# Patient Record
Sex: FEMALE | Race: WHITE | NOT HISPANIC OR LATINO | Employment: UNEMPLOYED | URBAN - METROPOLITAN AREA
[De-identification: names, ages, dates, MRNs, and addresses within clinical notes are randomized per-mention and may not be internally consistent; named-entity substitution may affect disease eponyms.]

---

## 2017-01-25 ENCOUNTER — HISTORICAL (OUTPATIENT)
Dept: RADIOLOGY | Facility: HOSPITAL | Age: 67
End: 2017-01-25

## 2017-05-09 ENCOUNTER — HISTORICAL (OUTPATIENT)
Dept: OCCUPATIONAL THERAPY | Facility: HOSPITAL | Age: 67
End: 2017-05-09

## 2017-05-10 ENCOUNTER — HISTORICAL (OUTPATIENT)
Dept: OCCUPATIONAL THERAPY | Facility: HOSPITAL | Age: 67
End: 2017-05-10

## 2017-05-12 ENCOUNTER — HISTORICAL (OUTPATIENT)
Dept: OCCUPATIONAL THERAPY | Facility: HOSPITAL | Age: 67
End: 2017-05-12

## 2017-05-18 ENCOUNTER — HISTORICAL (OUTPATIENT)
Dept: OCCUPATIONAL THERAPY | Facility: HOSPITAL | Age: 67
End: 2017-05-18

## 2017-05-22 ENCOUNTER — HISTORICAL (OUTPATIENT)
Dept: OCCUPATIONAL THERAPY | Facility: HOSPITAL | Age: 67
End: 2017-05-22

## 2017-05-24 ENCOUNTER — HISTORICAL (OUTPATIENT)
Dept: OCCUPATIONAL THERAPY | Facility: HOSPITAL | Age: 67
End: 2017-05-24

## 2017-05-31 ENCOUNTER — HISTORICAL (OUTPATIENT)
Dept: ADMINISTRATIVE | Facility: HOSPITAL | Age: 67
End: 2017-05-31

## 2017-06-06 ENCOUNTER — HISTORICAL (OUTPATIENT)
Dept: PREADMISSION TESTING | Facility: HOSPITAL | Age: 67
End: 2017-06-06

## 2017-06-06 LAB
BUN SERPL-MCNC: 17 MG/DL (ref 7–18)
CALCIUM SERPL-MCNC: 10.1 MG/DL (ref 8.5–10.1)
CHLORIDE SERPL-SCNC: 104 MMOL/L (ref 98–107)
CO2 SERPL-SCNC: 28 MMOL/L (ref 21–32)
CREAT SERPL-MCNC: 0.94 MG/DL (ref 0.55–1.02)
GLUCOSE SERPL-MCNC: 85 MG/DL (ref 74–106)
POTASSIUM SERPL-SCNC: 4.2 MMOL/L (ref 3.5–5.1)
SODIUM SERPL-SCNC: 140 MMOL/L (ref 136–145)

## 2017-06-08 ENCOUNTER — HISTORICAL (OUTPATIENT)
Dept: SURGERY | Facility: HOSPITAL | Age: 67
End: 2017-06-08

## 2017-07-05 ENCOUNTER — HISTORICAL (OUTPATIENT)
Dept: OCCUPATIONAL THERAPY | Facility: HOSPITAL | Age: 67
End: 2017-07-05

## 2017-07-07 ENCOUNTER — HISTORICAL (OUTPATIENT)
Dept: OCCUPATIONAL THERAPY | Facility: HOSPITAL | Age: 67
End: 2017-07-07

## 2017-07-10 ENCOUNTER — HISTORICAL (OUTPATIENT)
Dept: OCCUPATIONAL THERAPY | Facility: HOSPITAL | Age: 67
End: 2017-07-10

## 2017-07-12 ENCOUNTER — HISTORICAL (OUTPATIENT)
Dept: OCCUPATIONAL THERAPY | Facility: HOSPITAL | Age: 67
End: 2017-07-12

## 2017-07-14 ENCOUNTER — HISTORICAL (OUTPATIENT)
Dept: OCCUPATIONAL THERAPY | Facility: HOSPITAL | Age: 67
End: 2017-07-14

## 2017-07-17 ENCOUNTER — HISTORICAL (OUTPATIENT)
Dept: OCCUPATIONAL THERAPY | Facility: HOSPITAL | Age: 67
End: 2017-07-17

## 2017-07-19 ENCOUNTER — HISTORICAL (OUTPATIENT)
Dept: OCCUPATIONAL THERAPY | Facility: HOSPITAL | Age: 67
End: 2017-07-19

## 2017-07-21 ENCOUNTER — HISTORICAL (OUTPATIENT)
Dept: OCCUPATIONAL THERAPY | Facility: HOSPITAL | Age: 67
End: 2017-07-21

## 2017-07-24 ENCOUNTER — HISTORICAL (OUTPATIENT)
Dept: OCCUPATIONAL THERAPY | Facility: HOSPITAL | Age: 67
End: 2017-07-24

## 2017-07-26 ENCOUNTER — HISTORICAL (OUTPATIENT)
Dept: OCCUPATIONAL THERAPY | Facility: HOSPITAL | Age: 67
End: 2017-07-26

## 2017-07-28 ENCOUNTER — HISTORICAL (OUTPATIENT)
Dept: OCCUPATIONAL THERAPY | Facility: HOSPITAL | Age: 67
End: 2017-07-28

## 2017-07-31 ENCOUNTER — HISTORICAL (OUTPATIENT)
Dept: OCCUPATIONAL THERAPY | Facility: HOSPITAL | Age: 67
End: 2017-07-31

## 2017-08-02 ENCOUNTER — HISTORICAL (OUTPATIENT)
Dept: OCCUPATIONAL THERAPY | Facility: HOSPITAL | Age: 67
End: 2017-08-02

## 2017-08-04 ENCOUNTER — HISTORICAL (OUTPATIENT)
Dept: OCCUPATIONAL THERAPY | Facility: HOSPITAL | Age: 67
End: 2017-08-04

## 2017-08-07 ENCOUNTER — HISTORICAL (OUTPATIENT)
Dept: OCCUPATIONAL THERAPY | Facility: HOSPITAL | Age: 67
End: 2017-08-07

## 2017-08-09 ENCOUNTER — HISTORICAL (OUTPATIENT)
Dept: OCCUPATIONAL THERAPY | Facility: HOSPITAL | Age: 67
End: 2017-08-09

## 2017-08-11 ENCOUNTER — HISTORICAL (OUTPATIENT)
Dept: OCCUPATIONAL THERAPY | Facility: HOSPITAL | Age: 67
End: 2017-08-11

## 2017-08-14 ENCOUNTER — HISTORICAL (OUTPATIENT)
Dept: RADIOLOGY | Facility: HOSPITAL | Age: 67
End: 2017-08-14

## 2017-08-14 LAB
ABS NEUT (OLG): 4.19
ALBUMIN SERPL-MCNC: 4.3 GM/DL (ref 3.4–5)
ALBUMIN/GLOB SERPL: 1.2 RATIO (ref 1.1–2)
ALP SERPL-CCNC: 46 UNIT/L (ref 50–136)
ALT SERPL-CCNC: 15 UNIT/L (ref 12–78)
AST SERPL-CCNC: 10 UNIT/L (ref 10–37)
BASOPHILS # BLD AUTO: 0.02 X10(3)/MCL
BASOPHILS NFR BLD AUTO: 0.2 %
BILIRUB SERPL-MCNC: 0.4 MG/DL (ref 0.2–1)
BILIRUBIN DIRECT+TOT PNL SERPL-MCNC: 0.12 MG/DL (ref 0.05–0.2)
BILIRUBIN DIRECT+TOT PNL SERPL-MCNC: 0.28 MG/DL
BUN SERPL-MCNC: 16 MG/DL (ref 7–18)
CALCIUM SERPL-MCNC: 9.8 MG/DL (ref 8.5–10.1)
CHLORIDE SERPL-SCNC: 105 MMOL/L (ref 98–107)
CO2 SERPL-SCNC: 26.3 MMOL/L (ref 21–32)
CREAT SERPL-MCNC: 0.95 MG/DL (ref 0.55–1.02)
EOSINOPHIL # BLD AUTO: 0.08 X10(3)/MCL
EOSINOPHIL NFR BLD AUTO: 0.9 %
ERYTHROCYTE [DISTWIDTH] IN BLOOD BY AUTOMATED COUNT: 12 %
GLOBULIN SER-MCNC: 3.6 GM/DL (ref 2.4–3.5)
GLUCOSE SERPL-MCNC: 89 MG/DL (ref 74–106)
HCT VFR BLD AUTO: 41.3 % (ref 34–46)
HGB BLD-MCNC: 14.1 GM/DL (ref 11.3–15.4)
IMM GRANULOCYTES # BLD AUTO: 0.01 10*3/UL (ref 0–0.1)
IMM GRANULOCYTES NFR BLD AUTO: 0.1 % (ref 0–1)
LYMPHOCYTES # BLD AUTO: 3.22 X10(3)/MCL
LYMPHOCYTES NFR BLD AUTO: 37.5 %
MCH RBC QN AUTO: 30.6 PG (ref 27–33)
MCHC RBC AUTO-ENTMCNC: 34.1 GM/DL (ref 32–35)
MCV RBC AUTO: 89.6 FL (ref 81–97)
MONOCYTES # BLD AUTO: 1.06 X10(3)/MCL
MONOCYTES NFR BLD AUTO: 12.4 %
NEUTROPHILS # BLD AUTO: 4.19 X10(3)/MCL
NEUTROPHILS NFR BLD AUTO: 48.9 %
PLATELET # BLD AUTO: 286 X10(3)/MCL (ref 151–368)
PMV BLD AUTO: 11 FL
POTASSIUM SERPL-SCNC: 4 MMOL/L (ref 3.5–5.1)
PROT SERPL-MCNC: 7.9 GM/DL (ref 6.4–8.2)
RBC # BLD AUTO: 4.61 X10(6)/MCL (ref 3.9–5)
SODIUM SERPL-SCNC: 141 MMOL/L (ref 136–145)
WBC # SPEC AUTO: 8.58 X10(3)/MCL (ref 3.4–9.2)

## 2017-08-16 ENCOUNTER — HISTORICAL (OUTPATIENT)
Dept: OCCUPATIONAL THERAPY | Facility: HOSPITAL | Age: 67
End: 2017-08-16

## 2017-08-18 ENCOUNTER — HISTORICAL (OUTPATIENT)
Dept: OCCUPATIONAL THERAPY | Facility: HOSPITAL | Age: 67
End: 2017-08-18

## 2017-08-21 ENCOUNTER — HISTORICAL (OUTPATIENT)
Dept: OCCUPATIONAL THERAPY | Facility: HOSPITAL | Age: 67
End: 2017-08-21

## 2017-08-23 ENCOUNTER — HISTORICAL (OUTPATIENT)
Dept: OCCUPATIONAL THERAPY | Facility: HOSPITAL | Age: 67
End: 2017-08-23

## 2017-08-30 ENCOUNTER — HISTORICAL (OUTPATIENT)
Dept: OCCUPATIONAL THERAPY | Facility: HOSPITAL | Age: 67
End: 2017-08-30

## 2017-09-01 ENCOUNTER — HISTORICAL (OUTPATIENT)
Dept: OCCUPATIONAL THERAPY | Facility: HOSPITAL | Age: 67
End: 2017-09-01

## 2017-09-06 ENCOUNTER — HISTORICAL (OUTPATIENT)
Dept: OCCUPATIONAL THERAPY | Facility: HOSPITAL | Age: 67
End: 2017-09-06

## 2017-09-08 ENCOUNTER — HISTORICAL (OUTPATIENT)
Dept: OCCUPATIONAL THERAPY | Facility: HOSPITAL | Age: 67
End: 2017-09-08

## 2017-10-31 ENCOUNTER — HISTORICAL (OUTPATIENT)
Dept: LAB | Facility: HOSPITAL | Age: 67
End: 2017-10-31

## 2017-10-31 LAB
ALBUMIN SERPL-MCNC: 3.7 GM/DL (ref 3.4–5)
ALBUMIN/GLOB SERPL: 1.1 RATIO (ref 1.1–2)
ALP SERPL-CCNC: 44 UNIT/L (ref 50–136)
ALT SERPL-CCNC: 31 UNIT/L (ref 12–78)
AST SERPL-CCNC: 19 UNIT/L (ref 10–37)
BILIRUB SERPL-MCNC: 0.4 MG/DL (ref 0.2–1)
BILIRUBIN DIRECT+TOT PNL SERPL-MCNC: 0.11 MG/DL (ref 0.05–0.2)
BILIRUBIN DIRECT+TOT PNL SERPL-MCNC: 0.31 MG/DL
BUN SERPL-MCNC: 22 MG/DL (ref 7–18)
CALCIUM SERPL-MCNC: 9 MG/DL (ref 8.5–10.1)
CHLORIDE SERPL-SCNC: 107 MMOL/L (ref 98–107)
CHOLEST SERPL-MCNC: 155 MG/DL (ref 50–200)
CHOLEST/HDLC SERPL: 2 {RATIO} (ref 0–5)
CO2 SERPL-SCNC: 25.2 MMOL/L (ref 21–32)
CREAT SERPL-MCNC: 0.89 MG/DL (ref 0.55–1.02)
GLOBULIN SER-MCNC: 3.5 GM/DL (ref 2.4–3.5)
GLUCOSE SERPL-MCNC: 105 MG/DL (ref 74–106)
HDLC SERPL-MCNC: 67 MG/DL (ref 35–60)
LDLC SERPL CALC-MCNC: 75.6 MG/DL (ref 50–140)
POTASSIUM SERPL-SCNC: 4 MMOL/L (ref 3.5–5.1)
PROT SERPL-MCNC: 7.2 GM/DL (ref 6.4–8.2)
SODIUM SERPL-SCNC: 142 MMOL/L (ref 136–145)
TRIGL SERPL-MCNC: 62 MG/DL (ref 30–150)
TSH SERPL-ACNC: 2.77 MIU/ML (ref 0.35–3.75)
VLDLC SERPL CALC-MCNC: 12 MG/DL

## 2017-12-01 ENCOUNTER — HISTORICAL (OUTPATIENT)
Dept: RADIOLOGY | Facility: HOSPITAL | Age: 67
End: 2017-12-01

## 2017-12-01 LAB
ABS NEUT (OLG): 4.43
ALBUMIN SERPL-MCNC: 4.1 GM/DL (ref 3.4–5)
ALBUMIN/GLOB SERPL: 1 RATIO (ref 1.1–2)
ALP SERPL-CCNC: 47 UNIT/L (ref 46–116)
ALT SERPL-CCNC: 24 UNIT/L (ref 12–78)
AMYLASE SERPL-CCNC: 40 UNIT/L (ref 25–115)
AST SERPL-CCNC: 17 UNIT/L (ref 10–37)
BASOPHILS # BLD AUTO: 0.02 X10(3)/MCL
BASOPHILS NFR BLD AUTO: 0.2 %
BILIRUB SERPL-MCNC: 0.4 MG/DL (ref 0.2–1)
BILIRUBIN DIRECT+TOT PNL SERPL-MCNC: 0.14 MG/DL (ref 0–0.2)
BILIRUBIN DIRECT+TOT PNL SERPL-MCNC: 0.31 MG/DL
BUN SERPL-MCNC: 22 MG/DL (ref 7–18)
CALCIUM SERPL-MCNC: 9.5 MG/DL (ref 8.5–10.1)
CHLORIDE SERPL-SCNC: 103 MMOL/L (ref 98–107)
CO2 SERPL-SCNC: 24.8 MMOL/L (ref 21–32)
CREAT SERPL-MCNC: 0.98 MG/DL (ref 0.55–1.02)
EOSINOPHIL # BLD AUTO: 0.12 X10(3)/MCL
EOSINOPHIL NFR BLD AUTO: 1.5 %
ERYTHROCYTE [DISTWIDTH] IN BLOOD BY AUTOMATED COUNT: 13 %
GLOBULIN SER-MCNC: 4 GM/DL (ref 2.4–3.5)
GLUCOSE SERPL-MCNC: 97 MG/DL (ref 74–106)
HCT VFR BLD AUTO: 42.3 % (ref 34–46)
HGB BLD-MCNC: 14.2 GM/DL (ref 11.3–15.4)
IMM GRANULOCYTES # BLD AUTO: 0.02 10*3/UL (ref 0–0.1)
IMM GRANULOCYTES NFR BLD AUTO: 0.2 % (ref 0–1)
LYMPHOCYTES # BLD AUTO: 2.49 X10(3)/MCL
LYMPHOCYTES NFR BLD AUTO: 31 %
MCH RBC QN AUTO: 30.5 PG (ref 27–33)
MCHC RBC AUTO-ENTMCNC: 33.6 GM/DL (ref 32–35)
MCV RBC AUTO: 90.8 FL (ref 81–97)
MONOCYTES # BLD AUTO: 0.95 X10(3)/MCL
MONOCYTES NFR BLD AUTO: 11.8 %
NEUTROPHILS # BLD AUTO: 4.43 X10(3)/MCL
NEUTROPHILS NFR BLD AUTO: 55.3 %
PLATELET # BLD AUTO: 241 X10(3)/MCL (ref 151–368)
PMV BLD AUTO: 12 FL
POTASSIUM SERPL-SCNC: 4 MMOL/L (ref 3.5–5.1)
PROT SERPL-MCNC: 8.1 GM/DL (ref 6.4–8.2)
RBC # BLD AUTO: 4.66 X10(6)/MCL (ref 3.9–5)
SODIUM SERPL-SCNC: 140 MMOL/L (ref 136–145)
WBC # SPEC AUTO: 8.03 X10(3)/MCL (ref 3.4–9.2)

## 2018-02-07 ENCOUNTER — HISTORICAL (OUTPATIENT)
Dept: RADIOLOGY | Facility: HOSPITAL | Age: 68
End: 2018-02-07

## 2019-02-13 ENCOUNTER — HISTORICAL (OUTPATIENT)
Dept: RADIOLOGY | Facility: HOSPITAL | Age: 69
End: 2019-02-13

## 2019-03-08 ENCOUNTER — HISTORICAL (OUTPATIENT)
Dept: LAB | Facility: HOSPITAL | Age: 69
End: 2019-03-08

## 2019-03-08 LAB
ALBUMIN SERPL-MCNC: 3.9 GM/DL (ref 3.4–5)
ALBUMIN/GLOB SERPL: 1.1 RATIO (ref 1.1–2)
ALP SERPL-CCNC: 70 UNIT/L (ref 46–116)
ALT SERPL-CCNC: 33 UNIT/L (ref 12–78)
AST SERPL-CCNC: 17 UNIT/L (ref 10–37)
BILIRUB SERPL-MCNC: 0.6 MG/DL (ref 0.2–1)
BILIRUBIN DIRECT+TOT PNL SERPL-MCNC: 0.11 MG/DL (ref 0–0.2)
BILIRUBIN DIRECT+TOT PNL SERPL-MCNC: 0.49 MG/DL
BUN SERPL-MCNC: 23 MG/DL (ref 7–18)
CALCIUM SERPL-MCNC: 9.2 MG/DL (ref 8.5–10.1)
CHLORIDE SERPL-SCNC: 104 MMOL/L (ref 98–107)
CHOLEST SERPL-MCNC: 278 MG/DL (ref 50–200)
CHOLEST/HDLC SERPL: 6 {RATIO} (ref 0–5)
CO2 SERPL-SCNC: 26 MMOL/L (ref 21–32)
CREAT SERPL-MCNC: 0.83 MG/DL (ref 0.55–1.02)
CRP SERPL-MCNC: 0.3 MG/DL
ERYTHROCYTE [SEDIMENTATION RATE] IN BLOOD: 6 MM/HR (ref 0–20)
GLOBULIN SER-MCNC: 3.7 GM/DL (ref 2.4–3.5)
GLUCOSE SERPL-MCNC: 98 MG/DL (ref 74–106)
HDLC SERPL-MCNC: 47 MG/DL (ref 35–60)
LDLC SERPL CALC-MCNC: 196 MG/DL (ref 50–140)
POTASSIUM SERPL-SCNC: 4.2 MMOL/L (ref 3.5–5.1)
PROT SERPL-MCNC: 7.6 GM/DL (ref 6.4–8.2)
RHEUMATOID FACT SERPL-ACNC: <10 IU/ML (ref 0–15)
SODIUM SERPL-SCNC: 141 MMOL/L (ref 136–145)
TRIGL SERPL-MCNC: 173 MG/DL (ref 30–150)
TSH SERPL-ACNC: 2.23 MIU/ML (ref 0.35–3.75)
URATE SERPL-MCNC: 6.7 MG/DL (ref 2.6–7.2)
VLDLC SERPL CALC-MCNC: 35 MG/DL

## 2019-10-28 ENCOUNTER — HISTORICAL (OUTPATIENT)
Dept: LAB | Facility: HOSPITAL | Age: 69
End: 2019-10-28

## 2019-10-28 LAB
ABS NEUT (OLG): 6.37
ALBUMIN SERPL-MCNC: 3.9 GM/DL (ref 3.4–5)
ALBUMIN/GLOB SERPL: 1 RATIO (ref 1.1–2)
ALP SERPL-CCNC: 78 UNIT/L (ref 46–116)
ALT SERPL-CCNC: 25 UNIT/L (ref 12–78)
AST SERPL-CCNC: 19 UNIT/L (ref 10–37)
BASOPHILS # BLD AUTO: 0.03 X10(3)/MCL
BASOPHILS NFR BLD AUTO: 0.3 %
BILIRUB SERPL-MCNC: 0.5 MG/DL (ref 0.2–1)
BILIRUBIN DIRECT+TOT PNL SERPL-MCNC: 0.11 MG/DL (ref 0–0.2)
BILIRUBIN DIRECT+TOT PNL SERPL-MCNC: 0.39 MG/DL
BUN SERPL-MCNC: 20 MG/DL (ref 7–18)
CALCIUM SERPL-MCNC: 10.1 MG/DL (ref 8.5–10.1)
CHLORIDE SERPL-SCNC: 102 MMOL/L (ref 98–107)
CO2 SERPL-SCNC: 29 MMOL/L (ref 21–32)
CREAT SERPL-MCNC: 0.92 MG/DL (ref 0.55–1.02)
EOSINOPHIL # BLD AUTO: 0.15 X10(3)/MCL
EOSINOPHIL NFR BLD AUTO: 1.3 %
ERYTHROCYTE [DISTWIDTH] IN BLOOD BY AUTOMATED COUNT: 12 %
GLOBULIN SER-MCNC: 3.8 GM/DL (ref 2.4–3.5)
GLUCOSE SERPL-MCNC: 85 MG/DL (ref 74–106)
HCT VFR BLD AUTO: 44.2 % (ref 34–46)
HGB BLD-MCNC: 14.8 GM/DL (ref 11.3–15.4)
IMM GRANULOCYTES # BLD AUTO: 0.02 10*3/UL (ref 0–0.1)
IMM GRANULOCYTES NFR BLD AUTO: 0.2 % (ref 0–1)
LYMPHOCYTES # BLD AUTO: 3.59 X10(3)/MCL
LYMPHOCYTES NFR BLD AUTO: 31.8 %
MAGNESIUM SERPL-MCNC: 1.9 MG/DL (ref 1.8–2.4)
MCH RBC QN AUTO: 31.4 PG (ref 27–33)
MCHC RBC AUTO-ENTMCNC: 33.5 GM/DL (ref 32–35)
MCV RBC AUTO: 93.6 FL (ref 81–97)
MONOCYTES # BLD AUTO: 1.12 X10(3)/MCL
MONOCYTES NFR BLD AUTO: 9.9 %
NEUTROPHILS # BLD AUTO: 6.37 X10(3)/MCL
NEUTROPHILS NFR BLD AUTO: 56.5 %
PLATELET # BLD AUTO: 289 X10(3)/MCL (ref 140–450)
PMV BLD AUTO: 10 FL
POTASSIUM SERPL-SCNC: 4.4 MMOL/L (ref 3.5–5.1)
PROT SERPL-MCNC: 7.7 GM/DL (ref 6.4–8.2)
RBC # BLD AUTO: 4.72 X10(6)/MCL (ref 3.9–5)
SODIUM SERPL-SCNC: 139 MMOL/L (ref 136–145)
TSH SERPL-ACNC: 3.98 MIU/ML (ref 0.35–3.75)
WBC # SPEC AUTO: 11.28 X10(3)/MCL (ref 3.4–9.2)

## 2019-10-29 ENCOUNTER — HISTORICAL (OUTPATIENT)
Dept: RADIOLOGY | Facility: HOSPITAL | Age: 69
End: 2019-10-29

## 2019-10-29 LAB
ALBUMIN SERPL-MCNC: 3.9 GM/DL (ref 3.4–5)
ALP SERPL-CCNC: 77 UNIT/L (ref 46–116)
ALT SERPL-CCNC: 23 UNIT/L (ref 12–78)
AST SERPL-CCNC: 19 UNIT/L (ref 10–37)
BILIRUB SERPL-MCNC: 0.7 MG/DL (ref 0.2–1)
BILIRUBIN DIRECT+TOT PNL SERPL-MCNC: 0.17 MG/DL (ref 0–0.2)
BILIRUBIN DIRECT+TOT PNL SERPL-MCNC: 0.53 MG/DL
CHOLEST SERPL-MCNC: 195 MG/DL (ref 50–200)
CHOLEST/HDLC SERPL: 3 {RATIO} (ref 0–5)
HDLC SERPL-MCNC: 64 MG/DL (ref 35–60)
LDLC SERPL CALC-MCNC: 103 MG/DL (ref 50–140)
PROT SERPL-MCNC: 7.9 GM/DL (ref 6.4–8.2)
TRIGL SERPL-MCNC: 142 MG/DL (ref 30–150)
VLDLC SERPL CALC-MCNC: 28 MG/DL

## 2019-11-14 ENCOUNTER — HISTORICAL (OUTPATIENT)
Dept: OCCUPATIONAL THERAPY | Facility: HOSPITAL | Age: 69
End: 2019-11-14

## 2019-11-19 ENCOUNTER — HISTORICAL (OUTPATIENT)
Dept: OCCUPATIONAL THERAPY | Facility: HOSPITAL | Age: 69
End: 2019-11-19

## 2019-11-21 ENCOUNTER — HISTORICAL (OUTPATIENT)
Dept: OCCUPATIONAL THERAPY | Facility: HOSPITAL | Age: 69
End: 2019-11-21

## 2019-11-25 ENCOUNTER — HISTORICAL (OUTPATIENT)
Dept: OCCUPATIONAL THERAPY | Facility: HOSPITAL | Age: 69
End: 2019-11-25

## 2019-11-27 ENCOUNTER — HISTORICAL (OUTPATIENT)
Dept: OCCUPATIONAL THERAPY | Facility: HOSPITAL | Age: 69
End: 2019-11-27

## 2019-11-29 ENCOUNTER — HISTORICAL (OUTPATIENT)
Dept: OCCUPATIONAL THERAPY | Facility: HOSPITAL | Age: 69
End: 2019-11-29

## 2019-12-02 ENCOUNTER — HISTORICAL (OUTPATIENT)
Dept: OCCUPATIONAL THERAPY | Facility: HOSPITAL | Age: 69
End: 2019-12-02

## 2019-12-04 ENCOUNTER — HISTORICAL (OUTPATIENT)
Dept: OCCUPATIONAL THERAPY | Facility: HOSPITAL | Age: 69
End: 2019-12-04

## 2019-12-06 ENCOUNTER — HISTORICAL (OUTPATIENT)
Dept: OCCUPATIONAL THERAPY | Facility: HOSPITAL | Age: 69
End: 2019-12-06

## 2019-12-10 ENCOUNTER — HISTORICAL (OUTPATIENT)
Dept: OCCUPATIONAL THERAPY | Facility: HOSPITAL | Age: 69
End: 2019-12-10

## 2019-12-13 ENCOUNTER — HISTORICAL (OUTPATIENT)
Dept: OCCUPATIONAL THERAPY | Facility: HOSPITAL | Age: 69
End: 2019-12-13

## 2019-12-17 ENCOUNTER — HISTORICAL (OUTPATIENT)
Dept: OCCUPATIONAL THERAPY | Facility: HOSPITAL | Age: 69
End: 2019-12-17

## 2019-12-19 ENCOUNTER — HISTORICAL (OUTPATIENT)
Dept: OCCUPATIONAL THERAPY | Facility: HOSPITAL | Age: 69
End: 2019-12-19

## 2019-12-23 ENCOUNTER — HISTORICAL (OUTPATIENT)
Dept: OCCUPATIONAL THERAPY | Facility: HOSPITAL | Age: 69
End: 2019-12-23

## 2019-12-27 ENCOUNTER — HISTORICAL (OUTPATIENT)
Dept: OCCUPATIONAL THERAPY | Facility: HOSPITAL | Age: 69
End: 2019-12-27

## 2019-12-30 ENCOUNTER — HISTORICAL (OUTPATIENT)
Dept: LAB | Facility: HOSPITAL | Age: 69
End: 2019-12-30

## 2019-12-30 ENCOUNTER — HISTORICAL (OUTPATIENT)
Dept: OCCUPATIONAL THERAPY | Facility: HOSPITAL | Age: 69
End: 2019-12-30

## 2019-12-30 LAB — TSH SERPL-ACNC: 1.17 UIU/ML (ref 0.35–4.94)

## 2020-01-02 ENCOUNTER — HISTORICAL (OUTPATIENT)
Dept: OCCUPATIONAL THERAPY | Facility: HOSPITAL | Age: 70
End: 2020-01-02

## 2020-01-02 ENCOUNTER — HISTORICAL (OUTPATIENT)
Dept: RESPIRATORY THERAPY | Facility: HOSPITAL | Age: 70
End: 2020-01-02

## 2020-01-06 ENCOUNTER — HISTORICAL (OUTPATIENT)
Dept: OCCUPATIONAL THERAPY | Facility: HOSPITAL | Age: 70
End: 2020-01-06

## 2020-01-08 ENCOUNTER — HISTORICAL (OUTPATIENT)
Dept: OCCUPATIONAL THERAPY | Facility: HOSPITAL | Age: 70
End: 2020-01-08

## 2020-01-14 ENCOUNTER — HISTORICAL (OUTPATIENT)
Dept: OCCUPATIONAL THERAPY | Facility: HOSPITAL | Age: 70
End: 2020-01-14

## 2020-01-17 ENCOUNTER — HISTORICAL (OUTPATIENT)
Dept: OCCUPATIONAL THERAPY | Facility: HOSPITAL | Age: 70
End: 2020-01-17

## 2020-02-19 ENCOUNTER — HISTORICAL (OUTPATIENT)
Dept: RADIOLOGY | Facility: HOSPITAL | Age: 70
End: 2020-02-19

## 2020-02-26 ENCOUNTER — HISTORICAL (OUTPATIENT)
Dept: RADIOLOGY | Facility: HOSPITAL | Age: 70
End: 2020-02-26

## 2020-07-23 ENCOUNTER — HISTORICAL (OUTPATIENT)
Dept: RADIOLOGY | Facility: HOSPITAL | Age: 70
End: 2020-07-23

## 2020-08-11 ENCOUNTER — HISTORICAL (OUTPATIENT)
Dept: RADIOLOGY | Facility: HOSPITAL | Age: 70
End: 2020-08-11

## 2020-09-03 ENCOUNTER — HISTORICAL (OUTPATIENT)
Dept: RADIOLOGY | Facility: HOSPITAL | Age: 70
End: 2020-09-03

## 2020-10-14 ENCOUNTER — HISTORICAL (OUTPATIENT)
Dept: LAB | Facility: HOSPITAL | Age: 70
End: 2020-10-14

## 2020-12-11 ENCOUNTER — HISTORICAL (OUTPATIENT)
Dept: RADIOLOGY | Facility: HOSPITAL | Age: 70
End: 2020-12-11

## 2021-01-19 ENCOUNTER — HISTORICAL (OUTPATIENT)
Dept: LAB | Facility: HOSPITAL | Age: 71
End: 2021-01-19

## 2021-03-24 ENCOUNTER — HISTORICAL (OUTPATIENT)
Dept: RADIOLOGY | Facility: HOSPITAL | Age: 71
End: 2021-03-24

## 2021-04-28 ENCOUNTER — HISTORICAL (OUTPATIENT)
Dept: LAB | Facility: HOSPITAL | Age: 71
End: 2021-04-28

## 2021-04-28 LAB
ABS NEUT (OLG): 3.06
ALBUMIN SERPL-MCNC: 4.3 GM/DL (ref 3.4–4.8)
ALBUMIN/GLOB SERPL: 1.3 RATIO (ref 1.1–2)
ALP SERPL-CCNC: 98 UNIT/L (ref 40–150)
ALT SERPL-CCNC: 31 UNIT/L (ref 0–55)
AST SERPL-CCNC: 26 UNIT/L (ref 5–34)
BASOPHILS # BLD AUTO: 0.02 X10(3)/MCL
BASOPHILS NFR BLD AUTO: 0.3 %
BILIRUB SERPL-MCNC: 0.7 MG/DL
BILIRUBIN DIRECT+TOT PNL SERPL-MCNC: 0.3 MG/DL (ref 0–0.5)
BILIRUBIN DIRECT+TOT PNL SERPL-MCNC: 0.4 MG/DL
BUN SERPL-MCNC: 15 MG/DL (ref 9.8–20.1)
CALCIUM SERPL-MCNC: 9.6 MG/DL (ref 8.4–10.2)
CHLORIDE SERPL-SCNC: 104 MMOL/L (ref 98–107)
CO2 SERPL-SCNC: 27 MEQ/L (ref 23–31)
CREAT SERPL-MCNC: 0.99 MG/DL (ref 0.55–1.02)
EOSINOPHIL # BLD AUTO: 0.11 X10(3)/MCL
EOSINOPHIL NFR BLD AUTO: 1.8 %
ERYTHROCYTE [DISTWIDTH] IN BLOOD BY AUTOMATED COUNT: 13 %
GLOBULIN SER-MCNC: 3.3 GM/DL (ref 2.4–3.5)
GLUCOSE SERPL-MCNC: 199 MG/DL (ref 82–115)
HCT VFR BLD AUTO: 44.4 % (ref 34–46)
HGB BLD-MCNC: 14.5 GM/DL (ref 11.3–15.4)
IMM GRANULOCYTES # BLD AUTO: 0.01 10*3/UL (ref 0–0.1)
IMM GRANULOCYTES NFR BLD AUTO: 0.2 % (ref 0–1)
INR PPP: 1
LYMPHOCYTES # BLD AUTO: 2.6 X10(3)/MCL
LYMPHOCYTES NFR BLD AUTO: 42.2 %
MCH RBC QN AUTO: 30.1 PG (ref 27–33)
MCHC RBC AUTO-ENTMCNC: 32.7 GM/DL (ref 32–35)
MCV RBC AUTO: 92.3 FL (ref 81–97)
MONOCYTES # BLD AUTO: 0.36 X10(3)/MCL
MONOCYTES NFR BLD AUTO: 5.8 %
NEUTROPHILS # BLD AUTO: 3.06 X10(3)/MCL
NEUTROPHILS NFR BLD AUTO: 49.7 %
PLATELET # BLD AUTO: 249 X10(3)/MCL (ref 140–450)
PMV BLD AUTO: 10 FL
POTASSIUM SERPL-SCNC: 4 MMOL/L (ref 3.5–5.1)
PROT SERPL-MCNC: 7.6 GM/DL (ref 5.8–7.6)
PROTHROMBIN TIME: 9.9 SECOND(S) (ref 9.1–10.9)
RBC # BLD AUTO: 4.81 X10(6)/MCL (ref 3.9–5)
SODIUM SERPL-SCNC: 142 MMOL/L (ref 136–145)
WBC # SPEC AUTO: 6.16 X10(3)/MCL (ref 3.4–9.2)

## 2021-05-19 ENCOUNTER — HISTORICAL (OUTPATIENT)
Dept: LAB | Facility: HOSPITAL | Age: 71
End: 2021-05-19

## 2021-06-04 ENCOUNTER — HISTORICAL (OUTPATIENT)
Dept: RADIOLOGY | Facility: HOSPITAL | Age: 71
End: 2021-06-04

## 2021-06-17 ENCOUNTER — HISTORICAL (OUTPATIENT)
Dept: LAB | Facility: HOSPITAL | Age: 71
End: 2021-06-17

## 2021-06-17 LAB
CHOLEST SERPL-MCNC: 177 MG/DL
CHOLEST/HDLC SERPL: 4 {RATIO} (ref 0–5)
EST. AVERAGE GLUCOSE BLD GHB EST-MCNC: 120 MG/DL
HBA1C MFR BLD: 5.8 % (ref 4–6)
HDLC SERPL-MCNC: 43 MG/DL (ref 35–60)
LDLC SERPL CALC-MCNC: 94 MG/DL (ref 50–140)
TRIGL SERPL-MCNC: 202 MG/DL (ref 37–140)
TSH SERPL-ACNC: 1.12 UIU/ML (ref 0.35–4.94)
VLDLC SERPL CALC-MCNC: 40 MG/DL

## 2021-08-04 ENCOUNTER — HISTORICAL (OUTPATIENT)
Dept: RADIOLOGY | Facility: HOSPITAL | Age: 71
End: 2021-08-04

## 2021-09-02 ENCOUNTER — HISTORICAL (OUTPATIENT)
Dept: LAB | Facility: HOSPITAL | Age: 71
End: 2021-09-02

## 2022-03-28 ENCOUNTER — HISTORICAL (OUTPATIENT)
Dept: RADIOLOGY | Facility: HOSPITAL | Age: 72
End: 2022-03-28

## 2022-03-28 ENCOUNTER — HISTORICAL (OUTPATIENT)
Dept: ADMINISTRATIVE | Facility: HOSPITAL | Age: 72
End: 2022-03-28

## 2022-04-09 ENCOUNTER — HISTORICAL (OUTPATIENT)
Dept: ADMINISTRATIVE | Facility: HOSPITAL | Age: 72
End: 2022-04-09

## 2022-04-25 VITALS
BODY MASS INDEX: 29.04 KG/M2 | DIASTOLIC BLOOD PRESSURE: 70 MMHG | SYSTOLIC BLOOD PRESSURE: 140 MMHG | HEIGHT: 60 IN | WEIGHT: 147.94 LBS

## 2022-04-27 ENCOUNTER — HISTORICAL (OUTPATIENT)
Dept: LAB | Facility: HOSPITAL | Age: 72
End: 2022-04-27

## 2022-04-27 LAB
ALBUMIN SERPL-MCNC: 4.3 G/DL (ref 3.4–4.8)
ALBUMIN/GLOB SERPL: 1.2 {RATIO} (ref 1.1–2)
ALP SERPL-CCNC: 83 U/L (ref 40–150)
ALT SERPL-CCNC: 24 U/L (ref 0–55)
AST SERPL-CCNC: 21 U/L (ref 5–34)
BILIRUB SERPL-MCNC: 0.9 MG/DL
BILIRUBIN DIRECT+TOT PNL SERPL-MCNC: 0.3 (ref 0–0.5)
BILIRUBIN DIRECT+TOT PNL SERPL-MCNC: 0.6
BUN SERPL-MCNC: 21 MG/DL (ref 9.8–20.1)
CALCIUM SERPL-MCNC: 9.8 MG/DL (ref 8.7–10.5)
CHLORIDE SERPL-SCNC: 103 MMOL/L (ref 98–107)
CO2 SERPL-SCNC: 27 MMOL/L (ref 23–31)
CREAT SERPL-MCNC: 1.01 MG/DL (ref 0.55–1.02)
GLOBULIN SER-MCNC: 3.7 G/DL (ref 2.4–3.5)
GLUCOSE SERPL-MCNC: 108 MG/DL (ref 82–115)
HEMOLYSIS INTERF INDEX SERPL-ACNC: 6
ICTERIC INTERF INDEX SERPL-ACNC: 1
LIPEMIC INTERF INDEX SERPL-ACNC: 23
POTASSIUM SERPL-SCNC: 4.5 MMOL/L (ref 3.5–5.1)
PROT SERPL-MCNC: 8 G/DL (ref 5.8–7.6)
SODIUM SERPL-SCNC: 140 MMOL/L (ref 136–145)
TSH SERPL-ACNC: 2.24 M[IU]/L (ref 0.35–4.94)
URATE SERPL-MCNC: 6.9 MG/DL (ref 2.7–6)

## 2022-04-30 NOTE — OP NOTE
DATE OF SURGERY:    06/08/2017    SURGEON:  GINA Dickinson MD    ASSISTANT:  Jay Carrillo, Certified First Assistant    PREOPERATIVE DIAGNOSIS:  Right rotator cuff impingement tendinitis with AC joint arthritis.    POSTOPERATIVE DIAGNOSIS:  Right rotator cuff impingement tendinitis with AC joint arthritis plus calcific deposit right supraspinatus tendon with rotator cuff tear.    PROCEDURE:  Right subacromial decompression, modified Keisha procedure and excision of calcium deposit with rotator cuff repair.    INDICATION FOR OPERATION:  This 66-year-old had arthroscopy of her right shoulder done by another surgeon.  The patient had persistent pain.  MRI showed that the patient had persistent spurring at the AC joint causing impingement.  She also had a small calcific deposit on plain x-rays.  The risks and benefits of the proposed and alternative treatment were explained to the patient.  Questions were solicited and answered.  No assurance was given.  Informed consent was obtained.    PROCEDURE IN DETAIL:  After appropriate operative consents were obtained, the patient was taken to the Operating Room and placed on the operating table in supine position.  General laryngeal tracheal mask anesthesia was induced without difficulty.  Preoperatively, the patient had a scalene block for postoperative pain control.  The patient's head was placed in a Mcknight headrest.  The bed was placed in a modified Fowlers position.  The skin on the right arm and shoulder were prepped and draped in a sterile manner using ChloraPrep.  A standard anterior approach to the AC joint along Shun's lines was done.  A stay stitch was placed in the deltoid and a small amount of deltoid was taken down anterior to the AC joint.  The patient had the rotator cuff stuck to the undersurface of the deltoid and this was carefully dissected free, and I found a rotator cuff tear in supraspinatus as well as a large calcium deposit.  This  was all excised.  Bursectomy was done.  The undersurface of the acromion still showed some spurring, which was removed with a saw and the AC joint was exposed.  The patient had large AC joint spurs.  The undersurface of the distal acromion, as well as the distal clavicle were removed and smoothed with a Darrach retractor.  The clavipectoral fascia was left intact.       Next, attention was turned back to the rotator cuff.  I again confirmed that all of the calcium had been removed and the rotator cuff was able to be repaired with interrupted FiberWire sutures.  The shoulder was taken through range of motion and the repair was found to be stable in all planes.  The wound was thoroughly irrigated.  All bleeders were coagulated.  The deltoid was closed and reattached.  The wound was then closed in layered fashion with subcuticular stitch on the skin.  Sterile dressings were applied then compressive dressing was applied.  Lap count and sponge count were correct times two.  Estimated blood loss was minimal.  The patient tolerated the procedure without difficulty and was transferred to Recovery Room in stable condition.        ______________________________  MMD BRAXTON Baca/FLORIAN  DD:  06/08/2017  Time:  11:19AM  DT:  06/08/2017  Time:  01:18PM  Job #:  [id number]

## 2022-06-22 ENCOUNTER — LAB VISIT (OUTPATIENT)
Dept: LAB | Facility: HOSPITAL | Age: 72
End: 2022-06-22
Attending: FAMILY MEDICINE
Payer: MEDICARE

## 2022-06-22 DIAGNOSIS — E78.00 PURE HYPERCHOLESTEROLEMIA: ICD-10-CM

## 2022-06-22 DIAGNOSIS — M10.9 GOUT, UNSPECIFIED CAUSE, UNSPECIFIED CHRONICITY, UNSPECIFIED SITE: Primary | ICD-10-CM

## 2022-06-22 LAB
CHOLEST SERPL-MCNC: 207 MG/DL
CHOLEST/HDLC SERPL: 5 {RATIO} (ref 0–5)
HDLC SERPL-MCNC: 44 MG/DL (ref 35–60)
LDLC SERPL CALC-MCNC: 102 MG/DL (ref 50–140)
TRIGL SERPL-MCNC: 307 MG/DL (ref 37–140)
URATE SERPL-MCNC: 5 MG/DL (ref 2.6–6)
VLDLC SERPL CALC-MCNC: 61 MG/DL

## 2022-06-22 PROCEDURE — 80061 LIPID PANEL: CPT

## 2022-06-22 PROCEDURE — 36415 COLL VENOUS BLD VENIPUNCTURE: CPT

## 2022-06-22 PROCEDURE — 84550 ASSAY OF BLOOD/URIC ACID: CPT

## 2022-07-22 DIAGNOSIS — M25.532 LEFT WRIST PAIN: Primary | ICD-10-CM

## 2022-11-10 ENCOUNTER — LAB REQUISITION (OUTPATIENT)
Dept: LAB | Facility: HOSPITAL | Age: 72
End: 2022-11-10
Payer: MEDICARE

## 2022-11-10 DIAGNOSIS — L73.8 OTHER SPECIFIED FOLLICULAR DISORDERS: ICD-10-CM

## 2022-11-10 PROCEDURE — 87070 CULTURE OTHR SPECIMN AEROBIC: CPT | Performed by: DERMATOLOGY

## 2022-11-13 LAB
BACTERIA SPEC CULT: ABNORMAL
BACTERIA SPEC CULT: ABNORMAL

## 2022-12-05 ENCOUNTER — HOSPITAL ENCOUNTER (OUTPATIENT)
Dept: RADIOLOGY | Facility: HOSPITAL | Age: 72
Discharge: HOME | End: 2022-12-05
Attending: INTERNAL MEDICINE
Payer: MEDICARE

## 2022-12-05 DIAGNOSIS — I65.23 BILATERAL CAROTID ARTERY STENOSIS: ICD-10-CM

## 2022-12-05 PROCEDURE — 93880 EXTRACRANIAL BILAT STUDY: CPT | Mod: TC

## 2022-12-27 DIAGNOSIS — E03.9 HYPOTHYROIDISM, UNSPECIFIED TYPE: Primary | ICD-10-CM

## 2022-12-27 DIAGNOSIS — F41.9 ANXIETY: ICD-10-CM

## 2022-12-27 RX ORDER — DOXEPIN HYDROCHLORIDE 10 MG/1
10 CAPSULE ORAL DAILY
COMMUNITY
Start: 2022-10-26 | End: 2022-12-27 | Stop reason: SDUPTHER

## 2022-12-27 RX ORDER — DOXEPIN HYDROCHLORIDE 10 MG/1
10 CAPSULE ORAL DAILY
Qty: 30 CAPSULE | Refills: 2 | Status: SHIPPED | OUTPATIENT
Start: 2022-12-27 | End: 2023-03-24

## 2022-12-27 RX ORDER — LEVOTHYROXINE SODIUM 25 UG/1
25 TABLET ORAL DAILY
COMMUNITY
Start: 2022-09-08 | End: 2022-12-27 | Stop reason: SDUPTHER

## 2022-12-27 RX ORDER — LEVOTHYROXINE SODIUM 25 UG/1
25 TABLET ORAL DAILY
Qty: 30 TABLET | Refills: 2 | Status: SHIPPED | OUTPATIENT
Start: 2022-12-27 | End: 2023-03-24

## 2022-12-28 ENCOUNTER — HOSPITAL ENCOUNTER (EMERGENCY)
Facility: HOSPITAL | Age: 72
Discharge: HOME | End: 2022-12-28
Attending: STUDENT IN AN ORGANIZED HEALTH CARE EDUCATION/TRAINING PROGRAM
Payer: MEDICARE

## 2022-12-28 VITALS
HEART RATE: 78 BPM | DIASTOLIC BLOOD PRESSURE: 71 MMHG | WEIGHT: 160 LBS | OXYGEN SATURATION: 95 % | RESPIRATION RATE: 20 BRPM | HEIGHT: 60 IN | SYSTOLIC BLOOD PRESSURE: 136 MMHG | TEMPERATURE: 99 F | BODY MASS INDEX: 31.41 KG/M2

## 2022-12-28 DIAGNOSIS — R05.9 COUGH: ICD-10-CM

## 2022-12-28 DIAGNOSIS — J20.9 ACUTE BRONCHITIS, UNSPECIFIED ORGANISM: ICD-10-CM

## 2022-12-28 DIAGNOSIS — U07.1 COVID-19: Primary | ICD-10-CM

## 2022-12-28 LAB
ABS NEUT CALC (OHS): 4.7 X10(3)/MCL (ref 2.1–9.2)
ALBUMIN SERPL-MCNC: 4.1 G/DL (ref 3.4–4.8)
ALBUMIN/GLOB SERPL: 1.3 RATIO (ref 1.1–2)
ALP SERPL-CCNC: 87 UNIT/L (ref 40–150)
ALT SERPL-CCNC: 34 UNIT/L (ref 0–55)
APPEARANCE UR: CLEAR
AST SERPL-CCNC: 31 UNIT/L (ref 5–34)
BACTERIA #/AREA URNS AUTO: ABNORMAL /HPF
BILIRUB UR QL STRIP.AUTO: NEGATIVE MG/DL
BILIRUBIN DIRECT+TOT PNL SERPL-MCNC: 0.8 MG/DL
BUN SERPL-MCNC: 13 MG/DL (ref 9.8–20.1)
CALCIUM SERPL-MCNC: 9.4 MG/DL (ref 8.4–10.2)
CHLORIDE SERPL-SCNC: 103 MMOL/L (ref 98–107)
CO2 SERPL-SCNC: 26 MMOL/L (ref 23–31)
COLOR UR AUTO: YELLOW
CREAT SERPL-MCNC: 0.91 MG/DL (ref 0.55–1.02)
EOSINOPHIL NFR BLD MANUAL: 0.15 X10(3)/MCL (ref 0–0.9)
EOSINOPHIL NFR BLD MANUAL: 2 % (ref 0–8)
ERYTHROCYTE [DISTWIDTH] IN BLOOD BY AUTOMATED COUNT: 13.2 % (ref 11–14.5)
FLUAV AG UPPER RESP QL IA.RAPID: NOT DETECTED
FLUBV AG UPPER RESP QL IA.RAPID: NOT DETECTED
GFR SERPLBLD CREATININE-BSD FMLA CKD-EPI: >60 MLS/MIN/1.73/M2
GLOBULIN SER-MCNC: 3.2 GM/DL (ref 2.4–3.5)
GLUCOSE SERPL-MCNC: 102 MG/DL (ref 82–115)
GLUCOSE UR QL STRIP.AUTO: NEGATIVE MG/DL
HCT VFR BLD AUTO: 38.5 % (ref 37–47)
HGB BLD-MCNC: 13.1 GM/DL (ref 12–16)
IMM GRANULOCYTES # BLD AUTO: 0.02 X10(3)/MCL (ref 0–0.04)
IMM GRANULOCYTES NFR BLD AUTO: 0.3 %
KETONES UR QL STRIP.AUTO: NEGATIVE MG/DL
LEUKOCYTE ESTERASE UR QL STRIP.AUTO: NEGATIVE UNIT/L
LYMPHOCYTES NFR BLD MANUAL: 1.69 X10(3)/MCL
LYMPHOCYTES NFR BLD MANUAL: 22 % (ref 13–40)
MCH RBC QN AUTO: 31.3 PG
MCHC RBC AUTO-ENTMCNC: 34 MG/DL (ref 33–36)
MCV RBC AUTO: 92.1 FL (ref 80–94)
MONOCYTES NFR BLD MANUAL: 1.16 X10(3)/MCL (ref 0.1–1.3)
MONOCYTES NFR BLD MANUAL: 15 % (ref 2–11)
NEUTROPHILS NFR BLD MANUAL: 61 % (ref 47–80)
NITRITE UR QL STRIP.AUTO: NEGATIVE
NRBC BLD AUTO-RTO: 0 % (ref 0–1)
PH UR STRIP.AUTO: 6 [PH]
PLATELET # BLD AUTO: 194 X10(3)/MCL (ref 140–371)
PLATELET # BLD EST: ADEQUATE 10*3/UL
PMV BLD AUTO: 10.7 FL (ref 9.4–12.4)
POTASSIUM SERPL-SCNC: 3.7 MMOL/L (ref 3.5–5.1)
PROT SERPL-MCNC: 7.3 GM/DL (ref 5.8–7.6)
PROT UR QL STRIP.AUTO: NEGATIVE MG/DL
RBC # BLD AUTO: 4.18 X10(6)/MCL (ref 4.2–5.4)
RBC #/AREA URNS AUTO: ABNORMAL /HPF
RBC UR QL AUTO: ABNORMAL UNIT/L
SARS-COV-2 RNA RESP QL NAA+PROBE: DETECTED
SODIUM SERPL-SCNC: 139 MMOL/L (ref 136–145)
SP GR UR STRIP.AUTO: <=1.005
SQUAMOUS #/AREA URNS AUTO: ABNORMAL /HPF
STREP A PCR (OHS): NOT DETECTED
UROBILINOGEN UR STRIP-ACNC: 0.2 MG/DL
WBC # SPEC AUTO: 7.7 X10(3)/MCL (ref 4.5–11.5)
WBC #/AREA URNS AUTO: ABNORMAL /HPF

## 2022-12-28 PROCEDURE — 85027 COMPLETE CBC AUTOMATED: CPT | Performed by: STUDENT IN AN ORGANIZED HEALTH CARE EDUCATION/TRAINING PROGRAM

## 2022-12-28 PROCEDURE — 63600175 PHARM REV CODE 636 W HCPCS: Performed by: STUDENT IN AN ORGANIZED HEALTH CARE EDUCATION/TRAINING PROGRAM

## 2022-12-28 PROCEDURE — 81001 URINALYSIS AUTO W/SCOPE: CPT | Performed by: STUDENT IN AN ORGANIZED HEALTH CARE EDUCATION/TRAINING PROGRAM

## 2022-12-28 PROCEDURE — 0240U COVID/FLU A&B PCR: CPT | Performed by: STUDENT IN AN ORGANIZED HEALTH CARE EDUCATION/TRAINING PROGRAM

## 2022-12-28 PROCEDURE — 99284 EMERGENCY DEPT VISIT MOD MDM: CPT | Mod: 25

## 2022-12-28 PROCEDURE — 87651 STREP A DNA AMP PROBE: CPT | Performed by: STUDENT IN AN ORGANIZED HEALTH CARE EDUCATION/TRAINING PROGRAM

## 2022-12-28 PROCEDURE — 80053 COMPREHEN METABOLIC PANEL: CPT | Performed by: STUDENT IN AN ORGANIZED HEALTH CARE EDUCATION/TRAINING PROGRAM

## 2022-12-28 RX ORDER — PREDNISONE 20 MG/1
40 TABLET ORAL DAILY
Qty: 10 TABLET | Refills: 0 | Status: SHIPPED | OUTPATIENT
Start: 2022-12-28 | End: 2023-01-02

## 2022-12-28 RX ORDER — BENZONATATE 200 MG/1
200 CAPSULE ORAL 3 TIMES DAILY PRN
Qty: 20 CAPSULE | Refills: 0 | Status: SHIPPED | OUTPATIENT
Start: 2022-12-28 | End: 2023-01-07

## 2022-12-28 RX ORDER — ALBUTEROL SULFATE 90 UG/1
2 AEROSOL, METERED RESPIRATORY (INHALATION) EVERY 6 HOURS PRN
Qty: 8 G | Refills: 0 | Status: SHIPPED | OUTPATIENT
Start: 2022-12-28 | End: 2023-07-03 | Stop reason: SDUPTHER

## 2022-12-28 RX ADMIN — SODIUM CHLORIDE, POTASSIUM CHLORIDE, SODIUM LACTATE AND CALCIUM CHLORIDE 1000 ML: 600; 310; 30; 20 INJECTION, SOLUTION INTRAVENOUS at 12:12

## 2022-12-28 NOTE — ED NOTES
Patient ambulated to ER room 2 with steady gait.  Stated that she has been coughing for the past three days.  When patient coughs she states that she episodes of incontinence.

## 2022-12-28 NOTE — ED PROVIDER NOTES
Encounter Date: 12/28/2022       History     Chief Complaint   Patient presents with    Cough    Sore Throat     Cough and sore throat x 3 days.  Reports urinary incontinence when coughing.       HPI    72-year-old female with a past medical history as delineated below presents emergency department for cough, sore throat.  Patient states that it has been ongoing for last 3 days.  States that last night the coughing worsened.  Denies any fever or shortness of breath.  States she finds that she is wheezing.  No history of COPD, tobacco abuse or asthma.  No known sick contacts.  Patient states she is coughing to the point it is causing have urinary incontinence.  History of incontinence in the past.  No dysuria or increased urination.    Review of patient's allergies indicates:   Allergen Reactions    Adhesive tape-silicones      Other reaction(s): blisters skin     Past Medical History:   Diagnosis Date    Bursitis     Impingement syndrome of right shoulder     Other hyperlipidemia     Primary osteoarthritis of right shoulder      History reviewed. No pertinent surgical history.  History reviewed. No pertinent family history.  Social History     Tobacco Use    Smoking status: Never    Smokeless tobacco: Never   Substance Use Topics    Alcohol use: Never    Drug use: Never     Review of Systems   Constitutional:  Negative for fatigue and fever.   HENT:  Positive for congestion and sore throat.    Respiratory:  Positive for cough. Negative for shortness of breath and wheezing.    Cardiovascular:  Negative for chest pain.   Gastrointestinal:  Negative for abdominal pain, constipation, diarrhea, nausea and vomiting.   All other systems reviewed and are negative.    Physical Exam     Initial Vitals [12/28/22 1116]   BP Pulse Resp Temp SpO2   137/87 78 18 99 °F (37.2 °C) 95 %      MAP       --         Physical Exam    Nursing note and vitals reviewed.  Constitutional: She appears well-developed and well-nourished. No  distress.   HENT:   Right Ear: External ear normal.   Left Ear: External ear normal.   Mouth/Throat: Oropharynx is clear and moist.   Mild nasal congestion   Cardiovascular:  Normal rate and regular rhythm.           Pulmonary/Chest: No respiratory distress. She has wheezes (Mild expiratory). She has no rhonchi. She has no rales.   Abdominal: Abdomen is soft. Bowel sounds are normal. There is no abdominal tenderness.   Musculoskeletal:         General: No tenderness. Normal range of motion.     Neurological: She is alert and oriented to person, place, and time.   Skin: Skin is warm. Capillary refill takes less than 2 seconds.   Psychiatric: She has a normal mood and affect. Thought content normal.       ED Course   Procedures  Labs Reviewed   COVID/FLU A&B PCR - Abnormal; Notable for the following components:       Result Value    SARS-CoV-2 PCR Detected (*)     All other components within normal limits    Narrative:     The Xpert Xpress SARS-CoV-2/FLU/RSV plus is a rapid, multiplexed real-time PCR test intended for the simultaneous qualitative detection and differentiation of SARS-CoV-2, Influenza A, Influenza B, and respiratory syncytial virus (RSV) viral RNA in either nasopharyngeal swab or nasal swab specimens.         URINALYSIS, REFLEX TO URINE CULTURE - Abnormal; Notable for the following components:    Blood, UA Trace (*)     All other components within normal limits   CBC WITH DIFFERENTIAL - Abnormal; Notable for the following components:    RBC 4.18 (*)     All other components within normal limits   URINALYSIS, MICROSCOPIC - Abnormal; Notable for the following components:    Squamous Epithelial Cells, UA Few (*)     All other components within normal limits   MANUAL DIFFERENTIAL - Abnormal; Notable for the following components:    Monocyte Man 15 (*)     All other components within normal limits   STREP GROUP A BY PCR - Normal    Narrative:     The Xpert Xpress Strep A test is a rapid, qualitative in  vitro diagnostic test for the detection of Streptococcus pyogenes (Group A ß-hemolytic Streptococcus, Strep A) in throat swab specimens from patients with signs and symptoms of pharyngitis.     COMPREHENSIVE METABOLIC PANEL   CBC W/ AUTO DIFFERENTIAL    Narrative:     The following orders were created for panel order CBC auto differential.  Procedure                               Abnormality         Status                     ---------                               -----------         ------                     CBC with Differential[[id number]]        Abnormal            Final result               Manual Differential[[id number]]          Abnormal            Final result                 Please view results for these tests on the individual orders.          Imaging Results              X-Ray Chest PA And Lateral (Final result)  Result time 12/28/22 11:48:52      Final result by Guillermo Blanton MD (12/28/22 11:48:52)                   Impression:      No acute cardiopulmonary process identified.      Electronically signed by: Guillermo Blanton  Date:    12/28/2022  Time:    11:48               Narrative:    EXAMINATION:  XR CHEST PA AND LATERAL    CLINICAL HISTORY:  Cough, unspecified    TECHNIQUE:  Two-view    COMPARISON:  July 23, 2020.    FINDINGS:  Cardiopericardial silhouette is within normal limits. Lungs are without dense focal or segmental consolidation, congestion, pleural effusion or pneumothorax.                                       Medications   lactated ringers bolus 1,000 mL (1,000 mLs Intravenous New Bag 12/28/22 1207)     Medical Decision Making:   Differential Diagnosis:   Viral syndrome, COVID, flu, bronchitis           ED Course as of 12/28/22 1220   Wed Dec 28, 2022   1218 SARS-CoV2 (COVID-19) Qualitative PCR(!): Detected [BS]   1218 NITRITE UA: Negative [BS]   1218 Leukocytes, UA: Negative [BS]   1218 Creatinine: 0.91 [BS]   1218 BUN: 13.0 [BS]   1218 WBC: 7.7 [BS]      ED Course User Index  [BS] Luke  VINCE Cardenas MD                 Clinical Impression:   Final diagnoses:  [R05.9] Cough  [U07.1] COVID-19 (Primary)  [J20.9] Acute bronchitis, unspecified organism        ED Disposition Condition    Discharge Stable          ED Prescriptions       Medication Sig Dispense Start Date End Date Auth. Provider    albuterol (VENTOLIN HFA) 90 mcg/actuation inhaler Inhale 2 puffs into the lungs every 6 (six) hours as needed for Wheezing. Rescue 8 g 12/28/2022 -- Luke Cardenas MD    predniSONE (DELTASONE) 20 MG tablet Take 2 tablets (40 mg total) by mouth once daily. for 5 days 10 tablet 12/28/2022 1/2/2023 Luke Cardenas MD    benzonatate (TESSALON) 200 MG capsule Take 1 capsule (200 mg total) by mouth 3 (three) times daily as needed for Cough. 20 capsule 12/28/2022 1/7/2023 Luke Cardenas MD          Follow-up Information       Follow up With Specialties Details Why Contact Info    [facility] - Emergency Dept Emergency Medicine Go to  If symptoms worsen [address] [facility] [address] [phone]             Luke Cardenas MD  12/28/22 2145

## 2023-03-16 ENCOUNTER — OFFICE VISIT (OUTPATIENT)
Dept: FAMILY MEDICINE | Facility: CLINIC | Age: 73
End: 2023-03-16
Payer: MEDICARE

## 2023-03-16 VITALS
DIASTOLIC BLOOD PRESSURE: 72 MMHG | WEIGHT: 170 LBS | RESPIRATION RATE: 16 BRPM | SYSTOLIC BLOOD PRESSURE: 125 MMHG | HEART RATE: 62 BPM | OXYGEN SATURATION: 96 % | TEMPERATURE: 97 F | HEIGHT: 61 IN | BODY MASS INDEX: 32.1 KG/M2

## 2023-03-16 DIAGNOSIS — I10 PRIMARY HYPERTENSION: ICD-10-CM

## 2023-03-16 DIAGNOSIS — M1A.0790 IDIOPATHIC CHRONIC GOUT OF FOOT WITHOUT TOPHUS, UNSPECIFIED LATERALITY: Chronic | ICD-10-CM

## 2023-03-16 DIAGNOSIS — F41.1 GENERALIZED ANXIETY DISORDER: Primary | ICD-10-CM

## 2023-03-16 DIAGNOSIS — E03.9 HYPOTHYROIDISM, UNSPECIFIED TYPE: ICD-10-CM

## 2023-03-16 PROBLEM — E78.5 HYPERLIPIDEMIA: Status: ACTIVE | Noted: 2023-03-16

## 2023-03-16 PROBLEM — K21.9 GASTROESOPHAGEAL REFLUX DISEASE WITHOUT ESOPHAGITIS: Status: ACTIVE | Noted: 2020-12-07

## 2023-03-16 PROBLEM — G47.33 OSA (OBSTRUCTIVE SLEEP APNEA): Chronic | Status: ACTIVE | Noted: 2023-03-16

## 2023-03-16 PROBLEM — M19.019 OSTEOARTHRITIS OF SHOULDER: Status: ACTIVE | Noted: 2023-03-16

## 2023-03-16 PROBLEM — M10.9 GOUT: Chronic | Status: ACTIVE | Noted: 2023-03-16

## 2023-03-16 PROBLEM — J45.20 MILD INTERMITTENT ASTHMA WITHOUT COMPLICATION: Chronic | Status: ACTIVE | Noted: 2023-03-16

## 2023-03-16 PROCEDURE — 99204 PR OFFICE/OUTPT VISIT, NEW, LEVL IV, 45-59 MIN: ICD-10-PCS | Mod: ,,, | Performed by: FAMILY MEDICINE

## 2023-03-16 PROCEDURE — 99204 OFFICE O/P NEW MOD 45 MIN: CPT | Mod: ,,, | Performed by: FAMILY MEDICINE

## 2023-03-16 RX ORDER — FUROSEMIDE 20 MG/1
20 TABLET ORAL DAILY
COMMUNITY
End: 2023-12-19

## 2023-03-16 RX ORDER — DULOXETIN HYDROCHLORIDE 60 MG/1
60 CAPSULE, DELAYED RELEASE ORAL DAILY
COMMUNITY
Start: 2023-01-11

## 2023-03-16 RX ORDER — BISOPROLOL FUMARATE 5 MG/1
5 TABLET, FILM COATED ORAL DAILY
COMMUNITY
Start: 2023-03-04

## 2023-03-16 RX ORDER — ALLOPURINOL 100 MG/1
100 TABLET ORAL DAILY
COMMUNITY
Start: 2022-12-18 | End: 2023-03-16 | Stop reason: SDUPTHER

## 2023-03-16 RX ORDER — DULOXETIN HYDROCHLORIDE 30 MG/1
30 CAPSULE, DELAYED RELEASE ORAL DAILY
COMMUNITY
Start: 2023-03-02

## 2023-03-16 RX ORDER — AMLODIPINE BESYLATE 2.5 MG/1
2.5 TABLET ORAL DAILY
COMMUNITY
Start: 2023-01-13

## 2023-03-16 RX ORDER — ACETAMINOPHEN 500 MG
2000 TABLET ORAL DAILY
COMMUNITY

## 2023-03-16 RX ORDER — CALCIUM CARBONATE 600 MG
600 TABLET ORAL DAILY
COMMUNITY

## 2023-03-16 RX ORDER — ALLOPURINOL 100 MG/1
100 TABLET ORAL 3 TIMES DAILY
Qty: 90 TABLET | Refills: 5 | Status: SHIPPED | OUTPATIENT
Start: 2023-03-16 | End: 2023-11-06

## 2023-03-16 RX ORDER — ATORVASTATIN CALCIUM 20 MG/1
20 TABLET, FILM COATED ORAL DAILY
COMMUNITY
Start: 2022-12-01 | End: 2023-09-21

## 2023-03-16 RX ORDER — KETOCONAZOLE 20 MG/G
CREAM TOPICAL
COMMUNITY
Start: 2022-11-10 | End: 2023-03-16

## 2023-03-16 NOTE — PROGRESS NOTES
Patient ID: [id number]     Chief Complaint: Establish Care        HPI:     Kayla Wall is a 72 y.o. female here today for Establish Care No other complaints today. Had a basal cell carcinoma removed on her right side of nose, having some problems with the graft healing properly.         ----------------------------  Anxiety  Asthma  Basal cell carcinoma (BCC)  Bursitis  Carpal tunnel syndrome, bilateral upper limbs  Elevated diaphragm  Fatty liver  GERD (gastroesophageal reflux disease)  Gout  H. pylori infection  HLD (hyperlipidemia)  HTN (hypertension)  Hypothyroidism  Impingement syndrome of right shoulder  ASHLEY (obstructive sleep apnea)  Osteoarthritis  Primary osteoarthritis of right shoulder  SOB (shortness of breath)     Past Surgical History:   Procedure Laterality Date    APPENDECTOMY  1964    BASAL CELL CARCINOMA EXCISION Left     nose - Dr Kenney Benitez    BASAL CELL CARCINOMA EXCISION Right 2023    nose - Dr Kenney Benitez    CARDIAC CATHETERIZATION  2021    Dr Yobani Serrano    CARPAL TUNNEL RELEASE Left 2022    Dr Epifanio Ivy    CARPAL TUNNEL RELEASE Right 2017    Dr Petey Saenz     SECTION  1976     SECTION  1971     SECTION  1968    CHOLECYSTECTOMY  2016    Dr Darnell Reich    DISSECTION OF NECK  1989    HYSTERECTOMY  1978    MYELOGRAPHY  10/22/1999    PLANTAR FASCIA RELEASE Left 2019    ROTATOR CUFF REPAIR Right 2017    Dr Kassy Hernadez    SHOULDER ARTHROSCOPY W/ SUBACROMIAL DECOMPRESSION AND DISTAL CLAVICLE EXCISION Right 2017    Dr Kassy Hernadez    TONSILLECTOMY  8       Review of patient's allergies indicates:   Allergen Reactions    Adhesive tape-silicones      Other reaction(s): blisters skin    Adhesive Rash     Other reaction(s): blisters skin       Outpatient Medications Marked as Taking for the 3/16/23 encounter (Office Visit) with Anthony Leslie DO   Medication Sig Dispense Refill    albuterol  (VENTOLIN HFA) 90 mcg/actuation inhaler Inhale 2 puffs into the lungs every 6 (six) hours as needed for Wheezing. Rescue 8 g 0    amLODIPine (NORVASC) 2.5 MG tablet Take 2.5 mg by mouth once daily.      atorvastatin (LIPITOR) 20 MG tablet Take 20 mg by mouth once daily.      bisoprolol (ZEBETA) 5 MG tablet Take 5 mg by mouth once daily.      calcium carbonate (OS-YAHAIRA) 600 mg calcium (1,500 mg) Tab Take 600 mg by mouth once daily.      cholecalciferol, vitamin D3, (VITAMIN D3) 50 mcg (2,000 unit) Cap capsule Take 2,000 Units by mouth once daily.      doxepin (SINEQUAN) 10 MG capsule Take 1 capsule (10 mg total) by mouth once daily. 30 capsule 2    DULoxetine (CYMBALTA) 30 MG capsule Take 30 mg by mouth once daily.      DULoxetine (CYMBALTA) 60 MG capsule Take 60 mg by mouth once daily.      furosemide (LASIX) 20 MG tablet Take 20 mg by mouth once daily.      levothyroxine (SYNTHROID) 25 MCG tablet Take 1 tablet (25 mcg total) by mouth once daily. 30 tablet 2    LORazepam (ATIVAN) 1 MG tablet TAKE 1 TABLET AT BEDTIME 30 tablet 5    [DISCONTINUED] allopurinoL (ZYLOPRIM) 100 MG tablet Take 100 mg by mouth once daily.         Social History     Socioeconomic History    Marital status:    Tobacco Use    Smoking status: Never    Smokeless tobacco: Never   Substance and Sexual Activity    Alcohol use: Not Currently    Drug use: Never    Sexual activity: Not Currently        Family History   Problem Relation Age of Onset    Heart disease Mother     Heart failure Mother     Hypertension Mother     Lung cancer Father 49        Metastatic    No Known Problems Sister     Diabetes Mellitus Maternal Grandmother     Asthma Daughter     Thyroid disease Daughter     Hypertension Son         Patient Care Team:  Anthony Leslie DO as PCP - General (Family Medicine)  Yobani Serrano MD as Consulting Physician (Cardiology)  Nedra Rushing MD (Dermatology)  TONI Mccord (Endocrinology)  TONI Pacheco  "(Endocrinology)  Hola Barrios MD as Consulting Physician (Orthopedic Surgery)  Low Jimenez MD as Consulting Physician (Gastroenterology)  Kenney Benitez MD as Consulting Physician (Dermatology)     Subjective:     Review of Systems   Constitutional:  Negative for fever.   Respiratory:  Positive for shortness of breath. Negative for wheezing.    Cardiovascular:  Negative for chest pain.   Gastrointestinal:  Negative for constipation and diarrhea.   Neurological:  Negative for dizziness and headaches.     See HPI for details  All Other ROS: Negative except as stated in HPI.       Objective:     /72   Pulse 62   Temp 97.3 °F (36.3 °C) (Tympanic)   Resp 16   Ht 5' 0.63" (1.54 m)   Wt 77.1 kg (170 lb)   SpO2 96%   BMI 32.51 kg/m²     Physical Exam  Vitals reviewed.   Constitutional:       General: She is not in acute distress.     Appearance: Normal appearance.   Cardiovascular:      Rate and Rhythm: Normal rate and regular rhythm.      Heart sounds: No murmur heard.    No friction rub. No gallop.   Pulmonary:      Effort: No respiratory distress.      Breath sounds: No wheezing, rhonchi or rales.   Musculoskeletal:         General: No swelling, tenderness or deformity.      Right lower leg: No edema.      Left lower leg: No edema.   Skin:     General: Skin is warm and dry.      Findings: No lesion or rash.   Neurological:      General: No focal deficit present.      Mental Status: She is alert.   Psychiatric:         Mood and Affect: Mood normal.       Assessment/Plan:     1. Generalized anxiety disorder  well controlled on current prescription medication    2. Primary hypertension  well controlled on current prescription medication    3. Hypothyroidism, unspecified type  well controlled on current prescription medication    4. Idiopathic chronic gout of foot without tophus, unspecified laterality  -     allopurinoL (ZYLOPRIM) 100 MG tablet; Take 1 tablet (100 mg total) by mouth 3 (three) times " daily.  Dispense: 90 tablet; Refill: 5          Follow up:     Follow up in about 6 months (around 9/16/2023) for Medicare Wellness. In addition to their scheduled follow up, the patient has also been instructed to follow up on as needed basis.

## 2023-04-04 ENCOUNTER — HOSPITAL ENCOUNTER (OUTPATIENT)
Dept: RADIOLOGY | Facility: HOSPITAL | Age: 73
Discharge: HOME | End: 2023-04-04
Attending: OBSTETRICS & GYNECOLOGY
Payer: MEDICARE

## 2023-04-04 DIAGNOSIS — Z12.31 ENCOUNTER FOR SCREENING MAMMOGRAM FOR BREAST CANCER: ICD-10-CM

## 2023-04-04 PROCEDURE — 77067 MAMMO DIGITAL SCREENING BILAT WITH TOMO: ICD-10-PCS | Mod: 26,,, | Performed by: RADIOLOGY

## 2023-04-04 PROCEDURE — 77067 SCR MAMMO BI INCL CAD: CPT | Mod: TC

## 2023-04-04 PROCEDURE — 77063 MAMMO DIGITAL SCREENING BILAT WITH TOMO: ICD-10-PCS | Mod: 26,,, | Performed by: RADIOLOGY

## 2023-04-04 PROCEDURE — 77063 BREAST TOMOSYNTHESIS BI: CPT | Mod: 26,,, | Performed by: RADIOLOGY

## 2023-04-04 PROCEDURE — 77067 SCR MAMMO BI INCL CAD: CPT | Mod: 26,,, | Performed by: RADIOLOGY

## 2023-04-28 ENCOUNTER — HOSPITAL ENCOUNTER (OUTPATIENT)
Dept: RADIOLOGY | Facility: HOSPITAL | Age: 73
Discharge: HOME | End: 2023-04-28
Attending: INTERNAL MEDICINE
Payer: MEDICARE

## 2023-04-28 DIAGNOSIS — M79.661 PAIN IN BOTH LOWER LEGS: ICD-10-CM

## 2023-04-28 DIAGNOSIS — M79.662 PAIN IN BOTH LOWER LEGS: ICD-10-CM

## 2023-04-28 PROCEDURE — 93925 LOWER EXTREMITY STUDY: CPT | Mod: TC

## 2023-07-03 ENCOUNTER — OFFICE VISIT (OUTPATIENT)
Dept: FAMILY MEDICINE | Facility: CLINIC | Age: 73
End: 2023-07-03
Payer: MEDICARE

## 2023-07-03 VITALS
SYSTOLIC BLOOD PRESSURE: 126 MMHG | WEIGHT: 174 LBS | HEART RATE: 68 BPM | BODY MASS INDEX: 32.85 KG/M2 | DIASTOLIC BLOOD PRESSURE: 75 MMHG | HEIGHT: 61 IN | OXYGEN SATURATION: 98 %

## 2023-07-03 DIAGNOSIS — J45.20 MILD INTERMITTENT ASTHMA WITHOUT COMPLICATION: Primary | Chronic | ICD-10-CM

## 2023-07-03 PROCEDURE — 99213 PR OFFICE/OUTPT VISIT, EST, LEVL III, 20-29 MIN: ICD-10-PCS | Mod: ,,, | Performed by: FAMILY MEDICINE

## 2023-07-03 PROCEDURE — 99213 OFFICE O/P EST LOW 20 MIN: CPT | Mod: ,,, | Performed by: FAMILY MEDICINE

## 2023-07-03 RX ORDER — MONTELUKAST SODIUM 10 MG/1
10 TABLET ORAL NIGHTLY
Qty: 30 TABLET | Refills: 0 | Status: SHIPPED | OUTPATIENT
Start: 2023-07-03 | End: 2023-07-26

## 2023-07-03 RX ORDER — ALBUTEROL SULFATE 90 UG/1
2 AEROSOL, METERED RESPIRATORY (INHALATION) EVERY 6 HOURS PRN
Qty: 8 G | Refills: 3 | Status: SHIPPED | OUTPATIENT
Start: 2023-07-03

## 2023-07-03 NOTE — PROGRESS NOTES
Patient ID: [id number]     Chief Complaint: Cough (Has been having a cough for a few weeks, says she can hear rattling and whistling when she breathes)        HPI:     Kayla Wall is a 72 y.o. female here today for Cough (Has been having a cough for a few weeks, says she can hear rattling and whistling when she breathes).         ----------------------------  Anxiety  Asthma  Basal cell carcinoma (BCC)  Bursitis  Carpal tunnel syndrome, bilateral upper limbs  Elevated diaphragm  Fatty liver  GERD (gastroesophageal reflux disease)  Gout  H. pylori infection  HLD (hyperlipidemia)  HTN (hypertension)  Hypothyroidism  Impingement syndrome of right shoulder  ASHLEY (obstructive sleep apnea)  Osteoarthritis  Primary osteoarthritis of right shoulder  SOB (shortness of breath)     Past Surgical History:   Procedure Laterality Date    APPENDECTOMY  1964    BASAL CELL CARCINOMA EXCISION Left     nose - Dr Kenney Benitez    BASAL CELL CARCINOMA EXCISION Right 2023    nose - Dr Kenney Benitez    CARDIAC CATHETERIZATION  2021    Dr Yobani Serrano    CARPAL TUNNEL RELEASE Left 2022    Dr Epifanio Ivy    CARPAL TUNNEL RELEASE Right 2017    Dr Petey Saenz     SECTION  1976     SECTION  1971     SECTION  1968    CHOLECYSTECTOMY  2016    Dr Darnell Reich    DISSECTION OF NECK  1989    HYSTERECTOMY  1978    MYELOGRAPHY  10/22/1999    PLANTAR FASCIA RELEASE Left 2019    ROTATOR CUFF REPAIR Right 2017    Dr Kassy Hernadez    SHOULDER ARTHROSCOPY W/ SUBACROMIAL DECOMPRESSION AND DISTAL CLAVICLE EXCISION Right 2017    Dr Kassy Hernadez    TONSILLECTOMY  1958       Review of patient's allergies indicates:   Allergen Reactions    Adhesive tape-silicones      Other reaction(s): blisters skin    Adhesive Rash     Other reaction(s): blisters skin       Outpatient Medications Marked as Taking for the 7/3/23 encounter (Office Visit) with Anthony Leslie DO    Medication Sig Dispense Refill    allopurinoL (ZYLOPRIM) 100 MG tablet Take 1 tablet (100 mg total) by mouth 3 (three) times daily. 90 tablet 5    amLODIPine (NORVASC) 2.5 MG tablet Take 2.5 mg by mouth once daily.      bisoprolol (ZEBETA) 5 MG tablet Take 5 mg by mouth once daily.      calcium carbonate (OS-YAHAIRA) 600 mg calcium (1,500 mg) Tab Take 600 mg by mouth once daily.      cholecalciferol, vitamin D3, (VITAMIN D3) 50 mcg (2,000 unit) Cap capsule Take 2,000 Units by mouth once daily.      doxepin (SINEQUAN) 10 MG capsule TAKE 1 CAPSULE BY MOUTH EVERY DAY 90 capsule 1    DULoxetine (CYMBALTA) 30 MG capsule Take 30 mg by mouth once daily.      DULoxetine (CYMBALTA) 60 MG capsule Take 60 mg by mouth once daily.      furosemide (LASIX) 20 MG tablet Take 20 mg by mouth once daily.      levothyroxine (SYNTHROID) 25 MCG tablet TAKE 1 TABLET BY MOUTH ONCE DAILY. 90 tablet 1    LORazepam (ATIVAN) 1 MG tablet TAKE 1 TABLET AT BEDTIME 30 tablet 5    [DISCONTINUED] albuterol (VENTOLIN HFA) 90 mcg/actuation inhaler Inhale 2 puffs into the lungs every 6 (six) hours as needed for Wheezing. Rescue 8 g 0       Social History     Socioeconomic History    Marital status:    Tobacco Use    Smoking status: Never    Smokeless tobacco: Never   Substance and Sexual Activity    Alcohol use: Not Currently    Drug use: Never    Sexual activity: Not Currently        Family History   Problem Relation Age of Onset    Heart disease Mother     Heart failure Mother     Hypertension Mother     Lung cancer Father 49        Metastatic    No Known Problems Sister     Diabetes Mellitus Maternal Grandmother     Asthma Daughter     Thyroid disease Daughter     Hypertension Son         Patient Care Team:  Anthony Leslie DO as PCP - General (Family Medicine)  Yobani Serrano MD as Consulting Physician (Cardiology)  Nedra Rushing MD (Dermatology)  TONI Mccord (Endocrinology)  TONI Pacheco (Endocrinology)  Hola VAUGHN  "MD Sophie as Consulting Physician (Orthopedic Surgery)  Low Jimenez MD as Consulting Physician (Gastroenterology)  Kenney Benitez MD as Consulting Physician (Dermatology)  Nedra Castillo MD as Consulting Physician (Rheumatology)     Subjective:     Review of Systems   Constitutional:  Negative for chills and fever.   Respiratory:  Positive for shortness of breath and wheezing.    Cardiovascular:  Negative for chest pain.   Gastrointestinal:  Negative for constipation and diarrhea.   Neurological:  Negative for headaches.   Psychiatric/Behavioral:  The patient does not have insomnia.      See HPI for details  All Other ROS: Negative except as stated in HPI.       Objective:     /75   Pulse 68   Ht 5' 0.63" (1.54 m)   Wt 78.9 kg (174 lb)   SpO2 98%   BMI 33.28 kg/m²     Physical Exam  Vitals reviewed.   Constitutional:       General: She is not in acute distress.     Appearance: Normal appearance.   Cardiovascular:      Rate and Rhythm: Normal rate and regular rhythm.      Heart sounds: No murmur heard.    No friction rub. No gallop.   Pulmonary:      Effort: No respiratory distress.      Breath sounds: No wheezing, rhonchi or rales.   Musculoskeletal:         General: No swelling, tenderness or deformity.      Right lower leg: No edema.      Left lower leg: No edema.   Skin:     General: Skin is warm and dry.      Findings: No lesion or rash.   Neurological:      General: No focal deficit present.      Mental Status: She is alert.   Psychiatric:         Mood and Affect: Mood normal.       Assessment/Plan:     1. Mild intermittent asthma without complication  -     albuterol (VENTOLIN HFA) 90 mcg/actuation inhaler; Inhale 2 puffs into the lungs every 6 (six) hours as needed for Wheezing. Rescue  Dispense: 8 g; Refill: 3  -     montelukast (SINGULAIR) 10 mg tablet; Take 1 tablet (10 mg total) by mouth every evening.  Dispense: 30 tablet; Refill: 0    Will try singulair for a few weeks, if no " improvement in daily symptoms, will try starting patient on a daily ICS.       Follow up:     Follow up if symptoms worsen or fail to improve. In addition to their scheduled follow up, the patient has also been instructed to follow up on as needed basis.

## 2023-07-21 RX ORDER — LORAZEPAM 1 MG/1
1 TABLET ORAL NIGHTLY
Qty: 30 TABLET | Refills: 5 | Status: SHIPPED | OUTPATIENT
Start: 2023-07-21 | End: 2024-01-18

## 2023-07-21 NOTE — TELEPHONE ENCOUNTER
----- Message from Krystin Dennison sent at 7/21/2023  9:43 AM CDT -----  Regarding: refill request  LORazepam (ATIVAN) 1 MG tablet send to [facility]

## 2023-07-26 DIAGNOSIS — J45.20 MILD INTERMITTENT ASTHMA WITHOUT COMPLICATION: Chronic | ICD-10-CM

## 2023-07-26 RX ORDER — MONTELUKAST SODIUM 10 MG/1
TABLET ORAL
Qty: 30 TABLET | Refills: 11 | Status: SHIPPED | OUTPATIENT
Start: 2023-07-26

## 2023-08-02 ENCOUNTER — ANESTHESIA EVENT (OUTPATIENT)
Dept: SURGERY | Facility: HOSPITAL | Age: 73
End: 2023-08-02
Payer: MEDICARE

## 2023-08-02 NOTE — ANESTHESIA PREPROCEDURE EVALUATION
08/02/2023  Kayla Wall is a 72 y.o., female.      Pre-op Assessment    I have reviewed the Patient Summary Reports.    I have reviewed the NPO Status.   I have reviewed the Medications.     Review of Systems  Anesthesia Hx:  No problems with previous Anesthesia  Denies Family Hx of Anesthesia complications.   Denies Personal Hx of Anesthesia complications.   Social:  Non-Smoker    Cardiovascular:  Cardiovascular Normal     Pulmonary:  Pulmonary Normal    Renal/:  Renal/ Normal     Hepatic/GI:  Hepatic/GI Normal    Musculoskeletal:  Musculoskeletal Normal    Neurological:  Neurology Normal    Endocrine:  Endocrine Normal    Psych:  Psychiatric Normal           Physical Exam  General: Well nourished, Cooperative, Alert and Oriented    Airway:  Mallampati: I   Mouth Opening: Normal  TM Distance: Normal  Tongue: Normal  Neck ROM: Normal ROM    Dental:  Intact    Chest/Lungs:  Normal Respiratory Rate    Heart:  Rate: Normal    Musculoskeletal:Normal mobility      Anesthesia Plan  Type of Anesthesia, risks & benefits discussed:    Anesthesia Type: MAC  Intra-op Monitoring Plan: Standard ASA Monitors  Post Op Pain Control Plan: multimodal analgesia  Induction:  IV  Informed Consent: Informed consent signed with the Patient and all parties understand the risks and agree with anesthesia plan.  All questions answered.   ASA Score: 3  Day of Surgery Review of History & Physical: H&P Update referred to the surgeon/provider.  Anesthesia Plan Notes: Anesthesia plan was discussed with patient and/or representative. Risks and alternatives were discussed including the possibility of alteration of plan.     Ready For Surgery From Anesthesia Perspective.     .

## 2023-08-03 ENCOUNTER — HOSPITAL ENCOUNTER (OUTPATIENT)
Facility: HOSPITAL | Age: 73
Discharge: HOME | End: 2023-08-03
Attending: INTERNAL MEDICINE | Admitting: INTERNAL MEDICINE
Payer: MEDICARE

## 2023-08-03 ENCOUNTER — ANESTHESIA (OUTPATIENT)
Dept: SURGERY | Facility: HOSPITAL | Age: 73
End: 2023-08-03
Payer: MEDICARE

## 2023-08-03 VITALS
SYSTOLIC BLOOD PRESSURE: 146 MMHG | WEIGHT: 170.81 LBS | OXYGEN SATURATION: 93 % | HEART RATE: 70 BPM | TEMPERATURE: 98 F | RESPIRATION RATE: 18 BRPM | DIASTOLIC BLOOD PRESSURE: 66 MMHG | HEIGHT: 60 IN | BODY MASS INDEX: 33.54 KG/M2

## 2023-08-03 DIAGNOSIS — Z12.11 SCREENING FOR COLON CANCER: Primary | ICD-10-CM

## 2023-08-03 PROCEDURE — D9220AH HC ANESTHESIA PROFESSIONAL FEE: Mod: QZ,P3,QS | Performed by: NURSE ANESTHETIST, CERTIFIED REGISTERED

## 2023-08-03 PROCEDURE — 63600175 PHARM REV CODE 636 W HCPCS: Performed by: NURSE ANESTHETIST, CERTIFIED REGISTERED

## 2023-08-03 PROCEDURE — 37000009 HC ANESTHESIA EA ADD 15 MINS: Performed by: INTERNAL MEDICINE

## 2023-08-03 PROCEDURE — 25000003 PHARM REV CODE 250: Performed by: NURSE ANESTHETIST, CERTIFIED REGISTERED

## 2023-08-03 PROCEDURE — G0121 COLON CA SCRN NOT HI RSK IND: HCPCS | Performed by: INTERNAL MEDICINE

## 2023-08-03 PROCEDURE — 37000008 HC ANESTHESIA 1ST 15 MINUTES: Performed by: INTERNAL MEDICINE

## 2023-08-03 PROCEDURE — 00812 ANES LWR INTST SCR COLSC: CPT | Mod: QZ,P3,QS | Performed by: NURSE ANESTHETIST, CERTIFIED REGISTERED

## 2023-08-03 RX ORDER — ONDANSETRON 2 MG/ML
INJECTION INTRAMUSCULAR; INTRAVENOUS
Status: DISCONTINUED | OUTPATIENT
Start: 2023-08-03 | End: 2023-08-03

## 2023-08-03 RX ORDER — LIDOCAINE HYDROCHLORIDE 10 MG/ML
INJECTION, SOLUTION EPIDURAL; INFILTRATION; INTRACAUDAL; PERINEURAL
Status: DISCONTINUED | OUTPATIENT
Start: 2023-08-03 | End: 2023-08-03

## 2023-08-03 RX ORDER — SODIUM CHLORIDE 9 MG/ML
INJECTION, SOLUTION INTRAVENOUS CONTINUOUS
Status: DISCONTINUED | OUTPATIENT
Start: 2023-08-03 | End: 2023-08-03 | Stop reason: HOSPADM

## 2023-08-03 RX ORDER — PROPOFOL 10 MG/ML
VIAL (ML) INTRAVENOUS
Status: DISCONTINUED | OUTPATIENT
Start: 2023-08-03 | End: 2023-08-03

## 2023-08-03 RX ADMIN — LIDOCAINE HYDROCHLORIDE 20 MG: 10 INJECTION, SOLUTION EPIDURAL; INFILTRATION; INTRACAUDAL; PERINEURAL at 10:08

## 2023-08-03 RX ADMIN — SODIUM CHLORIDE: 9 INJECTION, SOLUTION INTRAVENOUS at 09:08

## 2023-08-03 RX ADMIN — ONDANSETRON HYDROCHLORIDE 4 MG: 2 SOLUTION INTRAMUSCULAR; INTRAVENOUS at 10:08

## 2023-08-03 RX ADMIN — PROPOFOL 100 MG: 10 INJECTION, EMULSION INTRAVENOUS at 10:08

## 2023-08-03 RX ADMIN — PROPOFOL 150 MG: 10 INJECTION, EMULSION INTRAVENOUS at 10:08

## 2023-08-03 NOTE — H&P
Endoscopy History and Physical    PCP - Anthony Leslie DO    Procedure - colonoscopy   Sedation: MAC  ASA - per anesthesia  Mallampati - per anesthesia  History of Anesthesia problems - no  Family history Anesthesia problems -  no     HPI:  This is a 72 y.o. female here for evaluation of: screening     Reflux - no  Dysphagia - no  Abdominal pain - no  Diarrhea - no    ROS:  Constitutional: No fevers, chills, No weight loss  ENT: No allergies  CV: No chest pain  Pulm: No cough, No shortness of breath  Ophtho: No vision changes  GI: see HPI  Medical History:  has a past medical history of Anxiety, Asthma, Basal cell carcinoma (BCC), Bursitis, Carpal tunnel syndrome, bilateral upper limbs, Elevated diaphragm, Fatty liver, GERD (gastroesophageal reflux disease), Gout, H. pylori infection, HLD (hyperlipidemia), HTN (hypertension), Hypothyroidism, Impingement syndrome of right shoulder, ASHLEY (obstructive sleep apnea), Osteoarthritis, Primary osteoarthritis of right shoulder, and SOB (shortness of breath).    Surgical History:  has a past surgical history that includes Tonsillectomy (); Cholecystectomy (2016); Appendectomy (); Hysterectomy (); Carpal tunnel release (Left, 2022); Plantar fascia release (Left, ); Cardiac catheterization (2021);  section ();  section ();  section (); Rotator cuff repair (Right, 2017); Shoulder arthroscopy w/ subacromial decompression and distal clavicle excision (Right, 2017); Dissection of neck (); Carpal tunnel release (Right, ); Myelography (10/22/1999); Excision basal cell carcinoma (Left, ); and Excision basal cell carcinoma (Right, 2023).    Family History: family history includes Asthma in her daughter; Diabetes Mellitus in her maternal grandmother; Heart disease in her mother; Heart failure in her mother; Hypertension in her mother and son; Lung cancer (age of onset: 49) in her  father; No Known Problems in her sister; Thyroid disease in her daughter.. Otherwise no colon cancer, inflammatory bowel disease, or GI malignancies.    Social History:  reports that she has never smoked. She has never used smokeless tobacco. She reports that she does not currently use alcohol. She reports that she does not use drugs.    Review of patient's allergies indicates:   Allergen Reactions    Adhesive tape-silicones      Other reaction(s): blisters skin    Adhesive Rash     Other reaction(s): blisters skin       Medications:   Medications Prior to Admission   Medication Sig Dispense Refill Last Dose    amLODIPine (NORVASC) 2.5 MG tablet Take 2.5 mg by mouth once daily.   8/3/2023    levothyroxine (SYNTHROID) 25 MCG tablet TAKE 1 TABLET BY MOUTH ONCE DAILY. 90 tablet 1 8/3/2023    albuterol (VENTOLIN HFA) 90 mcg/actuation inhaler Inhale 2 puffs into the lungs every 6 (six) hours as needed for Wheezing. Rescue 8 g 3     allopurinoL (ZYLOPRIM) 100 MG tablet Take 1 tablet (100 mg total) by mouth 3 (three) times daily. 90 tablet 5     atorvastatin (LIPITOR) 20 MG tablet Take 20 mg by mouth once daily.       bisoprolol (ZEBETA) 5 MG tablet Take 5 mg by mouth once daily.       calcium carbonate (OS-YAHAIRA) 600 mg calcium (1,500 mg) Tab Take 600 mg by mouth once daily.       cholecalciferol, vitamin D3, (VITAMIN D3) 50 mcg (2,000 unit) Cap capsule Take 2,000 Units by mouth once daily.       doxepin (SINEQUAN) 10 MG capsule TAKE 1 CAPSULE BY MOUTH EVERY DAY 90 capsule 1     DULoxetine (CYMBALTA) 30 MG capsule Take 30 mg by mouth once daily.       DULoxetine (CYMBALTA) 60 MG capsule Take 60 mg by mouth once daily.       furosemide (LASIX) 20 MG tablet Take 20 mg by mouth once daily.       LORazepam (ATIVAN) 1 MG tablet Take 1 tablet (1 mg total) by mouth every evening. 30 tablet 5     montelukast (SINGULAIR) 10 mg tablet TAKE 1 TABLET BY MOUTH EVERY DAY IN THE EVENING 30 tablet 11        Objective Findings:    Vital  Signs: Per nursing notes.    Physical Exam:  General Appearance: Well appearing in no acute distress  Head:   Normocephalic, without obvious abnormality  Eyes:    No scleral icterus  Airway: Open  Neck: No restriction in mobility  Lungs: CTA bilaterally in anterior and posterior fields, no wheezes, no crackles.  Heart:  Regular rate and rhythm, S1, S2 normal, no murmurs heard  Abdomen: Soft, non tender, non distended      Labs:  Lab Results   Component Value Date    WBC 7.7 12/28/2022    HGB 13.1 12/28/2022    HCT 38.5 12/28/2022     12/28/2022    CHOL 207 (H) 06/22/2022    TRIG 307 (H) 06/22/2022    HDL 44 06/22/2022    ALT 34 12/28/2022    AST 31 12/28/2022     12/28/2022    K 3.7 12/28/2022    CREATININE 0.91 12/28/2022    BUN 13.0 12/28/2022    CO2 26 12/28/2022    TSH 2.2383 04/27/2022    INR 1.00 04/28/2021    HGBA1C 5.8 06/17/2021         I have explained the risks and benefits of endoscopy procedures to the patient including but not limited to bleeding, perforation, infection, and death.    Thank you so much for allowing me to participate in the care of Kayla RANDEE Jimenez MD

## 2023-08-03 NOTE — PROCEDURES
Colonoscopy Procedure Note    Date of procedure: 08/03/2023     Surgeon: Low Jimenez    Procedure: Colonoscopy    Indications: screening colonoscopy        Post op Diagnosis: sigmoid diverticulosis        Sedation: Per anesthesia:       Procedure Details: The patient was brought to the endoscopy suite after the risks, benefits, and alternatives of the procedure were described and the patient was given the opportunity to ask questions and signed informed consent. Patient was positioned in the left lateral decubitus position, continuous monitoring was initiated, and supplemental oxygen was provided via nasal cannula. Adequate sedation was achieved with the medications documented in chart and titrated during the procedure. A digital rectal exam was performed. Under direct visualization the colonoscope was introduced into the rectum and advanced to the cecum. The ileocecal valve and appendiceal orifice were identified. The terminal ileum was not intubated. The scope was withdrawn, and the mucosa was examined in a circular fashion. Retroflexion was done in the rectum. Air was evacuated from the colon and the procedure was completed. The patient tolerated the procedure well and was transferred to the recovery area in stable condition.     Findings:  Sigmoid diverticulosis     Impression and Recommendations:   Repeat colonoscopy in 10 years pending health        Low Jimenez MD

## 2023-08-03 NOTE — DISCHARGE SUMMARY
[facility]  Discharge Note  Short Stay    Procedure(s) (LRB):  COLONOSCOPY (N/A)      OUTCOME: Patient tolerated treatment/procedure well without complication and is now ready for discharge.    DISPOSITION: Home or Self Care    FINAL DIAGNOSIS:  screening colonoscopy     FOLLOWUP:  repeat colonoscopy in 10 years pending health     DISCHARGE INSTRUCTIONS:  No discharge procedures on file.     TIME SPENT ON DISCHARGE: 15 minutes

## 2023-08-03 NOTE — DISCHARGE INSTRUCTIONS
Do not drive, operate heavy machinery, or sign important documents,    Repeat colonoscopy in 10 years.    You have diverticulosis. See paperwork inside discharge paperwork.

## 2023-08-03 NOTE — ANESTHESIA POSTPROCEDURE EVALUATION
Anesthesia Post Evaluation    Patient: Kayla F Duke    Procedure(s) Performed: Procedure(s) (LRB):  COLONOSCOPY (N/A)    Final Anesthesia Type: MAC      Patient location during evaluation: floor  Patient participation: Yes- Able to Participate  Level of consciousness: awake and alert  Post-procedure vital signs: reviewed and stable  Pain management: adequate  Airway patency: patent    PONV status at discharge: No PONV  Anesthetic complications: no      Cardiovascular status: blood pressure returned to baseline  Respiratory status: unassisted  Hydration status: euvolemic  Follow-up not needed.          Vitals Value Taken Time   BP  08/03/23 1108   Temp  08/03/23 1108   Pulse  08/03/23 1108   Resp  08/03/23 1108   SpO2  08/03/23 1108         No case tracking events are documented in the log.      Pain/Austyn Score: No data recorded

## 2023-09-12 DIAGNOSIS — Z13.6 ENCOUNTER FOR LIPID SCREENING FOR CARDIOVASCULAR DISEASE: ICD-10-CM

## 2023-09-12 DIAGNOSIS — E03.9 HYPOTHYROIDISM, ADULT: ICD-10-CM

## 2023-09-12 DIAGNOSIS — Z11.4 ENCOUNTER FOR SCREENING FOR HIV: ICD-10-CM

## 2023-09-12 DIAGNOSIS — Z11.59 NEED FOR HEPATITIS C SCREENING TEST: ICD-10-CM

## 2023-09-12 DIAGNOSIS — Z13.220 ENCOUNTER FOR LIPID SCREENING FOR CARDIOVASCULAR DISEASE: ICD-10-CM

## 2023-09-12 DIAGNOSIS — Z00.00 WELLNESS EXAMINATION: ICD-10-CM

## 2023-09-16 DIAGNOSIS — E03.9 HYPOTHYROIDISM, UNSPECIFIED TYPE: ICD-10-CM

## 2023-09-18 ENCOUNTER — LAB VISIT (OUTPATIENT)
Dept: LAB | Facility: HOSPITAL | Age: 73
End: 2023-09-18
Attending: FAMILY MEDICINE
Payer: MEDICARE

## 2023-09-18 DIAGNOSIS — Z13.6 ENCOUNTER FOR LIPID SCREENING FOR CARDIOVASCULAR DISEASE: ICD-10-CM

## 2023-09-18 DIAGNOSIS — Z13.220 ENCOUNTER FOR LIPID SCREENING FOR CARDIOVASCULAR DISEASE: ICD-10-CM

## 2023-09-18 DIAGNOSIS — E03.9 HYPOTHYROIDISM, ADULT: ICD-10-CM

## 2023-09-18 DIAGNOSIS — Z11.59 NEED FOR HEPATITIS C SCREENING TEST: ICD-10-CM

## 2023-09-18 DIAGNOSIS — Z11.4 ENCOUNTER FOR SCREENING FOR HIV: ICD-10-CM

## 2023-09-18 DIAGNOSIS — Z00.00 WELLNESS EXAMINATION: ICD-10-CM

## 2023-09-18 LAB
ALBUMIN SERPL-MCNC: 4.1 G/DL (ref 3.4–4.8)
ALBUMIN/GLOB SERPL: 1.1 RATIO (ref 1.1–2)
ALP SERPL-CCNC: 93 UNIT/L (ref 40–150)
ALT SERPL-CCNC: 35 UNIT/L (ref 0–55)
AST SERPL-CCNC: 28 UNIT/L (ref 5–34)
BASOPHILS # BLD AUTO: 0.04 X10(3)/MCL
BASOPHILS NFR BLD AUTO: 0.5 %
BILIRUB SERPL-MCNC: 0.6 MG/DL
BUN SERPL-MCNC: 15 MG/DL (ref 9.8–20.1)
CALCIUM SERPL-MCNC: 9.8 MG/DL (ref 8.4–10.2)
CHLORIDE SERPL-SCNC: 105 MMOL/L (ref 98–107)
CHOLEST SERPL-MCNC: 326 MG/DL
CHOLEST/HDLC SERPL: 8 {RATIO} (ref 0–5)
CO2 SERPL-SCNC: 26 MMOL/L (ref 23–31)
CREAT SERPL-MCNC: 0.94 MG/DL (ref 0.55–1.02)
EOSINOPHIL # BLD AUTO: 0.12 X10(3)/MCL (ref 0–0.9)
EOSINOPHIL NFR BLD AUTO: 1.6 %
ERYTHROCYTE [DISTWIDTH] IN BLOOD BY AUTOMATED COUNT: 13.1 % (ref 11.5–17)
GFR SERPLBLD CREATININE-BSD FMLA CKD-EPI: >60 MLS/MIN/1.73/M2
GLOBULIN SER-MCNC: 3.6 GM/DL (ref 2.4–3.5)
GLUCOSE SERPL-MCNC: 119 MG/DL (ref 82–115)
HCT VFR BLD AUTO: 42.4 % (ref 37–47)
HCV AB SERPL QL IA: NONREACTIVE
HDLC SERPL-MCNC: 39 MG/DL (ref 35–60)
HGB BLD-MCNC: 14.5 G/DL (ref 12–16)
HIV 1+2 AB+HIV1 P24 AG SERPL QL IA: NONREACTIVE
IMM GRANULOCYTES # BLD AUTO: 0.02 X10(3)/MCL (ref 0–0.04)
IMM GRANULOCYTES NFR BLD AUTO: 0.3 %
LDLC SERPL CALC-MCNC: 215 MG/DL (ref 50–140)
LYMPHOCYTES # BLD AUTO: 3.37 X10(3)/MCL (ref 0.6–4.6)
LYMPHOCYTES NFR BLD AUTO: 43.8 %
MCH RBC QN AUTO: 31.5 PG (ref 27–31)
MCHC RBC AUTO-ENTMCNC: 34.2 G/DL (ref 33–36)
MCV RBC AUTO: 92.2 FL (ref 80–94)
MONOCYTES # BLD AUTO: 0.77 X10(3)/MCL (ref 0.1–1.3)
MONOCYTES NFR BLD AUTO: 10 %
NEUTROPHILS # BLD AUTO: 3.38 X10(3)/MCL (ref 2.1–9.2)
NEUTROPHILS NFR BLD AUTO: 43.8 %
NRBC BLD AUTO-RTO: 0 %
PLATELET # BLD AUTO: 259 X10(3)/MCL (ref 130–400)
PMV BLD AUTO: 10.4 FL (ref 7.4–10.4)
POTASSIUM SERPL-SCNC: 4.1 MMOL/L (ref 3.5–5.1)
PROT SERPL-MCNC: 7.7 GM/DL (ref 5.8–7.6)
RBC # BLD AUTO: 4.6 X10(6)/MCL (ref 4.2–5.4)
SODIUM SERPL-SCNC: 139 MMOL/L (ref 136–145)
TRIGL SERPL-MCNC: 362 MG/DL (ref 37–140)
TSH SERPL-ACNC: 3.14 UIU/ML (ref 0.35–4.94)
VLDLC SERPL CALC-MCNC: 72 MG/DL
WBC # SPEC AUTO: 7.7 X10(3)/MCL (ref 4.5–11.5)

## 2023-09-18 PROCEDURE — 80061 LIPID PANEL: CPT

## 2023-09-18 PROCEDURE — 85025 COMPLETE CBC W/AUTO DIFF WBC: CPT

## 2023-09-18 PROCEDURE — 84443 ASSAY THYROID STIM HORMONE: CPT

## 2023-09-18 PROCEDURE — 36415 COLL VENOUS BLD VENIPUNCTURE: CPT

## 2023-09-18 PROCEDURE — 87389 HIV-1 AG W/HIV-1&-2 AB AG IA: CPT

## 2023-09-18 PROCEDURE — 86803 HEPATITIS C AB TEST: CPT

## 2023-09-18 PROCEDURE — 80053 COMPREHEN METABOLIC PANEL: CPT

## 2023-09-18 RX ORDER — LEVOTHYROXINE SODIUM 25 UG/1
25 TABLET ORAL
Qty: 90 TABLET | Refills: 1 | Status: SHIPPED | OUTPATIENT
Start: 2023-09-18 | End: 2024-03-13

## 2023-09-20 DIAGNOSIS — F41.9 ANXIETY: ICD-10-CM

## 2023-09-20 RX ORDER — DOXEPIN HYDROCHLORIDE 10 MG/1
CAPSULE ORAL
Qty: 90 CAPSULE | Refills: 1 | Status: SHIPPED | OUTPATIENT
Start: 2023-09-20 | End: 2024-03-13

## 2023-09-21 ENCOUNTER — OFFICE VISIT (OUTPATIENT)
Dept: FAMILY MEDICINE | Facility: CLINIC | Age: 73
End: 2023-09-21
Payer: MEDICARE

## 2023-09-21 VITALS
SYSTOLIC BLOOD PRESSURE: 124 MMHG | OXYGEN SATURATION: 95 % | DIASTOLIC BLOOD PRESSURE: 72 MMHG | HEIGHT: 60 IN | RESPIRATION RATE: 18 BRPM | TEMPERATURE: 98 F | BODY MASS INDEX: 34.16 KG/M2 | WEIGHT: 174 LBS | HEART RATE: 66 BPM

## 2023-09-21 DIAGNOSIS — G47.33 OSA (OBSTRUCTIVE SLEEP APNEA): Chronic | ICD-10-CM

## 2023-09-21 DIAGNOSIS — E78.2 MIXED HYPERLIPIDEMIA: Chronic | ICD-10-CM

## 2023-09-21 DIAGNOSIS — J45.40 MODERATE PERSISTENT ASTHMA WITHOUT COMPLICATION: Chronic | ICD-10-CM

## 2023-09-21 DIAGNOSIS — Z00.00 ROUTINE GENERAL MEDICAL EXAMINATION AT A HEALTH CARE FACILITY: Primary | ICD-10-CM

## 2023-09-21 PROCEDURE — G0439 PR MEDICARE ANNUAL WELLNESS SUBSEQUENT VISIT: ICD-10-PCS | Mod: ,,, | Performed by: FAMILY MEDICINE

## 2023-09-21 PROCEDURE — G0439 PPPS, SUBSEQ VISIT: HCPCS | Mod: ,,, | Performed by: FAMILY MEDICINE

## 2023-09-21 RX ORDER — FLUOROURACIL 50 MG/G
0.5 CREAM TOPICAL NIGHTLY PRN
COMMUNITY
Start: 2023-08-15

## 2023-09-21 RX ORDER — FENOFIBRATE 160 MG/1
160 TABLET ORAL DAILY
Qty: 90 TABLET | Refills: 3 | Status: SHIPPED | OUTPATIENT
Start: 2023-09-21 | End: 2023-12-18

## 2023-09-21 NOTE — LETTER
AUTHORIZATION FOR RELEASE OF   CONFIDENTIAL INFORMATION    Dear Dr. Castillo,    We are seeing Kayla Wall, date of birth 1950, in the clinic at [facility]. Anthony Leslie DO is the patient's PCP. Kayla Wall has an outstanding lab/procedure at the time we reviewed her chart. In order to help keep her health information updated, she has authorized us to request the following medical record(s):        (  )  MAMMOGRAM                                      (  )  COLONOSCOPY      (  )  PAP SMEAR                                          (X)  OUTSIDE LAB RESULTS     (X)  DEXA SCAN                                          (  )  EYE EXAM            (  )  FOOT EXAM                                          (  )  ENTIRE RECORD     (  )  OUTSIDE IMMUNIZATIONS                 (X) LAST OFFICE NOTE         Please fax records to [facility], [place], [place], DO, [phone].              Patient Name: Kayla Wall  : 1950  Patient Phone #: [phone]

## 2023-09-21 NOTE — PROGRESS NOTES
Patient ID: [id number]     Chief Complaint: Medicare AWV        HPI:     Kayla Wall is a 72 y.o. female here today for a Medicare Wellness. Cholesterol considerably elevated on recent labs. Frequent falls, last fall over 1 month ago. Stopped taking her atorvastatin some time ago but doesn't recall why she stopped the medication. She seems to recall one doctor telling her to stop the medication and she states she may have had muscle aches.     ----------------------------  Anxiety  Asthma  Basal cell carcinoma (BCC)  Bursitis  Carpal tunnel syndrome, bilateral upper limbs  Elevated diaphragm  Fatty liver  GERD (gastroesophageal reflux disease)  Gout  H. pylori infection  HLD (hyperlipidemia)  HTN (hypertension)  Hypothyroidism  Impingement syndrome of right shoulder  ASHLEY (obstructive sleep apnea)  Osteoarthritis  Primary osteoarthritis of right shoulder  SOB (shortness of breath)     Past Surgical History:   Procedure Laterality Date    APPENDECTOMY  1964    BASAL CELL CARCINOMA EXCISION Left     nose - Dr Kenney Benitez    BASAL CELL CARCINOMA EXCISION Right 2023    nose - Dr Kenney Benitez    CARDIAC CATHETERIZATION  2021    Dr Yobani Serrano    CARPAL TUNNEL RELEASE Left 2022    Dr Epifanio Ivy    CARPAL TUNNEL RELEASE Right 2017    Dr Petey Saenz     SECTION  1976     SECTION  1971     SECTION  1968    CHOLECYSTECTOMY  2016    Dr Darnell Reich    COLONOSCOPY N/A 8/3/2023    Procedure: COLONOSCOPY;  Surgeon: Low Jimenez MD;  Location: [facility];  Service: Gastroenterology;  Laterality: N/A;    DISSECTION OF NECK      HYSTERECTOMY  1978    MYELOGRAPHY  10/22/1999    PLANTAR FASCIA RELEASE Left 2019    ROTATOR CUFF REPAIR Right 2017    Dr Kassy Hernadez    SHOULDER ARTHROSCOPY W/ SUBACROMIAL DECOMPRESSION AND DISTAL CLAVICLE EXCISION Right 2017    Dr Kassy Hernadez    TONSILLECTOMY         Review of patient's allergies  indicates:   Allergen Reactions    Adhesive tape-silicones      Other reaction(s): blisters skin    Adhesive Rash     Other reaction(s): blisters skin       Outpatient Medications Marked as Taking for the 9/21/23 encounter (Office Visit) with Anthony Leslie DO   Medication Sig Dispense Refill    albuterol (VENTOLIN HFA) 90 mcg/actuation inhaler Inhale 2 puffs into the lungs every 6 (six) hours as needed for Wheezing. Rescue 8 g 3    amLODIPine (NORVASC) 2.5 MG tablet Take 2.5 mg by mouth once daily.      bisoprolol (ZEBETA) 5 MG tablet Take 5 mg by mouth once daily.      calcium carbonate (OS-YAHAIRA) 600 mg calcium (1,500 mg) Tab Take 600 mg by mouth once daily.      cholecalciferol, vitamin D3, (VITAMIN D3) 50 mcg (2,000 unit) Cap capsule Take 2,000 Units by mouth once daily.      doxepin (SINEQUAN) 10 MG capsule TAKE 1 CAPSULE BY MOUTH EVERY DAY 90 capsule 1    DULoxetine (CYMBALTA) 30 MG capsule Take 30 mg by mouth once daily.      DULoxetine (CYMBALTA) 60 MG capsule Take 60 mg by mouth once daily.      furosemide (LASIX) 20 MG tablet Take 20 mg by mouth once daily.      levothyroxine (SYNTHROID) 25 MCG tablet TAKE 1 TABLET BY MOUTH EVERY DAY 90 tablet 1    LORazepam (ATIVAN) 1 MG tablet Take 1 tablet (1 mg total) by mouth every evening. 30 tablet 5    montelukast (SINGULAIR) 10 mg tablet TAKE 1 TABLET BY MOUTH EVERY DAY IN THE EVENING 30 tablet 11       Social History     Socioeconomic History    Marital status:    Tobacco Use    Smoking status: Never    Smokeless tobacco: Never   Substance and Sexual Activity    Alcohol use: Not Currently    Drug use: Never    Sexual activity: Not Currently     Social Determinants of Health     Financial Resource Strain: Low Risk  (9/21/2023)    Overall Financial Resource Strain (CARDIA)     Difficulty of Paying Living Expenses: Not hard at all   Food Insecurity: No Food Insecurity (9/21/2023)    Hunger Vital Sign     Worried About Running Out of Food in the Last Year:  Never true     Ran Out of Food in the Last Year: Never true   Transportation Needs: No Transportation Needs (9/21/2023)    PRAPARE - Transportation     Lack of Transportation (Medical): No     Lack of Transportation (Non-Medical): No   Physical Activity: Insufficiently Active (9/21/2023)    Exercise Vital Sign     Days of Exercise per Week: 6 days     Minutes of Exercise per Session: 20 min   Stress: Stress Concern Present (9/21/2023)    [employer] [employer] - Occupational Stress Questionnaire     Feeling of Stress : To some extent   Social Connections: Socially Integrated (9/21/2023)    Social Connection and Isolation Panel [NHANES]     Frequency of Communication with Friends and Family: More than three times a week     Frequency of Social Gatherings with Friends and Family: More than three times a week     Attends Mosque Services: More than 4 times per year     Active Member of Clubs or Organizations: Yes     Attends Club or Organization Meetings: More than 4 times per year     Marital Status:    Housing Stability: Low Risk  (9/21/2023)    Housing Stability Vital Sign     Unable to Pay for Housing in the Last Year: No     Number of Places Lived in the Last Year: 1     Unstable Housing in the Last Year: No        Family History   Problem Relation Age of Onset    Heart disease Mother     Heart failure Mother     Hypertension Mother     Lung cancer Father 49        Metastatic    No Known Problems Sister     Diabetes Mellitus Maternal Grandmother     Asthma Daughter     Thyroid disease Daughter     Hypertension Son         Patient Care Team:  Anthony Leslie DO as PCP - General (Family Medicine)  Yobani Serrano MD as Consulting Physician (Cardiology)  Nedra Rushing MD (Dermatology)  Mona Pedraza FNP (Endocrinology)  Anali Braun FNP (Endocrinology)  Hola Barrios MD as Consulting Physician (Orthopedic Surgery)  Low Jimenez MD as Consulting Physician  (Gastroenterology)  Kenney Benitez MD as Consulting Physician (Dermatology)  Nedra Castillo MD as Consulting Physician (Rheumatology)  Robert Mendez MD as Consulting Physician (Otolaryngology)       Subjective:     Review of Systems   Constitutional:  Negative for chills and fever.   Respiratory:  Positive for cough and shortness of breath.    Cardiovascular:  Negative for chest pain and palpitations.   Gastrointestinal:  Negative for constipation and diarrhea.   Musculoskeletal:  Positive for back pain and falls.   Neurological:  Negative for dizziness and headaches.   Psychiatric/Behavioral:  The patient does not have insomnia.          Patient Reported Health Risk Assessments:  What [age] 70-79  Are you male or female?: Female  During the past four weeks, how much have you been bothered by emotional problems such as feeling anxious, depressed, irritable, sad, or downhearted and blue?: Slightly  During the past five weeks, has your physical and/or emotional health limited your social activities with family, friends, neighbors, or groups?: Not at all  During the past four weeks, how much bodily pain have you generally had?: Mild pain  During the past four weeks, was someone available to help if you needed and wanted help?: Yes, as much as I wanted  During the past four weeks, what was the hardest physical activity you could do for at least two minutes?: Moderate  Can you get to places out of walking distance without help?  (For example, can you travel alone on buses or taxis, or drive your own car?): Yes  Can you go shopping for groceries or clothes without someone's help?: Yes  Can you prepare your own meals?: Yes  Can you do your own housework without help?: Yes  Because of any health problems, do you need the help of another person with your personal care needs such as eating, bathing, dressing, or getting around the house?: No  Can you handle your own money without help?: Yes  During the past  "four weeks, how would you rate your health in general?: Good  How have things been going for you during the past four weeks?: Pretty well  Are you having difficulties driving your car?: No  Do you always fasten your seat belt when you are in a car?: Yes, usually  How often in the past four weeks have you been bothered by falling or dizzy when standing up?: Seldom  How often in the past four weeks have you been bothered by sexual problems?: Never  How often in the past four weeks have you been bothered by trouble eating well?: Never  How often in the past four weeks have you been bothered by teeth or denture problems?: Never  How often in the past four weeks have you been bothered with problems using the telephone?: Never  How often in the past four weeks have you been bothered by tiredness or fatigue?: Sometimes  Have you fallen two or more times in the past year?: No  Are you afraid of falling?: Yes  Are you a smoker?: No  During the past four weeks, how many drinks of wine, beer, or other alcoholic beverages did you have?: No alcohol at all  Do you exercise for about 20 minutes three or more days a week?: Yes, most of the time  Have you been given any information to help you with hazards in your house that might hurt you?: Yes  Have you been given any information to help you with keeping track of your medications?: Yes  How often do you have trouble taking medicines the way you've been told to take them?: I always take them as prescribed  How confident are you that you can control and manage most of your health problems?: Very confident  What is your race? (Check all that apply.):     Objective:     /72   Pulse 66   Temp 98.1 °F (36.7 °C) (Tympanic)   Resp 18   Ht 4' 11.84" (1.52 m)   Wt 78.9 kg (174 lb)   SpO2 95%   BMI 34.16 kg/m²     Physical Exam  Vitals reviewed.   Constitutional:       General: She is not in acute distress.     Appearance: Normal appearance.   Cardiovascular:      Rate " and Rhythm: Normal rate and regular rhythm.      Heart sounds: No murmur heard.     No friction rub. No gallop.   Pulmonary:      Effort: No respiratory distress.      Breath sounds: Decreased air movement present. No wheezing, rhonchi or rales.   Musculoskeletal:         General: No swelling, tenderness or deformity.      Right lower leg: No edema.      Left lower leg: No edema.   Skin:     General: Skin is warm and dry.      Findings: No lesion or rash.   Neurological:      General: No focal deficit present.      Mental Status: She is alert.   Psychiatric:         Mood and Affect: Mood normal.             Assessment:       ICD-10-CM ICD-9-CM   1. Routine general medical examination at a health care facility  Z00.00 V70.0   2. Mixed hyperlipidemia  E78.2 272.2   3. Moderate persistent asthma without complication  J45.40 493.90   4. ASHLEY (obstructive sleep apnea)  G47.33 327.23        Plan:       Medicare Annual Wellness and Personalized Prevention Plan:     Fall Risk + Home Safety + Living Situation + Whisper Test + Depression Screen + CAGE + Cognitive Impairment Screen + ADL Screen + Timed Get Up and Go + Nutrition Screen + PAQ Screen + Health Risk Assessment all reviewed.          No data to display                  9/21/2023     1:15 PM 7/3/2023     2:15 PM 3/16/2023     1:30 PM   Fall Risk Assessment - Outpatient   Mobility Status Ambulatory Ambulatory Ambulatory   Number of falls 0 0 0   Identified as fall risk False False False                   Depression Screening  Over the past two weeks, has the patient felt down, depressed, or hopeless?: No  Over the past two weeks, has the patient felt little interest or pleasure in doing things?: No  Functional Ability/Safety Screening  Was the patient's timed Up & Go test unsteady or longer than 30 seconds?: No  Does the patient need help with phone, transportation, shopping, preparing meals, housework, laundry, meds, or managing money?: No  Does the patient's home  have rugs in the hallway, lack grab bars in the bathroom, lack handrails on the stairs or have poor lighting?: No  Have you noticed any hearing difficulties?: No  Cognitive Function (Assessed through direct observation with due consideration of information obtained by way of patient reports and/or concerns raised by family, friends, caretakers, or others)    Does the patient repeat questions/statements in the same day?: No  Does the patient have trouble remembering the date, year, and time?: No  Does the patient have difficulty managing finances?: No  Does the patient have a decreased sense of direction?: No      Offer of free  was accepted or rejected?: rejected  If  rejected, why?: Patient states understands English    Alcohol/Tobacco Use - Stressed importance of smoking cessation and limiting alcohol intake.  CVD Risk Factors - Reviewed  Obesity/Physical Activity - Encouraged daily 30 minute physical activity x 5 days per week.    Opioid Screening: Patient medication list reviewed, patient is not taking prescription opioids. Patient is not using additional opioids than prescribed. Patient is not at low risk of substance abuse based on this opioid use history.        Health Maintenance Due   Topic Date Due    DEXA Scan  Never done    Shingles Vaccine (1 of 2) Never done    COVID-19 Vaccine (3 - Mixed Product series) 04/27/2021    Influenza Vaccine (1) 09/01/2023     Eye Exam - Recommend annually   Dental Exam - Recommend biannually  Vaccinations -   Immunization History   Administered Date(s) Administered    COVID-19 Vaccine 02/02/2021, 03/02/2021    Influenza - High Dose - PF (65 years and older) 09/24/2016, 10/03/2017, 10/24/2019    Influenza - Quadrivalent - High Dose - PF (65 years and older) 11/02/2020, 11/02/2021, 12/04/2022    Pneumococcal Conjugate - 20 Valent 12/04/2022    Tdap 11/15/2016      Advance Care Planning     Date: 09/21/2023  ACP form provided for patient to  complete and return to office.      1. Routine general medical examination at a health care facility    2. Mixed hyperlipidemia  - fenofibrate 160 MG Tab; Take 1 tablet (160 mg total) by mouth once daily.  Dispense: 90 tablet; Refill: 3  - Comprehensive Metabolic Panel; Future  - Lipid Panel; Future    3. Moderate persistent asthma without complication  - budesonide (PULMICORT FLEXHALER) 90 mcg/actuation AePB; Inhale 2 puffs (180 mcg total) into the lungs 2 (two) times a day. Controller  Dispense: 2 each; Refill: 5    4. ASHLEY (obstructive sleep apnea)  -Compliant with CPAP    Medication List with Changes/Refills   New Medications    BUDESONIDE (PULMICORT FLEXHALER) 90 MCG/ACTUATION AEPB    Inhale 2 puffs (180 mcg total) into the lungs 2 (two) times a day. Controller       Start Date: 9/21/2023 End Date: 9/20/2024    FENOFIBRATE 160 MG TAB    Take 1 tablet (160 mg total) by mouth once daily.       Start Date: 9/21/2023 End Date: 9/20/2024   Current Medications    ALBUTEROL (VENTOLIN HFA) 90 MCG/ACTUATION INHALER    Inhale 2 puffs into the lungs every 6 (six) hours as needed for Wheezing. Rescue       Start Date: 7/3/2023  End Date: --    AMLODIPINE (NORVASC) 2.5 MG TABLET    Take 2.5 mg by mouth once daily.       Start Date: 1/13/2023 End Date: --    BISOPROLOL (ZEBETA) 5 MG TABLET    Take 5 mg by mouth once daily.       Start Date: 3/4/2023  End Date: --    CALCIUM CARBONATE (OS-YAHAIRA) 600 MG CALCIUM (1,500 MG) TAB    Take 600 mg by mouth once daily.       Start Date: --        End Date: --    CHOLECALCIFEROL, VITAMIN D3, (VITAMIN D3) 50 MCG (2,000 UNIT) CAP CAPSULE    Take 2,000 Units by mouth once daily.       Start Date: --        End Date: --    DOXEPIN (SINEQUAN) 10 MG CAPSULE    TAKE 1 CAPSULE BY MOUTH EVERY DAY       Start Date: 9/20/2023 End Date: --    DULOXETINE (CYMBALTA) 30 MG CAPSULE    Take 30 mg by mouth once daily.       Start Date: 3/2/2023  End Date: --    DULOXETINE (CYMBALTA) 60 MG CAPSULE    Take  60 mg by mouth once daily.       Start Date: 1/11/2023 End Date: --    FLUOROURACIL (EFUDEX) 5 % CREAM    Apply 0.5 g topically nightly as needed.       Start Date: 8/15/2023 End Date: --    FUROSEMIDE (LASIX) 20 MG TABLET    Take 20 mg by mouth once daily.       Start Date: --        End Date: --    LEVOTHYROXINE (SYNTHROID) 25 MCG TABLET    TAKE 1 TABLET BY MOUTH EVERY DAY       Start Date: 9/18/2023 End Date: --    LORAZEPAM (ATIVAN) 1 MG TABLET    Take 1 tablet (1 mg total) by mouth every evening.       Start Date: 7/21/2023 End Date: 1/17/2024    MONTELUKAST (SINGULAIR) 10 MG TABLET    TAKE 1 TABLET BY MOUTH EVERY DAY IN THE EVENING       Start Date: 7/26/2023 End Date: --   Discontinued Medications    ATORVASTATIN (LIPITOR) 20 MG TABLET    Take 20 mg by mouth once daily.       Start Date: 12/1/2022 End Date: 9/21/2023      Follow up in about 3 months (around 12/21/2023) for Follow up. In addition to their scheduled follow up, the patient has also been instructed to follow up on as needed basis.     Provided patient with a 5-10 year written screening schedule and personal prevention plan. Recommendations were developed using the USPSTF age appropriate recommendations. Education, counseling, and referrals were provided as needed. After Visit Summary printed and given to patient which includes a list of additional screenings\tests needed.

## 2023-09-26 ENCOUNTER — TELEPHONE (OUTPATIENT)
Dept: FAMILY MEDICINE | Facility: CLINIC | Age: 73
End: 2023-09-26
Payer: MEDICARE

## 2023-09-26 NOTE — TELEPHONE ENCOUNTER
----- Message from Krystin Dennison sent at 9/26/2023  9:13 AM CDT -----  Patient came in for an appointment the other day, doc ordered budesonide (PULMICORT FLEXHALER) 90 mcg/actuation AePB 2 each 5 9/21/2023 9/20/2024  Sig: Inhale 2 puffs (180 mcg total) into the lungs 2 (two) times a day.     She called this morning stating no pharmacies in the area have it in stock, patient is wondering what to do

## 2023-09-28 ENCOUNTER — TELEPHONE (OUTPATIENT)
Dept: FAMILY MEDICINE | Facility: CLINIC | Age: 73
End: 2023-09-28
Payer: MEDICARE

## 2023-09-28 DIAGNOSIS — J45.40 MODERATE PERSISTENT ASTHMA WITHOUT COMPLICATION: Primary | ICD-10-CM

## 2023-09-28 NOTE — TELEPHONE ENCOUNTER
"I called [facility] pharmacy and spoke with the pharmacist. The inhaled corticosteroids tends to be poorly covered and hard to get. Additionally she thinks the patient is in the "donut hole".     I can try jumping the ICS medication and going straight to a combination inhaler such as Breo which is a LABA/ICS combo. Albuterol is a JOHN (short acting) that she could still use as a rescue inhaler.    Her portion for the time being due to the likely "donut hole" would be around $79 for any of the combo inhalers. Unsure if the patient can afford that.   "

## 2023-09-28 NOTE — TELEPHONE ENCOUNTER
----- Message from Winifred Miller sent at 9/28/2023 11:18 AM CDT -----  Regarding: med  budesonide (PULMICORT FLEXHALER) 90 mcg/actuation AePB, Mrs Garza called again asking if doc could put her on something else due to not being able to find the PULMICORT ANYWHERE      Thanks

## 2023-09-29 NOTE — TELEPHONE ENCOUNTER
Notified pt, she would like to start Breo.    I was going to try to order it, but wasn't sure which dose to order.

## 2023-10-02 RX ORDER — FLUTICASONE FUROATE AND VILANTEROL 100; 25 UG/1; UG/1
1 POWDER RESPIRATORY (INHALATION) DAILY
Qty: 60 EACH | Refills: 3 | Status: SHIPPED | OUTPATIENT
Start: 2023-10-02 | End: 2023-10-03 | Stop reason: SDUPTHER

## 2023-10-02 NOTE — TELEPHONE ENCOUNTER
Resent the Breo. Not sure what it was not showing up in her chart. If it is too expensive I can try another medication but if she doesn't have the prescription medicare supplement I suspect none of the inhalers will be covered.

## 2023-10-03 DIAGNOSIS — J45.40 MODERATE PERSISTENT ASTHMA WITHOUT COMPLICATION: ICD-10-CM

## 2023-10-03 RX ORDER — FLUTICASONE FUROATE AND VILANTEROL 100; 25 UG/1; UG/1
1 POWDER RESPIRATORY (INHALATION) DAILY
Qty: 60 EACH | Refills: 3 | Status: SHIPPED | OUTPATIENT
Start: 2023-10-03

## 2023-10-31 RX ORDER — LORAZEPAM 0.5 MG/1
1 TABLET ORAL NIGHTLY
Qty: 60 TABLET | Refills: 0 | Status: SHIPPED | OUTPATIENT
Start: 2023-10-31 | End: 2023-12-21

## 2023-12-06 ENCOUNTER — HOSPITAL ENCOUNTER (OUTPATIENT)
Dept: RADIOLOGY | Facility: HOSPITAL | Age: 73
Discharge: HOME | End: 2023-12-06
Attending: INTERNAL MEDICINE
Payer: MEDICARE

## 2023-12-06 DIAGNOSIS — I65.23 BILATERAL CAROTID ARTERY STENOSIS: ICD-10-CM

## 2023-12-06 PROCEDURE — 93880 EXTRACRANIAL BILAT STUDY: CPT | Mod: TC

## 2023-12-07 LAB — BMD RECOMMENDATION EXT: NORMAL

## 2023-12-13 ENCOUNTER — LAB VISIT (OUTPATIENT)
Dept: LAB | Facility: HOSPITAL | Age: 73
End: 2023-12-13
Attending: INTERNAL MEDICINE
Payer: MEDICARE

## 2023-12-13 DIAGNOSIS — Z79.899 ENCOUNTER FOR LONG-TERM (CURRENT) USE OF OTHER MEDICATIONS: Primary | ICD-10-CM

## 2023-12-13 DIAGNOSIS — E78.2 MIXED HYPERLIPIDEMIA: Chronic | ICD-10-CM

## 2023-12-13 LAB
ALBUMIN SERPL-MCNC: 4.2 G/DL (ref 3.4–4.8)
ALBUMIN/GLOB SERPL: 1.3 RATIO (ref 1.1–2)
ALP SERPL-CCNC: 52 UNIT/L (ref 40–150)
ALT SERPL-CCNC: 41 UNIT/L (ref 0–55)
AST SERPL-CCNC: 33 UNIT/L (ref 5–34)
BILIRUB SERPL-MCNC: 0.5 MG/DL
BUN SERPL-MCNC: 19 MG/DL (ref 9.8–20.1)
CALCIUM SERPL-MCNC: 9.7 MG/DL (ref 8.4–10.2)
CHLORIDE SERPL-SCNC: 105 MMOL/L (ref 98–107)
CHOLEST SERPL-MCNC: 239 MG/DL
CHOLEST/HDLC SERPL: 5 {RATIO} (ref 0–5)
CO2 SERPL-SCNC: 28 MMOL/L (ref 23–31)
CREAT SERPL-MCNC: 1.4 MG/DL (ref 0.55–1.02)
GFR SERPLBLD CREATININE-BSD FMLA CKD-EPI: 40 MLS/MIN/1.73/M2
GLOBULIN SER-MCNC: 3.3 GM/DL (ref 2.4–3.5)
GLUCOSE SERPL-MCNC: 110 MG/DL (ref 82–115)
HDLC SERPL-MCNC: 47 MG/DL (ref 35–60)
LDLC SERPL CALC-MCNC: 162 MG/DL (ref 50–140)
POTASSIUM SERPL-SCNC: 4 MMOL/L (ref 3.5–5.1)
PROT SERPL-MCNC: 7.5 GM/DL (ref 5.8–7.6)
PTH-INTACT SERPL-MCNC: 38.6 PG/ML (ref 8.7–77)
SODIUM SERPL-SCNC: 142 MMOL/L (ref 136–145)
TRIGL SERPL-MCNC: 150 MG/DL (ref 37–140)
VLDLC SERPL CALC-MCNC: 30 MG/DL

## 2023-12-13 PROCEDURE — 80061 LIPID PANEL: CPT

## 2023-12-13 PROCEDURE — 83970 ASSAY OF PARATHORMONE: CPT

## 2023-12-13 PROCEDURE — 80053 COMPREHEN METABOLIC PANEL: CPT

## 2023-12-13 PROCEDURE — 36415 COLL VENOUS BLD VENIPUNCTURE: CPT

## 2023-12-18 ENCOUNTER — DOCUMENTATION ONLY (OUTPATIENT)
Dept: FAMILY MEDICINE | Facility: CLINIC | Age: 73
End: 2023-12-18

## 2023-12-18 ENCOUNTER — OFFICE VISIT (OUTPATIENT)
Dept: FAMILY MEDICINE | Facility: CLINIC | Age: 73
End: 2023-12-18
Payer: MEDICARE

## 2023-12-18 VITALS
HEIGHT: 60 IN | OXYGEN SATURATION: 98 % | DIASTOLIC BLOOD PRESSURE: 85 MMHG | RESPIRATION RATE: 20 BRPM | SYSTOLIC BLOOD PRESSURE: 125 MMHG | BODY MASS INDEX: 34 KG/M2 | WEIGHT: 173.19 LBS | HEART RATE: 66 BPM | TEMPERATURE: 98 F

## 2023-12-18 DIAGNOSIS — E78.2 MIXED HYPERLIPIDEMIA: Primary | Chronic | ICD-10-CM

## 2023-12-18 DIAGNOSIS — R94.4 DECREASED GFR: ICD-10-CM

## 2023-12-18 PROCEDURE — 99214 OFFICE O/P EST MOD 30 MIN: CPT | Mod: ,,,

## 2023-12-18 PROCEDURE — 99214 PR OFFICE/OUTPT VISIT, EST, LEVL IV, 30-39 MIN: ICD-10-PCS | Mod: ,,,

## 2023-12-18 RX ORDER — ROSUVASTATIN CALCIUM 20 MG/1
20 TABLET, COATED ORAL DAILY
Qty: 90 TABLET | Refills: 3 | Status: SHIPPED | OUTPATIENT
Start: 2023-12-18 | End: 2023-12-18

## 2023-12-18 NOTE — ASSESSMENT & PLAN NOTE
Lab Results   Component Value Date    .00 (H) 12/13/2023    TRIG 150 (H) 12/13/2023    HDL 47 12/13/2023    TOTALCHOLEST 5 12/13/2023     Cholesterol has improved, but kidney function has decreased.   Stop Fenofibrate 160 mg daily.   Stressed importance of dietary modifications. Follow a low cholesterol, low saturated fat diet with less that 200mg of cholesterol a day.  Avoid fried foods and high saturated fats (high saturated fats less than 7% of calories).  Add Flax Seed/Fish Oil supplements to diet. Increase dietary fiber.  Regular exercise can reduce LDL and raise HDL. Stressed importance of physical activity 5 times per week for 30 minutes per day.

## 2023-12-18 NOTE — ASSESSMENT & PLAN NOTE
Kidney function has  since starting Fenofibrate 160 mg daily.   Stop Fenofibrate 160 mg daily.   Stop Lasix 20 mg daily.   Recheck CMP in one week.     Lab Results   Component Value Date    EGFRNORACEVR 40 2023    EGFRNORACEVR >60 2023     Follow renoprotective measures including Renal Diet (reduce intake of nuts, peanut butter, milk, cheese, dried beans, peas) and Low Sodium Diet (less than 2 grams per day).  Avoid NSAIDs (Aleve, Mobic, Celebrex, Ibuprofen, Advil, Toradol and Diclofenac). May take Tylenol as needed for headache/pain.  LDL goal < 100.  Stay well hydrated. Avoid alcohol and soda. Limit tea and coffee.

## 2023-12-18 NOTE — PROGRESS NOTES
Patient ID: [id number]     Chief Complaint: Follow-up (3 month check up) and Hyperlipidemia      HPI:     Kayla Wall is a 73 y.o. female here today for a hyperlipidemia follow up.     Past Medical History:   Diagnosis Date    Anxiety     Asthma     Basal cell carcinoma (BCC)     Bursitis     Carpal tunnel syndrome, bilateral upper limbs     Elevated diaphragm     Fatty liver     GERD (gastroesophageal reflux disease)     Gout     H. pylori infection     HLD (hyperlipidemia)     HTN (hypertension)     Hypothyroidism     Impingement syndrome of right shoulder     ASHLEY (obstructive sleep apnea)     Osteoarthritis     Primary osteoarthritis of right shoulder     SOB (shortness of breath)         Past Surgical History:   Procedure Laterality Date    APPENDECTOMY  1964    BASAL CELL CARCINOMA EXCISION Left     nose - Dr Kenney Benitez    BASAL CELL CARCINOMA EXCISION Right 2023    nose - Dr Kenney Benitez    CARDIAC CATHETERIZATION  2021    Dr Yobani Serrano    CARPAL TUNNEL RELEASE Left 2022    Dr Epifanio Ivy    CARPAL TUNNEL RELEASE Right     Dr Petey Saenz     SECTION  1976     SECTION  1971     SECTION  1968    CHOLECYSTECTOMY  2016    Dr Darnell Reich    COLONOSCOPY N/A 8/3/2023    Procedure: COLONOSCOPY;  Surgeon: Low Jimenez MD;  Location: [facility];  Service: Gastroenterology;  Laterality: N/A;    DISSECTION OF NECK      HYSTERECTOMY  1978    MYELOGRAPHY  10/22/1999    PLANTAR FASCIA RELEASE Left 2019    ROTATOR CUFF REPAIR Right 2017    Dr Kassy Hernadez    SHOULDER ARTHROSCOPY W/ SUBACROMIAL DECOMPRESSION AND DISTAL CLAVICLE EXCISION Right 2017    Dr Kassy Hernadez    TONSILLECTOMY  1958        Social History     Socioeconomic History    Marital status:    Tobacco Use    Smoking status: Never    Smokeless tobacco: Never   Substance and Sexual Activity    Alcohol use: Not Currently    Drug use: Never    Sexual  activity: Not Currently     Social Determinants of Health     Financial Resource Strain: Low Risk  (9/21/2023)    Overall Financial Resource Strain (CARDIA)     Difficulty of Paying Living Expenses: Not hard at all   Food Insecurity: No Food Insecurity (9/21/2023)    Hunger Vital Sign     Worried About Running Out of Food in the Last Year: Never true     Ran Out of Food in the Last Year: Never true   Transportation Needs: No Transportation Needs (9/21/2023)    PRAPARE - Transportation     Lack of Transportation (Medical): No     Lack of Transportation (Non-Medical): No   Physical Activity: Insufficiently Active (9/21/2023)    Exercise Vital Sign     Days of Exercise per Week: 6 days     Minutes of Exercise per Session: 20 min   Stress: Stress Concern Present (9/21/2023)    [employer] - Occupational Stress Questionnaire     Feeling of Stress : To some extent   Social Connections: Socially Integrated (9/21/2023)    Social Connection and Isolation Panel [NHANES]     Frequency of Communication with Friends and Family: More than three times a week     Frequency of Social Gatherings with Friends and Family: More than three times a week     Attends Yazidi Services: More than 4 times per year     Active Member of Clubs or Organizations: Yes     Attends Club or Organization Meetings: More than 4 times per year     Marital Status:    Housing Stability: Low Risk  (9/21/2023)    Housing Stability Vital Sign     Unable to Pay for Housing in the Last Year: No     Number of Places Lived in the Last Year: 1     Unstable Housing in the Last Year: No        Current Outpatient Medications   Medication Instructions    albuterol (VENTOLIN HFA) 90 mcg/actuation inhaler 2 puffs, Inhalation, Every 6 hours PRN, Rescue    allopurinoL (ZYLOPRIM) 100 mg, Oral, 3 times daily    amLODIPine (NORVASC) 2.5 mg, Oral, Daily    bisoprolol (ZEBETA) 5 mg, Oral, Daily    calcium carbonate (OS-YAHAIRA) 600 mg, Oral,  Daily    cholecalciferol (vitamin D3) (VITAMIN D3) 2,000 Units, Oral, Daily    doxepin (SINEQUAN) 10 MG capsule TAKE 1 CAPSULE BY MOUTH EVERY DAY    DULoxetine (CYMBALTA) 30 mg, Oral, Daily    DULoxetine (CYMBALTA) 60 mg, Oral, Daily    fluorouraciL (EFUDEX) 0.5 g, Topical (Top), Nightly PRN    fluticasone furoate-vilanteroL (BREO ELLIPTA) 100-25 mcg/dose diskus inhaler 1 puff, Inhalation, Daily, Controller    levothyroxine (SYNTHROID) 25 mcg, Oral    LORazepam (ATIVAN) 1 mg, Oral, Nightly    montelukast (SINGULAIR) 10 mg tablet TAKE 1 TABLET BY MOUTH EVERY DAY IN THE EVENING       Review of patient's allergies indicates:   Allergen Reactions    Adhesive tape-silicones      Other reaction(s): blisters skin    Adhesive Rash     Other reaction(s): blisters skin        Patient Care Team:  Anthony Leslie DO as PCP - General (Family Medicine)  Yobani Serrano MD as Consulting Physician (Cardiology)  Nedra Rushing MD (Dermatology)  Mona Pedraza FNP (Endocrinology)  Anali Braun FNP (Endocrinology)  Hola Barrios MD as Consulting Physician (Orthopedic Surgery)  Low Jimenez MD as Consulting Physician (Gastroenterology)  Kenney Benitez MD as Consulting Physician (Dermatology)  Nedra Castillo MD as Consulting Physician (Rheumatology)  Robert Mendez MD as Consulting Physician (Otolaryngology)     Subjective:     Review of Systems    12 point review of systems conducted, negative except as stated in the history of present illness. See HPI for details.    Objective:     Visit Vitals  /85 (BP Location: Left arm, Patient Position: Sitting, BP Method: Large (Automatic))   Pulse 66   Temp 98.3 °F (36.8 °C)   Resp 20   Ht 5' (1.524 m)   Wt 78.6 kg (173 lb 3.2 oz)   SpO2 98%   BMI 33.83 kg/m²       Physical Exam  Vitals and nursing note reviewed.   Constitutional:       General: She is not in acute distress.     Appearance: She is not ill-appearing.   HENT:      Head: Normocephalic and  atraumatic.      Mouth/Throat:      Mouth: Mucous membranes are moist.      Pharynx: Oropharynx is clear.   Eyes:      General: No scleral icterus.     Extraocular Movements: Extraocular movements intact.      Conjunctiva/sclera: Conjunctivae normal.      Pupils: Pupils are equal, round, and reactive to light.   Neck:      Vascular: No carotid bruit.   Cardiovascular:      Rate and Rhythm: Normal rate and regular rhythm.      Heart sounds: No murmur heard.     No friction rub. No gallop.   Pulmonary:      Effort: Pulmonary effort is normal. No respiratory distress.      Breath sounds: Normal breath sounds. No wheezing, rhonchi or rales.   Abdominal:      General: Abdomen is flat. Bowel sounds are normal. There is no distension.      Palpations: Abdomen is soft. There is no mass.      Tenderness: There is no abdominal tenderness.   Musculoskeletal:         General: Normal range of motion.      Cervical back: Normal range of motion and neck supple.   Skin:     General: Skin is warm and dry.   Neurological:      General: No focal deficit present.      Mental Status: She is alert.   Psychiatric:         Mood and Affect: Mood normal.         Labs Reviewed:     Chemistry:  Lab Results   Component Value Date     12/13/2023    K 4.0 12/13/2023    CHLORIDE 105 12/13/2023    BUN 19.0 12/13/2023    CREATININE 1.40 (H) 12/13/2023    EGFRNORACEVR 40 12/13/2023    GLUCOSE 110 12/13/2023    CALCIUM 9.7 12/13/2023    ALKPHOS 52 12/13/2023    LABPROT 7.5 12/13/2023    ALBUMIN 4.2 12/13/2023    BILIDIR 0.3 04/27/2022    IBILI 0.60 04/27/2022    AST 33 12/13/2023    ALT 41 12/13/2023    MG 1.9 10/28/2019    TSH 3.139 09/18/2023        Lab Results   Component Value Date    HGBA1C 5.8 06/17/2021        Hematology:  Lab Results   Component Value Date    WBC 7.70 09/18/2023    HGB 14.5 09/18/2023    HCT 42.4 09/18/2023     09/18/2023       Lipid Panel:  Lab Results   Component Value Date    CHOL 239 (H) 12/13/2023    HDL 47  2023    .00 (H) 2023    TRIG 150 (H) 2023    TOTALCHOLEST 5 2023        Urine:  Lab Results   Component Value Date    COLORUA Yellow 2022    APPEARANCEUA Clear 2022    SGUA <=1.005 2022    PHUA 6.0 2022    PROTEINUA Negative 2022    GLUCOSEUA Negative 2022    KETONESUA Negative 2022    BLOODUA Trace (A) 2022    NITRITESUA Negative 2022    LEUKOCYTESUR Negative 2022    RBCUA 3-5 2022    WBCUA 0-2 2022    BACTERIA Rare 2022        Assessment:       ICD-10-CM ICD-9-CM   1. Mixed hyperlipidemia  E78.2 272.2   2. Decreased GFR  R94.4 794.4        Plan:     1. Mixed hyperlipidemia  Assessment & Plan:  Lab Results   Component Value Date    .00 (H) 2023    TRIG 150 (H) 2023    HDL 47 2023    TOTALCHOLEST 5 2023     Cholesterol has improved, but kidney function has decreased.   Stop Fenofibrate 160 mg daily.   Stressed importance of dietary modifications. Follow a low cholesterol, low saturated fat diet with less that 200mg of cholesterol a day.  Avoid fried foods and high saturated fats (high saturated fats less than 7% of calories).  Add Flax Seed/Fish Oil supplements to diet. Increase dietary fiber.  Regular exercise can reduce LDL and raise HDL. Stressed importance of physical activity 5 times per week for 30 minutes per day.       Orders:  -     Discontinue: rosuvastatin (CRESTOR) 20 MG tablet; Take 1 tablet (20 mg total) by mouth once daily.  Dispense: 90 tablet; Refill: 3    2. Decreased GFR  Assessment & Plan:  Kidney function has  since starting Fenofibrate 160 mg daily.   Stop Fenofibrate 160 mg daily.   Stop Lasix 20 mg daily.   Recheck CMP in one week.     Lab Results   Component Value Date    EGFRNORACEVR 40 2023    EGFRNORACEVR >60 2023     Follow renoprotective measures including Renal Diet (reduce intake of nuts, peanut butter, milk, cheese, dried  beans, peas) and Low Sodium Diet (less than 2 grams per day).  Avoid NSAIDs (Aleve, Mobic, Celebrex, Ibuprofen, Advil, Toradol and Diclofenac). May take Tylenol as needed for headache/pain.  LDL goal < 100.  Stay well hydrated. Avoid alcohol and soda. Limit tea and coffee.        Orders:  -     CBC Auto Differential; Future; Expected date: 01/18/2024  -     Comprehensive Metabolic Panel; Future; Expected date: 01/18/2024  -     Comprehensive Metabolic Panel; Future; Expected date: 12/26/2023       Follow up in about 1 month (around 1/18/2024) for Kidney Function . In addition to their scheduled follow up, the patient has also been instructed to follow up on as needed basis.     Moo James NP

## 2023-12-19 ENCOUNTER — TELEPHONE (OUTPATIENT)
Dept: FAMILY MEDICINE | Facility: CLINIC | Age: 73
End: 2023-12-19
Payer: MEDICARE

## 2023-12-19 NOTE — TELEPHONE ENCOUNTER
"Physical Therapy Treatment    Patient Name:  Temitope Suero   MRN:  [id number]    Recommendations:     Discharge Recommendations:  nursing facility, skilled   Discharge Equipment Recommendations:  (TBD)   Barriers to discharge: Increased level of assist    Assessment:     Temitope Suero is a 80 y.o. female admitted with a medical diagnosis of Toxic encephalopathy. Patient with impaired performance this date compared to previous PT session per chart review. Patient became tearful while sitting edge of bed after sit to stand trial and deferred further activity, returned self to supine. Unable to attempt gait trial this date due to significant level of assist required to remain standing.     She presents with the following impairments/functional limitations: weakness, impaired endurance, impaired self care skills, impaired functional mobilty, gait instability, impaired balance, impaired cognition, decreased safety awareness, decreased coordination. Temitope Suero would continue to benefit from acute PT intervention to improve quality of life and focus on recovery of impairments. Once medically stable, recommending pt discharge to nursing facility, skilled.    Rehab Prognosis: Fair; patient continues to benefit from acute skilled PT services to address these deficits and reach maximum level of function.  Recent Surgery: * No surgery found *      Plan:     During this hospitalization, patient to be seen 3 x/week to address the identified rehab impairments via gait training, therapeutic activities, therapeutic exercises, neuromuscular re-education and progress toward the following goals:    · Plan of Care Expires:  06/09/22    Subjective     Chief Complaint: None verbalized   Patient/Family Comments/Goals: "Thank you for everything you're done" (tearfully)  Pain/Comfort:  · Pain Rating 1: 0/10    Objective:     Communicated with RN prior to session. Patient found HOB elevated with bed alarm, blood pressure cuff, telemetry, pulse " ----- Message from Moo James NP sent at 12/19/2023  8:36 AM CST -----  Please call the patient and let her know to discontinue Lasix 20 mg for right now. I put in a CMP for 12/26/23 to recheck kidney function to make sure it's not worsening. Please stress to get this done on Tuesday morning.      ox (continuous), peripheral IV ([employer]vasys camera) upon PT entry to room.     General Precautions: Standard, fall, pureed diet   Orthopedic Precautions:N/A   Braces: N/A    Functional Mobility:  · Bed Mobility:     · Rolling Right: maximal assistance  · Scooting: maximal assistance  · Supine to Sit: maximal assistance  · Sit to Supine: contact guard assistance, patient returns self to supine  · Transfers:     · Sit to Stand: maximal assistance with hand-held assist  · Gait: Deferred due to poor standing balance   · Balance:   · Static Sitting: Good, able to maintain for 8 minute(s) with stand by assistance however intermittent assist required to remain sitting as patient attempts to return self to supine  · Dynamic Sitting: not assessed this visit  · Static Standing: Poor, able to maintain for 1 minute(s) with maximal assistance, significant verbal and tactile cuing for upright posture, demonstrates left lean  · Dynamic Standing: not assessed this visit    AM-PAC 6 CLICK MOBILITY  Turning over in bed (including adjusting bedclothes, sheets and blankets)?: 2  Sitting down on and standing up from a chair with arms (e.g., wheelchair, bedside commode, etc.): 2  Moving from lying on back to sitting on the side of the bed?: 2  Moving to and from a bed to a chair (including a wheelchair)?: 1  Need to walk in hospital room?: 1  Climbing 3-5 steps with a railing?: 1  Basic Mobility Total Score: 9     Therapeutic Activities and Exercises:  Patient educated on role of acute care PT and PT POC  Whiteboard updated, Patient is not clear to transfer with RN/PCT   Significant time spent providing therapeutic listening when patient became tearful sitting at edge of bed, patient appears grateful to therapist for providing care, however unable to elaborate further. RN notified about patient becoming tearful during session    Patient left HOB elevated with all lines intact, call button in reach, RN notified and bed alarm on.    GOALS:    Multidisciplinary Problems     Physical Therapy Goals        Problem: Physical Therapy    Goal Priority Disciplines Outcome Goal Variances Interventions   Physical Therapy Goal     PT, PT/OT Ongoing, Progressing     Description: Goals to be met by: 2022     Patient will increase functional independence with mobility by performin. Supine to sit with Stand-by Assistance  2. Sit to supine with Stand-by Assistance  3. Sit to stand transfer with Stand-by Assistance  4. Bed to chair transfer with Stand-by Assistance using Rolling Walker  5. Gait  x 150 feet with Stand-by Assistance using Rolling Walker.   6. Lower extremity exercise program x15 reps per handout, with supervision                     Time Tracking:     PT Received On: 22  PT Start Time: 1310     PT Stop Time: 1335  PT Total Time (min): 25 min     Billable Minutes: Therapeutic Activity 23 min     Treatment Type: Treatment  PT/PTA: PT     PTA Visit Number: 0     2022

## 2023-12-21 RX ORDER — LORAZEPAM 0.5 MG/1
1 TABLET ORAL NIGHTLY
Qty: 60 TABLET | Refills: 0 | Status: SHIPPED | OUTPATIENT
Start: 2023-12-21 | End: 2024-02-12

## 2023-12-22 ENCOUNTER — TELEPHONE (OUTPATIENT)
Dept: FAMILY MEDICINE | Facility: CLINIC | Age: 73
End: 2023-12-22

## 2023-12-22 NOTE — TELEPHONE ENCOUNTER
----- Message from Winifred Miller sent at 12/22/2023  9:36 AM CST -----  Regarding: call back  Please give Mrs Garza a call back its about her gout meds and how her kidney functions have decreased and she was taking off some meds that could affect her kidneys more and she read that the med she is taking for her gout could also affect her kidneys [phone]      Thanks

## 2023-12-26 ENCOUNTER — TELEPHONE (OUTPATIENT)
Dept: FAMILY MEDICINE | Facility: CLINIC | Age: 73
End: 2023-12-26
Payer: MEDICARE

## 2023-12-26 ENCOUNTER — LAB VISIT (OUTPATIENT)
Dept: LAB | Facility: HOSPITAL | Age: 73
End: 2023-12-26
Payer: MEDICARE

## 2023-12-26 DIAGNOSIS — R94.4 DECREASED GFR: ICD-10-CM

## 2023-12-26 LAB
ALBUMIN SERPL-MCNC: 4.2 G/DL (ref 3.4–4.8)
ALBUMIN/GLOB SERPL: 1.2 RATIO (ref 1.1–2)
ALP SERPL-CCNC: 57 UNIT/L (ref 40–150)
ALT SERPL-CCNC: 43 UNIT/L (ref 0–55)
AST SERPL-CCNC: 35 UNIT/L (ref 5–34)
BILIRUB SERPL-MCNC: 0.4 MG/DL
BUN SERPL-MCNC: 18 MG/DL (ref 9.8–20.1)
CALCIUM SERPL-MCNC: 10.2 MG/DL (ref 8.4–10.2)
CHLORIDE SERPL-SCNC: 106 MMOL/L (ref 98–107)
CO2 SERPL-SCNC: 25 MMOL/L (ref 23–31)
CREAT SERPL-MCNC: 1.17 MG/DL (ref 0.55–1.02)
GFR SERPLBLD CREATININE-BSD FMLA CKD-EPI: 49 MLS/MIN/1.73/M2
GLOBULIN SER-MCNC: 3.5 GM/DL (ref 2.4–3.5)
GLUCOSE SERPL-MCNC: 155 MG/DL (ref 82–115)
POTASSIUM SERPL-SCNC: 4.4 MMOL/L (ref 3.5–5.1)
PROT SERPL-MCNC: 7.7 GM/DL (ref 5.8–7.6)
SODIUM SERPL-SCNC: 141 MMOL/L (ref 136–145)

## 2023-12-26 PROCEDURE — 80053 COMPREHEN METABOLIC PANEL: CPT

## 2023-12-26 PROCEDURE — 36415 COLL VENOUS BLD VENIPUNCTURE: CPT

## 2023-12-26 NOTE — TELEPHONE ENCOUNTER
----- Message from Moo James NP sent at 12/26/2023 12:18 PM CST -----  Please inform patient of lab results.     1. Kidney function is improving. Don't take Lasix or Fenofibrate for now. Keep scheduled appointment in January. Notify the clinic if you gain 3 or more pounds in one day or 5 or more pounds in one week. Please stress importance of daily morning weights after urination but prior to breakfast on the same scale. Report to ER for chest pain, SOB, difficulty breathing, abdominal distention or significant edema to lower extremities.    Thanks for all you do,   Moo

## 2024-01-03 ENCOUNTER — OFFICE VISIT (OUTPATIENT)
Dept: FAMILY MEDICINE | Facility: CLINIC | Age: 74
End: 2024-01-03
Payer: MEDICARE

## 2024-01-03 VITALS
TEMPERATURE: 97 F | DIASTOLIC BLOOD PRESSURE: 80 MMHG | RESPIRATION RATE: 20 BRPM | OXYGEN SATURATION: 96 % | HEIGHT: 60 IN | HEART RATE: 72 BPM | BODY MASS INDEX: 34.26 KG/M2 | WEIGHT: 174.5 LBS | SYSTOLIC BLOOD PRESSURE: 138 MMHG

## 2024-01-03 DIAGNOSIS — S50.311A ABRASION OF RIGHT ELBOW, INITIAL ENCOUNTER: ICD-10-CM

## 2024-01-03 DIAGNOSIS — R94.4 DECREASED GFR: ICD-10-CM

## 2024-01-03 DIAGNOSIS — S41.011A LACERATION OF RIGHT SHOULDER, INITIAL ENCOUNTER: Primary | ICD-10-CM

## 2024-01-03 DIAGNOSIS — S80.211A ABRASION OF RIGHT KNEE, INITIAL ENCOUNTER: ICD-10-CM

## 2024-01-03 PROCEDURE — 90715 TDAP VACCINE 7 YRS/> IM: CPT | Mod: ,,,

## 2024-01-03 PROCEDURE — 90471 IMMUNIZATION ADMIN: CPT | Mod: ,,,

## 2024-01-03 PROCEDURE — 99214 OFFICE O/P EST MOD 30 MIN: CPT | Mod: 25,,,

## 2024-01-03 RX ORDER — MUPIROCIN 20 MG/G
OINTMENT TOPICAL 3 TIMES DAILY
Qty: 1 G | Refills: 2 | Status: SHIPPED | OUTPATIENT
Start: 2024-01-03 | End: 2024-01-13

## 2024-01-03 RX ORDER — FOAM BANDAGE 4" X 4"
1 BANDAGE TOPICAL 2 TIMES DAILY
Qty: 30 EACH | Refills: 0 | Status: SHIPPED | OUTPATIENT
Start: 2024-01-03 | End: 2024-01-18

## 2024-01-03 NOTE — PROGRESS NOTES
Patient ID: [id number]     Chief Complaint: Fall (Fell yesterday has wound to right knee/Requesting tetnus)      HPI:     Kayla Wall is a 73 y.o. female here today for a problem visit. The patient fell yesterday while checking on the status of the floors in her rent house. She stepped on a wood board and the board slid from underneath Ms. Garza and she fell. She has lacerations on her right knee, right elbow, and right shoulder. Pain today is 4/10. Patient is taking Tylenol arthritis OTC for pain relief.   Past Medical History:   Diagnosis Date    Anxiety     Asthma     Basal cell carcinoma (BCC)     Bursitis     Carpal tunnel syndrome, bilateral upper limbs     Elevated diaphragm     Fatty liver     GERD (gastroesophageal reflux disease)     Gout     H. pylori infection     HLD (hyperlipidemia)     HTN (hypertension)     Hypothyroidism     Impingement syndrome of right shoulder     ASHLEY (obstructive sleep apnea)     Osteoarthritis     Primary osteoarthritis of right shoulder     SOB (shortness of breath)         Past Surgical History:   Procedure Laterality Date    APPENDECTOMY  1964    BASAL CELL CARCINOMA EXCISION Left     nose - Dr Kenney Benitez    BASAL CELL CARCINOMA EXCISION Right 2023    nose - Dr Kenney Benitez    CARDIAC CATHETERIZATION  2021    Dr Yobani Serrano    CARPAL TUNNEL RELEASE Left 2022    Dr Epifanio Ivy    CARPAL TUNNEL RELEASE Right     Dr Petey Saenz     SECTION  1976     SECTION  1971     SECTION  1968    CHOLECYSTECTOMY  2016    Dr Darnell Reich    COLONOSCOPY N/A 8/3/2023    Procedure: COLONOSCOPY;  Surgeon: Low Jimenez MD;  Location: [facility];  Service: Gastroenterology;  Laterality: N/A;    DISSECTION OF NECK      HYSTERECTOMY  1978    MYELOGRAPHY  10/22/1999    PLANTAR FASCIA RELEASE Left 2019    ROTATOR CUFF REPAIR Right 2017    Dr Kassy Hernadez    SHOULDER ARTHROSCOPY W/ SUBACROMIAL DECOMPRESSION  AND DISTAL CLAVICLE EXCISION Right 06/08/2017    Dr Kassy Hernadez    TONSILLECTOMY  1958        Social History     Socioeconomic History    Marital status:    Tobacco Use    Smoking status: Never    Smokeless tobacco: Never   Substance and Sexual Activity    Alcohol use: Not Currently    Drug use: Never    Sexual activity: Not Currently     Social Determinants of Health     Financial Resource Strain: Low Risk  (9/21/2023)    Overall Financial Resource Strain (CARDIA)     Difficulty of Paying Living Expenses: Not hard at all   Food Insecurity: No Food Insecurity (9/21/2023)    Hunger Vital Sign     Worried About Running Out of Food in the Last Year: Never true     Ran Out of Food in the Last Year: Never true   Transportation Needs: No Transportation Needs (9/21/2023)    PRAPARE - Transportation     Lack of Transportation (Medical): No     Lack of Transportation (Non-Medical): No   Physical Activity: Insufficiently Active (9/21/2023)    Exercise Vital Sign     Days of Exercise per Week: 6 days     Minutes of Exercise per Session: 20 min   Stress: Stress Concern Present (9/21/2023)    [employer] [place] [employer] - Occupational Stress Questionnaire     Feeling of Stress : To some extent   Social Connections: Socially Integrated (9/21/2023)    Social Connection and Isolation Panel [NHANES]     Frequency of Communication with Friends and Family: More than three times a week     Frequency of Social Gatherings with Friends and Family: More than three times a week     Attends Christian Services: More than 4 times per year     Active Member of Clubs or Organizations: Yes     Attends Club or Organization Meetings: More than 4 times per year     Marital Status:    Housing Stability: Low Risk  (9/21/2023)    Housing Stability Vital Sign     Unable to Pay for Housing in the Last Year: No     Number of Places Lived in the Last Year: 1     Unstable Housing in the Last Year: No        Current  "Outpatient Medications   Medication Instructions    albuterol (VENTOLIN HFA) 90 mcg/actuation inhaler 2 puffs, Inhalation, Every 6 hours PRN, Rescue    allopurinoL (ZYLOPRIM) 100 mg, Oral, 3 times daily    amLODIPine (NORVASC) 2.5 mg, Oral, Daily    bisoprolol (ZEBETA) 5 mg, Oral, Daily    calcium carbonate (OS-YAHAIRA) 600 mg, Oral, Daily    cholecalciferol (vitamin D3) (VITAMIN D3) 2,000 Units, Oral, Daily    doxepin (SINEQUAN) 10 MG capsule TAKE 1 CAPSULE BY MOUTH EVERY DAY    DULoxetine (CYMBALTA) 30 mg, Oral, Daily    DULoxetine (CYMBALTA) 60 mg, Oral, Daily    fluorouraciL (EFUDEX) 0.5 g, Topical (Top), Nightly PRN    fluticasone furoate-vilanteroL (BREO ELLIPTA) 100-25 mcg/dose diskus inhaler 1 puff, Inhalation, Daily, Controller    foam bandage (MEPILEX) 4 X 4 " Bndg 1 Application, Topical (Top), 2 times daily    levothyroxine (SYNTHROID) 25 mcg, Oral    LORazepam (ATIVAN) 1 mg, Oral, Nightly    LORazepam (ATIVAN) 1 mg, Oral, Nightly    montelukast (SINGULAIR) 10 mg tablet TAKE 1 TABLET BY MOUTH EVERY DAY IN THE EVENING    mupirocin (BACTROBAN) 2 % ointment Topical (Top), 3 times daily       Review of patient's allergies indicates:   Allergen Reactions    Adhesive tape-silicones      Other reaction(s): blisters skin    Adhesive Rash     Other reaction(s): blisters skin        Patient Care Team:  Anthony Leslie DO as PCP - General (Family Medicine)  Yobani Serrano MD as Consulting Physician (Cardiology)  Nedra Rushing MD (Dermatology)  Mona Pedraza FNP (Endocrinology)  Anali Braun FNP (Endocrinology)  Hola Barrios MD as Consulting Physician (Orthopedic Surgery)  Low Jimenez MD as Consulting Physician (Gastroenterology)  Kenney Benitez MD as Consulting Physician (Dermatology)  Nedra Castillo MD as Consulting Physician (Rheumatology)  Robert Mendez MD as Consulting Physician (Otolaryngology)     Subjective:     Review of Systems    12 point review of systems conducted, " negative except as stated in the history of present illness. See HPI for details.    Objective:     Visit Vitals  /80 (BP Location: Left arm, Patient Position: Sitting, BP Method: Large (Automatic))   Pulse 72   Temp 97.3 °F (36.3 °C)   Resp 20   Ht 5' (1.524 m)   Wt 79.2 kg (174 lb 8 oz)   SpO2 96%   BMI 34.08 kg/m²       Physical Exam  Vitals and nursing note reviewed.   Constitutional:       General: She is not in acute distress.     Appearance: She is not ill-appearing.   HENT:      Head: Normocephalic and atraumatic.      Mouth/Throat:      Mouth: Mucous membranes are moist.      Pharynx: Oropharynx is clear.   Eyes:      General: No scleral icterus.     Extraocular Movements: Extraocular movements intact.      Conjunctiva/sclera: Conjunctivae normal.      Pupils: Pupils are equal, round, and reactive to light.   Neck:      Vascular: No carotid bruit.   Cardiovascular:      Rate and Rhythm: Normal rate and regular rhythm.      Heart sounds: No murmur heard.     No friction rub. No gallop.   Pulmonary:      Effort: Pulmonary effort is normal. No respiratory distress.      Breath sounds: Normal breath sounds. No wheezing, rhonchi or rales.   Abdominal:      General: Abdomen is flat. Bowel sounds are normal. There is no distension.      Palpations: Abdomen is soft. There is no mass.      Tenderness: There is no abdominal tenderness.   Musculoskeletal:         General: Normal range of motion.      Cervical back: Normal range of motion and neck supple.   Skin:     General: Skin is warm and dry.      Findings: Abrasion and laceration present.   Neurological:      General: No focal deficit present.      Mental Status: She is alert.   Psychiatric:         Mood and Affect: Mood normal.                   Labs Reviewed:     Chemistry:  Lab Results   Component Value Date     12/26/2023    K 4.4 12/26/2023    CHLORIDE 106 12/26/2023    BUN 18.0 12/26/2023    CREATININE 1.17 (H) 12/26/2023    EGFRNORACEVR 49  "12/26/2023    GLUCOSE 155 (H) 12/26/2023    CALCIUM 10.2 12/26/2023    ALKPHOS 57 12/26/2023    LABPROT 7.7 (H) 12/26/2023    ALBUMIN 4.2 12/26/2023    BILIDIR 0.3 04/27/2022    IBILI 0.60 04/27/2022    AST 35 (H) 12/26/2023    ALT 43 12/26/2023    MG 1.9 10/28/2019    TSH 3.139 09/18/2023        Lab Results   Component Value Date    HGBA1C 5.8 06/17/2021        Hematology:  Lab Results   Component Value Date    WBC 7.70 09/18/2023    HGB 14.5 09/18/2023    HCT 42.4 09/18/2023     09/18/2023       Lipid Panel:  Lab Results   Component Value Date    CHOL 239 (H) 12/13/2023    HDL 47 12/13/2023    .00 (H) 12/13/2023    TRIG 150 (H) 12/13/2023    TOTALCHOLEST 5 12/13/2023        Urine:  Lab Results   Component Value Date    COLORUA Yellow 12/28/2022    APPEARANCEUA Clear 12/28/2022    SGUA <=1.005 12/28/2022    PHUA 6.0 12/28/2022    PROTEINUA Negative 12/28/2022    GLUCOSEUA Negative 12/28/2022    KETONESUA Negative 12/28/2022    BLOODUA Trace (A) 12/28/2022    NITRITESUA Negative 12/28/2022    LEUKOCYTESUR Negative 12/28/2022    RBCUA 3-5 12/28/2022    WBCUA 0-2 12/28/2022    BACTERIA Rare 12/28/2022        Assessment:       ICD-10-CM ICD-9-CM   1. Laceration of right shoulder, initial encounter  S41.011A 880.00   2. Abrasion of right knee, initial encounter  S80.211A 916.0   3. Abrasion of right elbow, initial encounter  S50.311A 913.0   4. Decreased GFR  R94.4 794.4        Plan:     1. Laceration of right shoulder, initial encounter  Assessment & Plan:  Clean wound daily with soap and warm water.   Apply antibiotic ointment then cover the wound with sterile gauze.     RTC if symptoms worsen or if you start to experience fever of 100.4.     Orders:  -     foam bandage (MEPILEX) 4 X 4 " Bndg; Apply 1 Application topically 2 (two) times a day. for 14 days  Dispense: 30 each; Refill: 0  -     mupirocin (BACTROBAN) 2 % ointment; Apply topically 3 (three) times daily. for 10 days  Dispense: 1 g; Refill: " 2  -     (In Office Administered) Tdap Vaccine    2. Abrasion of right knee, initial encounter  -     mupirocin (BACTROBAN) 2 % ointment; Apply topically 3 (three) times daily. for 10 days  Dispense: 1 g; Refill: 2    3. Abrasion of right elbow, initial encounter  -     mupirocin (BACTROBAN) 2 % ointment; Apply topically 3 (three) times daily. for 10 days  Dispense: 1 g; Refill: 2    4. Decreased GFR  Assessment & Plan:  Recommended patient take Tylenol Arthritis OTC for pain relief.   Avoid NSAIDs (Aleve, Mobic, Celebrex, Ibuprofen, Advil, Toradol and Diclofenac).             Follow up for Keep Scheduled Appointment.. In addition to their scheduled follow up, the patient has also been instructed to follow up on as needed basis.     Future Appointments   Date Time Provider Department Center   1/16/2024  9:40 AM Moo James NP [facility] [address] [facility]        Moo James NP

## 2024-01-03 NOTE — ASSESSMENT & PLAN NOTE
Recommended patient take Tylenol Arthritis OTC for pain relief.   Avoid NSAIDs (Aleve, Mobic, Celebrex, Ibuprofen, Advil, Toradol and Diclofenac).

## 2024-01-03 NOTE — ASSESSMENT & PLAN NOTE
Clean wound daily with soap and warm water.   Apply antibiotic ointment then cover the wound with sterile gauze.     RTC if symptoms worsen or if you start to experience fever of 100.4.

## 2024-01-17 ENCOUNTER — LAB VISIT (OUTPATIENT)
Dept: LAB | Facility: HOSPITAL | Age: 74
End: 2024-01-17
Payer: MEDICARE

## 2024-01-17 DIAGNOSIS — R94.4 DECREASED GFR: ICD-10-CM

## 2024-01-17 LAB
ALBUMIN SERPL-MCNC: 4.1 G/DL (ref 3.4–4.8)
ALBUMIN/GLOB SERPL: 1.1 RATIO (ref 1.1–2)
ALP SERPL-CCNC: 74 UNIT/L (ref 40–150)
ALT SERPL-CCNC: 41 UNIT/L (ref 0–55)
AST SERPL-CCNC: 26 UNIT/L (ref 5–34)
BASOPHILS # BLD AUTO: 0.05 X10(3)/MCL
BASOPHILS NFR BLD AUTO: 0.6 %
BILIRUB SERPL-MCNC: 0.4 MG/DL
BUN SERPL-MCNC: 16 MG/DL (ref 9.8–20.1)
CALCIUM SERPL-MCNC: 9.9 MG/DL (ref 8.4–10.2)
CHLORIDE SERPL-SCNC: 105 MMOL/L (ref 98–107)
CO2 SERPL-SCNC: 26 MMOL/L (ref 23–31)
CREAT SERPL-MCNC: 0.94 MG/DL (ref 0.55–1.02)
EOSINOPHIL # BLD AUTO: 0.11 X10(3)/MCL (ref 0–0.9)
EOSINOPHIL NFR BLD AUTO: 1.2 %
ERYTHROCYTE [DISTWIDTH] IN BLOOD BY AUTOMATED COUNT: 13.4 % (ref 11.5–17)
GFR SERPLBLD CREATININE-BSD FMLA CKD-EPI: >60 MLS/MIN/1.73/M2
GLOBULIN SER-MCNC: 3.6 GM/DL (ref 2.4–3.5)
GLUCOSE SERPL-MCNC: 115 MG/DL (ref 82–115)
HCT VFR BLD AUTO: 43.1 % (ref 37–47)
HGB BLD-MCNC: 14.3 G/DL (ref 12–16)
IMM GRANULOCYTES # BLD AUTO: 0.04 X10(3)/MCL (ref 0–0.04)
IMM GRANULOCYTES NFR BLD AUTO: 0.5 %
LYMPHOCYTES # BLD AUTO: 3.99 X10(3)/MCL (ref 0.6–4.6)
LYMPHOCYTES NFR BLD AUTO: 45.2 %
MCH RBC QN AUTO: 31.4 PG (ref 27–31)
MCHC RBC AUTO-ENTMCNC: 33.2 G/DL (ref 33–36)
MCV RBC AUTO: 94.5 FL (ref 80–94)
MONOCYTES # BLD AUTO: 0.62 X10(3)/MCL (ref 0.1–1.3)
MONOCYTES NFR BLD AUTO: 7 %
NEUTROPHILS # BLD AUTO: 4.02 X10(3)/MCL (ref 2.1–9.2)
NEUTROPHILS NFR BLD AUTO: 45.5 %
NRBC BLD AUTO-RTO: 0 %
PLATELET # BLD AUTO: 274 X10(3)/MCL (ref 130–400)
PMV BLD AUTO: 11.1 FL (ref 7.4–10.4)
POTASSIUM SERPL-SCNC: 4 MMOL/L (ref 3.5–5.1)
PROT SERPL-MCNC: 7.7 GM/DL (ref 5.8–7.6)
RBC # BLD AUTO: 4.56 X10(6)/MCL (ref 4.2–5.4)
SODIUM SERPL-SCNC: 140 MMOL/L (ref 136–145)
WBC # SPEC AUTO: 8.83 X10(3)/MCL (ref 4.5–11.5)

## 2024-01-17 PROCEDURE — 80053 COMPREHEN METABOLIC PANEL: CPT

## 2024-01-17 PROCEDURE — 36415 COLL VENOUS BLD VENIPUNCTURE: CPT

## 2024-01-17 PROCEDURE — 85025 COMPLETE CBC W/AUTO DIFF WBC: CPT

## 2024-01-18 ENCOUNTER — OFFICE VISIT (OUTPATIENT)
Dept: FAMILY MEDICINE | Facility: CLINIC | Age: 74
End: 2024-01-18
Payer: MEDICARE

## 2024-01-18 VITALS
RESPIRATION RATE: 24 BRPM | WEIGHT: 172.81 LBS | HEART RATE: 72 BPM | TEMPERATURE: 98 F | OXYGEN SATURATION: 95 % | DIASTOLIC BLOOD PRESSURE: 68 MMHG | BODY MASS INDEX: 33.93 KG/M2 | HEIGHT: 60 IN | SYSTOLIC BLOOD PRESSURE: 132 MMHG

## 2024-01-18 DIAGNOSIS — N17.9 AKI (ACUTE KIDNEY INJURY): ICD-10-CM

## 2024-01-18 DIAGNOSIS — E78.2 MIXED HYPERLIPIDEMIA: Primary | Chronic | ICD-10-CM

## 2024-01-18 PROCEDURE — 99214 OFFICE O/P EST MOD 30 MIN: CPT | Mod: ,,,

## 2024-01-18 RX ORDER — ROSUVASTATIN CALCIUM 20 MG/1
20 TABLET, COATED ORAL DAILY
Qty: 90 TABLET | Refills: 3 | Status: SHIPPED | OUTPATIENT
Start: 2024-01-18 | End: 2025-01-17

## 2024-01-18 RX ORDER — FENOFIBRATE 54 MG/1
54 TABLET ORAL DAILY
Qty: 90 TABLET | Refills: 3 | Status: SHIPPED | OUTPATIENT
Start: 2024-01-18 | End: 2024-05-20

## 2024-01-18 NOTE — ASSESSMENT & PLAN NOTE
Restart Fenofibrate 54 mg + Rosuvastatin 20 mg daily.   Recheck CMP + Lipid panel in one month.     Lab Results   Component Value Date    .00 (H) 12/13/2023    TRIG 150 (H) 12/13/2023    HDL 47 12/13/2023    TOTALCHOLEST 5 12/13/2023     Stressed importance of dietary modifications. Follow a low cholesterol, low saturated fat diet with less that 200mg of cholesterol a day.  Avoid fried foods and high saturated fats (high saturated fats less than 7% of calories).  Add Flax Seed/Fish Oil supplements to diet. Increase dietary fiber.  Regular exercise can reduce LDL and raise HDL. Stressed importance of physical activity 5 times per week for 30 minutes per day.

## 2024-01-18 NOTE — PROGRESS NOTES
Patient ID: [id number]     Chief Complaint: Follow-up (1 month follow up for kidney function and lab results)      HPI:     Kayla Wall is a 73 y.o. female here today for a CRISTOPHER follow up and to receive test results. The patient's fenofibrate and lasix were temporarily discontinued. She denies any urinary retention or oliguria. No other complaints today.     Past Medical History:   Diagnosis Date    Anxiety     Asthma     Basal cell carcinoma (BCC)     Bursitis     Carpal tunnel syndrome, bilateral upper limbs     Elevated diaphragm     Fatty liver     GERD (gastroesophageal reflux disease)     Gout     H. pylori infection     HLD (hyperlipidemia)     HTN (hypertension)     Hypothyroidism     Impingement syndrome of right shoulder     ASHLEY (obstructive sleep apnea)     Osteoarthritis     Primary osteoarthritis of right shoulder     SOB (shortness of breath)         Past Surgical History:   Procedure Laterality Date    APPENDECTOMY  1964    BASAL CELL CARCINOMA EXCISION Left     nose - Dr Kenney Benitez    BASAL CELL CARCINOMA EXCISION Right 2023    nose - Dr Kenney Benitez    CARDIAC CATHETERIZATION  2021    Dr Yobani Serrano    CARPAL TUNNEL RELEASE Left 2022    Dr Epifanio Ivy    CARPAL TUNNEL RELEASE Right 2017    Dr Petey Saenz     SECTION  1976     SECTION  1971     SECTION  1968    CHOLECYSTECTOMY  2016    Dr Darnell Reich    COLONOSCOPY N/A 8/3/2023    Procedure: COLONOSCOPY;  Surgeon: Low Jimenez MD;  Location: [facility];  Service: Gastroenterology;  Laterality: N/A;    DISSECTION OF NECK      HYSTERECTOMY      MYELOGRAPHY  10/22/1999    PLANTAR FASCIA RELEASE Left 2019    ROTATOR CUFF REPAIR Right 2017    Dr Kassy Hernadez    SHOULDER ARTHROSCOPY W/ SUBACROMIAL DECOMPRESSION AND DISTAL CLAVICLE EXCISION Right 2017    Dr Kassy Hernadez    TONSILLECTOMY          Social History     Socioeconomic History    Marital  status:    Tobacco Use    Smoking status: Never    Smokeless tobacco: Never   Substance and Sexual Activity    Alcohol use: Not Currently    Drug use: Never    Sexual activity: Not Currently     Social Determinants of Health     Financial Resource Strain: Low Risk  (9/21/2023)    Overall Financial Resource Strain (CARDIA)     Difficulty of Paying Living Expenses: Not hard at all   Food Insecurity: No Food Insecurity (9/21/2023)    Hunger Vital Sign     Worried About Running Out of Food in the Last Year: Never true     Ran Out of Food in the Last Year: Never true   Transportation Needs: No Transportation Needs (9/21/2023)    PRAPARE - Transportation     Lack of Transportation (Medical): No     Lack of Transportation (Non-Medical): No   Physical Activity: Insufficiently Active (9/21/2023)    Exercise Vital Sign     Days of Exercise per Week: 6 days     Minutes of Exercise per Session: 20 min   Stress: Stress Concern Present (9/21/2023)    [employer] [place] [employer] - Occupational Stress Questionnaire     Feeling of Stress : To some extent   Social Connections: Socially Integrated (9/21/2023)    Social Connection and Isolation Panel [NHANES]     Frequency of Communication with Friends and Family: More than three times a week     Frequency of Social Gatherings with Friends and Family: More than three times a week     Attends Christian Services: More than 4 times per year     Active Member of Clubs or Organizations: Yes     Attends Club or Organization Meetings: More than 4 times per year     Marital Status:    Housing Stability: Low Risk  (9/21/2023)    Housing Stability Vital Sign     Unable to Pay for Housing in the Last Year: No     Number of Places Lived in the Last Year: 1     Unstable Housing in the Last Year: No        Current Outpatient Medications   Medication Instructions    albuterol (VENTOLIN HFA) 90 mcg/actuation inhaler 2 puffs, Inhalation, Every 6 hours PRN, Rescue     allopurinoL (ZYLOPRIM) 100 mg, Oral, 3 times daily    amLODIPine (NORVASC) 2.5 mg, Oral, Daily    bisoprolol (ZEBETA) 5 mg, Oral, Daily    calcium carbonate (OS-YAHAIRA) 600 mg, Oral, Daily    cholecalciferol (vitamin D3) (VITAMIN D3) 2,000 Units, Oral, Daily    doxepin (SINEQUAN) 10 MG capsule TAKE 1 CAPSULE BY MOUTH EVERY DAY    DULoxetine (CYMBALTA) 30 mg, Oral, Daily    DULoxetine (CYMBALTA) 60 mg, Oral, Daily    fenofibrate (TRICOR) 54 mg, Oral, Daily    fluorouraciL (EFUDEX) 0.5 g, Topical (Top), Nightly PRN    fluticasone furoate-vilanteroL (BREO ELLIPTA) 100-25 mcg/dose diskus inhaler 1 puff, Inhalation, Daily, Controller    levothyroxine (SYNTHROID) 25 mcg, Oral    LORazepam (ATIVAN) 1 mg, Oral, Nightly    montelukast (SINGULAIR) 10 mg tablet TAKE 1 TABLET BY MOUTH EVERY DAY IN THE EVENING    rosuvastatin (CRESTOR) 20 mg, Oral, Daily       Review of patient's allergies indicates:   Allergen Reactions    Adhesive tape-silicones      Other reaction(s): blisters skin    Adhesive Rash     Other reaction(s): blisters skin        Patient Care Team:  Anthony Leslie DO as PCP - General (Family Medicine)  Yobani Serrano MD as Consulting Physician (Cardiology)  Nedra Rushing MD (Dermatology)  Mona Pedraza FNP (Endocrinology)  Anali Braun FNP (Endocrinology)  Hola Barrios MD as Consulting Physician (Orthopedic Surgery)  Low Jimenez MD as Consulting Physician (Gastroenterology)  Kenney Benitez MD as Consulting Physician (Dermatology)  Nedra Castillo MD as Consulting Physician (Rheumatology)  Robert Mendez MD as Consulting Physician (Otolaryngology)     Subjective:     Review of Systems    12 point review of systems conducted, negative except as stated in the history of present illness. See HPI for details.    Objective:     Visit Vitals  /68 (BP Location: Left arm, Patient Position: Sitting, BP Method: Medium (Manual))   Pulse 72   Temp 98.1 °F (36.7 °C)   Resp (!) 24   Ht  5' (1.524 m)   Wt 78.4 kg (172 lb 12.8 oz)   SpO2 95%   BMI 33.75 kg/m²       Physical Exam  Vitals and nursing note reviewed.   Constitutional:       General: She is not in acute distress.     Appearance: She is not ill-appearing.   HENT:      Head: Normocephalic and atraumatic.      Mouth/Throat:      Mouth: Mucous membranes are moist.      Pharynx: Oropharynx is clear.   Eyes:      General: No scleral icterus.     Extraocular Movements: Extraocular movements intact.      Conjunctiva/sclera: Conjunctivae normal.      Pupils: Pupils are equal, round, and reactive to light.   Neck:      Vascular: No carotid bruit.   Cardiovascular:      Rate and Rhythm: Normal rate and regular rhythm.      Heart sounds: No murmur heard.     No friction rub. No gallop.   Pulmonary:      Effort: Pulmonary effort is normal. No respiratory distress.      Breath sounds: Normal breath sounds. No wheezing, rhonchi or rales.   Abdominal:      General: Abdomen is flat. Bowel sounds are normal. There is no distension.      Palpations: Abdomen is soft. There is no mass.      Tenderness: There is no abdominal tenderness.   Musculoskeletal:         General: Normal range of motion.      Cervical back: Normal range of motion and neck supple.      Right lower leg: No edema.      Left lower leg: No edema.   Skin:     General: Skin is warm and dry.   Neurological:      General: No focal deficit present.      Mental Status: She is alert.   Psychiatric:         Mood and Affect: Mood normal.         Labs Reviewed:     Chemistry:  Lab Results   Component Value Date     01/17/2024    K 4.0 01/17/2024    CHLORIDE 105 01/17/2024    BUN 16.0 01/17/2024    CREATININE 0.94 01/17/2024    EGFRNORACEVR >60 01/17/2024    GLUCOSE 115 01/17/2024    CALCIUM 9.9 01/17/2024    ALKPHOS 74 01/17/2024    LABPROT 7.7 (H) 01/17/2024    ALBUMIN 4.1 01/17/2024    BILIDIR 0.3 04/27/2022    IBILI 0.60 04/27/2022    AST 26 01/17/2024    ALT 41 01/17/2024    MG 1.9  10/28/2019    TSH 3.139 09/18/2023        Lab Results   Component Value Date    HGBA1C 5.8 06/17/2021        Hematology:  Lab Results   Component Value Date    WBC 8.83 01/17/2024    HGB 14.3 01/17/2024    HCT 43.1 01/17/2024     01/17/2024       Lipid Panel:  Lab Results   Component Value Date    CHOL 239 (H) 12/13/2023    HDL 47 12/13/2023    .00 (H) 12/13/2023    TRIG 150 (H) 12/13/2023    TOTALCHOLEST 5 12/13/2023        Urine:  Lab Results   Component Value Date    COLORUA Yellow 12/28/2022    APPEARANCEUA Clear 12/28/2022    SGUA <=1.005 12/28/2022    PHUA 6.0 12/28/2022    PROTEINUA Negative 12/28/2022    GLUCOSEUA Negative 12/28/2022    KETONESUA Negative 12/28/2022    BLOODUA Trace (A) 12/28/2022    NITRITESUA Negative 12/28/2022    LEUKOCYTESUR Negative 12/28/2022    RBCUA 3-5 12/28/2022    WBCUA 0-2 12/28/2022    BACTERIA Rare 12/28/2022        Assessment:       ICD-10-CM ICD-9-CM   1. Mixed hyperlipidemia  E78.2 272.2   2. CRISTOPHER (acute kidney injury)  N17.9 584.9        Plan:     1. Mixed hyperlipidemia  Assessment & Plan:  Restart Fenofibrate 54 mg + Rosuvastatin 20 mg daily.   Recheck CMP + Lipid panel in one month.     Lab Results   Component Value Date    .00 (H) 12/13/2023    TRIG 150 (H) 12/13/2023    HDL 47 12/13/2023    TOTALCHOLEST 5 12/13/2023     Stressed importance of dietary modifications. Follow a low cholesterol, low saturated fat diet with less that 200mg of cholesterol a day.  Avoid fried foods and high saturated fats (high saturated fats less than 7% of calories).  Add Flax Seed/Fish Oil supplements to diet. Increase dietary fiber.  Regular exercise can reduce LDL and raise HDL. Stressed importance of physical activity 5 times per week for 30 minutes per day.         Orders:  -     fenofibrate (TRICOR) 54 MG tablet; Take 1 tablet (54 mg total) by mouth once daily.  Dispense: 90 tablet; Refill: 3  -     rosuvastatin (CRESTOR) 20 MG tablet; Take 1 tablet (20 mg total)  by mouth once daily.  Dispense: 90 tablet; Refill: 3  -     Comprehensive Metabolic Panel; Future; Expected date: 02/19/2024  -     Lipid Panel; Future; Expected date: 02/19/2024    2. CRISTOPHER (acute kidney injury)  Assessment & Plan:  Patient's kidney function has improved and has returned to baseline.   Recheck CMP in one month.     Orders:  -     Comprehensive Metabolic Panel; Future; Expected date: 02/19/2024       Follow up in about 4 months (around 5/18/2024) for Cholesterol and Kidney Function . In addition to their scheduled follow up, the patient has also been instructed to follow up on as needed basis.     Future Appointments   Date Time Provider Department Center   5/20/2024 10:00 AM Moo James NP [facility] [address] [facility]        Moo James NP

## 2024-02-01 RX ORDER — MECLIZINE HYDROCHLORIDE 25 MG/1
25 TABLET ORAL 3 TIMES DAILY PRN
Qty: 90 TABLET | Refills: 2 | Status: SHIPPED | OUTPATIENT
Start: 2024-02-01

## 2024-02-01 NOTE — TELEPHONE ENCOUNTER
Patient requesting meclizine to be refilled. Stated she had an old script from Dr Hernandez but is now out. Do you want to refill?

## 2024-02-12 RX ORDER — LORAZEPAM 0.5 MG/1
1 TABLET ORAL NIGHTLY
Qty: 60 TABLET | Refills: 0 | Status: SHIPPED | OUTPATIENT
Start: 2024-02-12 | End: 2024-04-15

## 2024-02-15 ENCOUNTER — TELEPHONE (OUTPATIENT)
Dept: FAMILY MEDICINE | Facility: CLINIC | Age: 74
End: 2024-02-15
Payer: MEDICARE

## 2024-02-15 ENCOUNTER — LAB VISIT (OUTPATIENT)
Dept: LAB | Facility: HOSPITAL | Age: 74
End: 2024-02-15
Payer: MEDICARE

## 2024-02-15 DIAGNOSIS — N17.9 AKI (ACUTE KIDNEY INJURY): ICD-10-CM

## 2024-02-15 DIAGNOSIS — E78.2 MIXED HYPERLIPIDEMIA: Chronic | ICD-10-CM

## 2024-02-15 LAB
ALBUMIN SERPL-MCNC: 4.2 G/DL (ref 3.4–4.8)
ALBUMIN/GLOB SERPL: 1.3 RATIO (ref 1.1–2)
ALP SERPL-CCNC: 66 UNIT/L (ref 40–150)
ALT SERPL-CCNC: 29 UNIT/L (ref 0–55)
AST SERPL-CCNC: 22 UNIT/L (ref 5–34)
BILIRUB SERPL-MCNC: 0.6 MG/DL
BUN SERPL-MCNC: 15 MG/DL (ref 9.8–20.1)
CALCIUM SERPL-MCNC: 10.2 MG/DL (ref 8.4–10.2)
CHLORIDE SERPL-SCNC: 105 MMOL/L (ref 98–107)
CHOLEST SERPL-MCNC: 178 MG/DL
CHOLEST/HDLC SERPL: 3 {RATIO} (ref 0–5)
CO2 SERPL-SCNC: 30 MMOL/L (ref 23–31)
CREAT SERPL-MCNC: 1.09 MG/DL (ref 0.55–1.02)
GFR SERPLBLD CREATININE-BSD FMLA CKD-EPI: 53 MLS/MIN/1.73/M2
GLOBULIN SER-MCNC: 3.2 GM/DL (ref 2.4–3.5)
GLUCOSE SERPL-MCNC: 110 MG/DL (ref 82–115)
HDLC SERPL-MCNC: 55 MG/DL (ref 35–60)
LDLC SERPL CALC-MCNC: 97 MG/DL (ref 50–140)
POTASSIUM SERPL-SCNC: 4.4 MMOL/L (ref 3.5–5.1)
PROT SERPL-MCNC: 7.4 GM/DL (ref 5.8–7.6)
SODIUM SERPL-SCNC: 142 MMOL/L (ref 136–145)
TRIGL SERPL-MCNC: 129 MG/DL (ref 37–140)
VLDLC SERPL CALC-MCNC: 26 MG/DL

## 2024-02-15 PROCEDURE — 80053 COMPREHEN METABOLIC PANEL: CPT

## 2024-02-15 PROCEDURE — 80061 LIPID PANEL: CPT

## 2024-02-15 PROCEDURE — 36415 COLL VENOUS BLD VENIPUNCTURE: CPT

## 2024-02-15 NOTE — TELEPHONE ENCOUNTER
Tried to contact company back, no answer.   Odomzo Counseling- I discussed with the patient the risks of Odomzo including but not limited to nausea, vomiting, diarrhea, constipation, weight loss, changes in the sense of taste, decreased appetite, muscle spasms, and hair loss.  The patient verbalized understanding of the proper use and possible adverse effects of Odomzo.  All of the patient's questions and concerns were addressed.

## 2024-02-15 NOTE — TELEPHONE ENCOUNTER
----- Message from Leonora Dennison LPN sent at 2/15/2024  9:30 AM CST -----  Regarding: FW: med advice    ----- Message -----  From: Anni Chamberlain  Sent: 2/14/2024  11:34 AM CST  To: Anuj Cruz Staff  Subject: med advice                                       .Type:  Needs Medical Advice    Who Called:[facility]  Symptoms (please be specific):   How long has patient had these symptoms:    Pharmacy name and phone #:    Would the patient rather a call back or a response via [facility]? Call back  Best Call Back Number: [phone]  Additional Information: Following up on paperwork that was faxed over to be completed by provider.

## 2024-02-19 ENCOUNTER — TELEPHONE (OUTPATIENT)
Dept: FAMILY MEDICINE | Facility: CLINIC | Age: 74
End: 2024-02-19
Payer: MEDICARE

## 2024-02-19 DIAGNOSIS — E78.2 MIXED HYPERLIPIDEMIA: Primary | ICD-10-CM

## 2024-02-19 NOTE — TELEPHONE ENCOUNTER
----- Message from Moo James NP sent at 2/15/2024 11:37 AM CST -----  Please inform patient of lab results.     1. Kidney function has slightly decreased after restarting cholesterol medications again, but is stable. Patient can stop taking Fenofibrate. Please put in CMP and Lipid prior to her next appointment in May.     2. Cholesterol has improved as well.     Thanks for all you do,   Moo

## 2024-03-13 DIAGNOSIS — E03.9 HYPOTHYROIDISM, UNSPECIFIED TYPE: ICD-10-CM

## 2024-03-13 DIAGNOSIS — F41.9 ANXIETY: ICD-10-CM

## 2024-03-13 RX ORDER — LEVOTHYROXINE SODIUM 25 UG/1
25 TABLET ORAL
Qty: 90 TABLET | Refills: 1 | Status: SHIPPED | OUTPATIENT
Start: 2024-03-13

## 2024-03-13 RX ORDER — DOXEPIN HYDROCHLORIDE 10 MG/1
CAPSULE ORAL
Qty: 90 CAPSULE | Refills: 1 | Status: SHIPPED | OUTPATIENT
Start: 2024-03-13

## 2024-03-14 ENCOUNTER — TELEPHONE (OUTPATIENT)
Dept: FAMILY MEDICINE | Facility: CLINIC | Age: 74
End: 2024-03-14
Payer: MEDICARE

## 2024-03-14 ENCOUNTER — LAB VISIT (OUTPATIENT)
Dept: LAB | Facility: HOSPITAL | Age: 74
End: 2024-03-14
Payer: MEDICARE

## 2024-03-14 DIAGNOSIS — E78.2 MIXED HYPERLIPIDEMIA: ICD-10-CM

## 2024-03-14 LAB
ALBUMIN SERPL-MCNC: 4.1 G/DL (ref 3.4–4.8)
ALBUMIN/GLOB SERPL: 1.1 RATIO (ref 1.1–2)
ALP SERPL-CCNC: 81 UNIT/L (ref 40–150)
ALT SERPL-CCNC: 32 UNIT/L (ref 0–55)
AST SERPL-CCNC: 25 UNIT/L (ref 5–34)
BILIRUB SERPL-MCNC: 0.5 MG/DL
BUN SERPL-MCNC: 16 MG/DL (ref 9.8–20.1)
CALCIUM SERPL-MCNC: 10 MG/DL (ref 8.4–10.2)
CHLORIDE SERPL-SCNC: 107 MMOL/L (ref 98–107)
CHOLEST SERPL-MCNC: 170 MG/DL
CHOLEST/HDLC SERPL: 3 {RATIO} (ref 0–5)
CO2 SERPL-SCNC: 23 MMOL/L (ref 23–31)
CREAT SERPL-MCNC: 0.87 MG/DL (ref 0.55–1.02)
GFR SERPLBLD CREATININE-BSD FMLA CKD-EPI: >60 MLS/MIN/1.73/M2
GLOBULIN SER-MCNC: 3.6 GM/DL (ref 2.4–3.5)
GLUCOSE SERPL-MCNC: 117 MG/DL (ref 82–115)
HDLC SERPL-MCNC: 58 MG/DL (ref 35–60)
LDLC SERPL CALC-MCNC: 87 MG/DL (ref 50–140)
POTASSIUM SERPL-SCNC: 4.7 MMOL/L (ref 3.5–5.1)
PROT SERPL-MCNC: 7.7 GM/DL (ref 5.8–7.6)
SODIUM SERPL-SCNC: 140 MMOL/L (ref 136–145)
TRIGL SERPL-MCNC: 127 MG/DL (ref 37–140)
VLDLC SERPL CALC-MCNC: 25 MG/DL

## 2024-03-14 PROCEDURE — 80061 LIPID PANEL: CPT

## 2024-03-14 PROCEDURE — 80053 COMPREHEN METABOLIC PANEL: CPT

## 2024-03-14 PROCEDURE — 36415 COLL VENOUS BLD VENIPUNCTURE: CPT

## 2024-03-14 NOTE — TELEPHONE ENCOUNTER
----- Message from Moo James NP sent at 3/14/2024  3:47 PM CDT -----  Kidney function back within normal ranges. Cholesterol within normal ranges. Continue current treatment.

## 2024-04-01 ENCOUNTER — OFFICE VISIT (OUTPATIENT)
Dept: FAMILY MEDICINE | Facility: CLINIC | Age: 74
End: 2024-04-01
Payer: MEDICARE

## 2024-04-01 VITALS
HEART RATE: 71 BPM | BODY MASS INDEX: 35.41 KG/M2 | OXYGEN SATURATION: 95 % | TEMPERATURE: 98 F | RESPIRATION RATE: 18 BRPM | HEIGHT: 60 IN | SYSTOLIC BLOOD PRESSURE: 155 MMHG | DIASTOLIC BLOOD PRESSURE: 65 MMHG | WEIGHT: 180.38 LBS

## 2024-04-01 DIAGNOSIS — G47.33 OSA (OBSTRUCTIVE SLEEP APNEA): Chronic | ICD-10-CM

## 2024-04-01 DIAGNOSIS — R10.811 RIGHT UPPER QUADRANT ABDOMINAL TENDERNESS, REBOUND TENDERNESS PRESENCE NOT SPECIFIED: ICD-10-CM

## 2024-04-01 DIAGNOSIS — R06.02 SOB (SHORTNESS OF BREATH): ICD-10-CM

## 2024-04-01 DIAGNOSIS — R14.0 ABDOMINAL DISTENTION: Primary | ICD-10-CM

## 2024-04-01 PROBLEM — N17.9 AKI (ACUTE KIDNEY INJURY): Status: RESOLVED | Noted: 2023-12-18 | Resolved: 2024-04-01

## 2024-04-01 PROCEDURE — 99214 OFFICE O/P EST MOD 30 MIN: CPT | Mod: ,,,

## 2024-04-01 RX ORDER — FUROSEMIDE 20 MG/1
20 TABLET ORAL DAILY
Qty: 4 TABLET | Refills: 0 | Status: SHIPPED | OUTPATIENT
Start: 2024-04-01 | End: 2024-05-20

## 2024-04-01 NOTE — PROGRESS NOTES
Patient ID: [id number]     Chief Complaint: bloated stomach      HPI:   Kayla Wall, a 73-year-old woman, has come in today for a specific concern. She has been dealing with a bloated stomach for several weeks and has noticed an increase in belching. However, she states that her bowel movements have remained consistent with no changes in frequency or consistency. She has a daily bowel movement. Additionally, she has been experiencing increased shortness of breath. Kayla mentions that she struggles to make her bed in the morning without feeling breathless. She was previously prescribed Lasix 20 mg daily but had to discontinue it due to an CRISTOPHER.     Past Medical History:   Diagnosis Date    Anxiety     Asthma     Basal cell carcinoma (BCC)     Bursitis     Carpal tunnel syndrome, bilateral upper limbs     Elevated diaphragm     Fatty liver     GERD (gastroesophageal reflux disease)     Gout     H. pylori infection     HLD (hyperlipidemia)     HTN (hypertension)     Hypothyroidism     Impingement syndrome of right shoulder     ASHLEY (obstructive sleep apnea)     Osteoarthritis     Primary osteoarthritis of right shoulder     SOB (shortness of breath)         Past Surgical History:   Procedure Laterality Date    APPENDECTOMY  1964    BASAL CELL CARCINOMA EXCISION Left     nose - Dr Kenney Benitez    BASAL CELL CARCINOMA EXCISION Right 2023    nose - Dr Kenney Benitez    CARDIAC CATHETERIZATION  2021    Dr Yobani Serrano    CARPAL TUNNEL RELEASE Left 2022    Dr Epifanio Ivy    CARPAL TUNNEL RELEASE Right     Dr Petey Saenz     SECTION  1976     SECTION  1971     SECTION  1968    CHOLECYSTECTOMY  2016    Dr Darnell Reich    COLONOSCOPY N/A 8/3/2023    Procedure: COLONOSCOPY;  Surgeon: Low Jimenez MD;  Location: [facility];  Service: Gastroenterology;  Laterality: N/A;    DISSECTION OF NECK      HYSTERECTOMY      MYELOGRAPHY  10/22/1999    PLANTAR  FASCIA RELEASE Left 2019    ROTATOR CUFF REPAIR Right 06/08/2017    Dr Kassy Hernadez    SHOULDER ARTHROSCOPY W/ SUBACROMIAL DECOMPRESSION AND DISTAL CLAVICLE EXCISION Right 06/08/2017    Dr Kassy Hernadez    TONSILLECTOMY  1958        Social History     Socioeconomic History    Marital status:    Tobacco Use    Smoking status: Never    Smokeless tobacco: Never   Substance and Sexual Activity    Alcohol use: Not Currently    Drug use: Never    Sexual activity: Not Currently     Social Determinants of Health     Financial Resource Strain: Low Risk  (9/21/2023)    Overall Financial Resource Strain (CARDIA)     Difficulty of Paying Living Expenses: Not hard at all   Food Insecurity: No Food Insecurity (9/21/2023)    Hunger Vital Sign     Worried About Running Out of Food in the Last Year: Never true     Ran Out of Food in the Last Year: Never true   Transportation Needs: No Transportation Needs (9/21/2023)    PRAPARE - Transportation     Lack of Transportation (Medical): No     Lack of Transportation (Non-Medical): No   Physical Activity: Insufficiently Active (9/21/2023)    Exercise Vital Sign     Days of Exercise per Week: 6 days     Minutes of Exercise per Session: 20 min   Stress: Stress Concern Present (9/21/2023)    [employer] - Occupational Stress Questionnaire     Feeling of Stress : To some extent   Social Connections: Socially Integrated (9/21/2023)    Social Connection and Isolation Panel [NHANES]     Frequency of Communication with Friends and Family: More than three times a week     Frequency of Social Gatherings with Friends and Family: More than three times a week     Attends [employer] Services: More than 4 times per year     Active Member of Clubs or Organizations: Yes     Attends Club or Organization Meetings: More than 4 times per year     Marital Status:    Housing Stability: Low Risk  (9/21/2023)    Housing Stability Vital Sign     Unable to  Pay for Housing in the Last Year: No     Number of Places Lived in the Last Year: 1     Unstable Housing in the Last Year: No        Current Outpatient Medications   Medication Instructions    albuterol (VENTOLIN HFA) 90 mcg/actuation inhaler 2 puffs, Inhalation, Every 6 hours PRN, Rescue    allopurinoL (ZYLOPRIM) 100 mg, Oral, 3 times daily    amLODIPine (NORVASC) 2.5 mg, Oral, Daily    bisoprolol (ZEBETA) 5 mg, Oral, Daily    calcium carbonate (OS-YAHAIRA) 600 mg, Oral, Daily    cholecalciferol (vitamin D3) (VITAMIN D3) 2,000 Units, Oral, Daily    doxepin (SINEQUAN) 10 MG capsule TAKE 1 CAPSULE BY MOUTH EVERY DAY    DULoxetine (CYMBALTA) 30 mg, Oral, Daily    DULoxetine (CYMBALTA) 60 mg, Oral, Daily    fenofibrate (TRICOR) 54 mg, Oral, Daily    fluorouraciL (EFUDEX) 0.5 g, Topical (Top), Nightly PRN    fluticasone furoate-vilanteroL (BREO ELLIPTA) 100-25 mcg/dose diskus inhaler 1 puff, Inhalation, Daily, Controller    furosemide (LASIX) 20 mg, Oral, Daily    levothyroxine (SYNTHROID) 25 mcg, Oral    LORazepam (ATIVAN) 1 mg, Oral, Nightly    meclizine (ANTIVERT) 25 mg, Oral, 3 times daily PRN    montelukast (SINGULAIR) 10 mg tablet TAKE 1 TABLET BY MOUTH EVERY DAY IN THE EVENING    rosuvastatin (CRESTOR) 20 mg, Oral, Daily       Review of patient's allergies indicates:   Allergen Reactions    Adhesive tape-silicones      Other reaction(s): blisters skin    Adhesive Rash     Other reaction(s): blisters skin        Patient Care Team:  Anthony Leslie DO as PCP - General (Family Medicine)  Yobani Serrano MD as Consulting Physician (Cardiology)  Nedra Rushing MD (Dermatology)  Mona Pedraza FNP (Endocrinology)  Anali Braun FNP (Endocrinology)  Hola Barrios MD as Consulting Physician (Orthopedic Surgery)  Low Jimenez MD as Consulting Physician (Gastroenterology)  Kenney Benitez MD as Consulting Physician (Dermatology)  Nedra Castillo MD as Consulting Physician (Rheumatology)  Andrea  Robert SANTOS MD as Consulting Physician (Otolaryngology)     Subjective:     Review of Systems    12 point review of systems conducted, negative except as stated in the history of present illness. See HPI for details.    Objective:     Visit Vitals  BP (!) 155/65 (BP Location: Right arm, Patient Position: Sitting, BP Method: Large (Automatic))   Pulse 71   Temp 98.1 °F (36.7 °C)   Resp 18   Ht 5' (1.524 m)   Wt 81.8 kg (180 lb 6.4 oz)   SpO2 95%   BMI 35.23 kg/m²       Physical Exam  Vitals and nursing note reviewed.   Constitutional:       General: She is not in acute distress.     Appearance: She is not ill-appearing.   HENT:      Head: Normocephalic and atraumatic.      Mouth/Throat:      Mouth: Mucous membranes are moist.      Pharynx: Oropharynx is clear.   Eyes:      General: No scleral icterus.     Extraocular Movements: Extraocular movements intact.      Conjunctiva/sclera: Conjunctivae normal.      Pupils: Pupils are equal, round, and reactive to light.   Neck:      Vascular: No carotid bruit.   Cardiovascular:      Rate and Rhythm: Normal rate and regular rhythm.      Heart sounds: No murmur heard.     No friction rub. No gallop.   Pulmonary:      Effort: Pulmonary effort is normal. No respiratory distress.      Breath sounds: Normal breath sounds. No wheezing, rhonchi or rales.   Abdominal:      General: Abdomen is protuberant. Bowel sounds are normal. There is distension.      Palpations: Abdomen is soft. There is no mass.      Tenderness: There is abdominal tenderness in the right upper quadrant.   Musculoskeletal:         General: Normal range of motion.      Cervical back: Normal range of motion and neck supple.   Skin:     General: Skin is warm and dry.   Neurological:      General: No focal deficit present.      Mental Status: She is alert.   Psychiatric:         Mood and Affect: Mood normal.         Labs Reviewed:     Chemistry:  Lab Results   Component Value Date     03/14/2024    K 4.7  03/14/2024    CHLORIDE 107 03/14/2024    BUN 16.0 03/14/2024    CREATININE 0.87 03/14/2024    EGFRNORACEVR >60 03/14/2024    GLUCOSE 117 (H) 03/14/2024    CALCIUM 10.0 03/14/2024    ALKPHOS 81 03/14/2024    LABPROT 7.7 (H) 03/14/2024    ALBUMIN 4.1 03/14/2024    BILIDIR 0.3 04/27/2022    IBILI 0.60 04/27/2022    AST 25 03/14/2024    ALT 32 03/14/2024    MG 1.9 10/28/2019    TSH 3.139 09/18/2023        Lab Results   Component Value Date    HGBA1C 5.8 06/17/2021        Hematology:  Lab Results   Component Value Date    WBC 8.83 01/17/2024    HGB 14.3 01/17/2024    HCT 43.1 01/17/2024     01/17/2024       Lipid Panel:  Lab Results   Component Value Date    CHOL 170 03/14/2024    HDL 58 03/14/2024    LDL 87.00 03/14/2024    TRIG 127 03/14/2024    TOTALCHOLEST 3 03/14/2024        Urine:  Lab Results   Component Value Date    COLORUA Yellow 12/28/2022    APPEARANCEUA Clear 12/28/2022    SGUA <=1.005 12/28/2022    PHUA 6.0 12/28/2022    PROTEINUA Negative 12/28/2022    GLUCOSEUA Negative 12/28/2022    KETONESUA Negative 12/28/2022    BLOODUA Trace (A) 12/28/2022    NITRITESUA Negative 12/28/2022    LEUKOCYTESUR Negative 12/28/2022    RBCUA 3-5 12/28/2022    WBCUA 0-2 12/28/2022    BACTERIA Rare 12/28/2022        Assessment:       ICD-10-CM ICD-9-CM   1. Abdominal distention  R14.0 787.3   2. Right upper quadrant abdominal tenderness, rebound tenderness presence not specified  R10.811 789.61   3. SOB (shortness of breath)  R06.02 786.05   4. ASHLEY (obstructive sleep apnea)  G47.33 327.23        Plan:     1. Abdominal distention  -     US Abdomen Complete; Future; Expected date: 04/01/2024    2. Right upper quadrant abdominal tenderness, rebound tenderness presence not specified  -     US Abdomen Complete; Future; Expected date: 04/01/2024  -     Comprehensive Metabolic Panel; Future; Expected date: 04/01/2024  -     Bilirubin, Direct; Future; Expected date: 04/01/2024    3. SOB (shortness of breath)  -     furosemide  (LASIX) 20 MG tablet; Take 1 tablet (20 mg total) by mouth once daily. for 4 days  Dispense: 4 tablet; Refill: 0    4. ASHLEY (obstructive sleep apnea)  Assessment & Plan:  The patient is compliant with CPAP.          Follow up for Keep Scheduled Appointment.. In addition to their scheduled follow up, the patient has also been instructed to follow up on as needed basis.     Future Appointments   Date Time Provider Department Center   4/8/2024 10:00 AM [facility] MAMMO1 SCREEN [facility] MAMMO [facility]   5/20/2024 10:00 AM Moo James NP [facility] [address] MD Moo James NP

## 2024-04-04 ENCOUNTER — HOSPITAL ENCOUNTER (OUTPATIENT)
Dept: RADIOLOGY | Facility: HOSPITAL | Age: 74
Discharge: HOME | End: 2024-04-04
Payer: MEDICARE

## 2024-04-04 DIAGNOSIS — R14.0 ABDOMINAL DISTENTION: ICD-10-CM

## 2024-04-04 DIAGNOSIS — R10.811 RIGHT UPPER QUADRANT ABDOMINAL TENDERNESS, REBOUND TENDERNESS PRESENCE NOT SPECIFIED: ICD-10-CM

## 2024-04-04 PROCEDURE — 76700 US EXAM ABDOM COMPLETE: CPT | Mod: TC

## 2024-04-08 ENCOUNTER — HOSPITAL ENCOUNTER (OUTPATIENT)
Dept: RADIOLOGY | Facility: HOSPITAL | Age: 74
Discharge: HOME | End: 2024-04-08
Attending: OBSTETRICS & GYNECOLOGY
Payer: MEDICARE

## 2024-04-08 DIAGNOSIS — Z12.31 ENCOUNTER FOR SCREENING MAMMOGRAM FOR MALIGNANT NEOPLASM OF BREAST: ICD-10-CM

## 2024-04-08 PROCEDURE — 77067 SCR MAMMO BI INCL CAD: CPT | Mod: TC

## 2024-04-08 PROCEDURE — 77067 SCR MAMMO BI INCL CAD: CPT | Mod: 26,,, | Performed by: STUDENT IN AN ORGANIZED HEALTH CARE EDUCATION/TRAINING PROGRAM

## 2024-04-08 PROCEDURE — 77063 BREAST TOMOSYNTHESIS BI: CPT | Mod: 26,,, | Performed by: STUDENT IN AN ORGANIZED HEALTH CARE EDUCATION/TRAINING PROGRAM

## 2024-04-09 ENCOUNTER — TELEPHONE (OUTPATIENT)
Dept: FAMILY MEDICINE | Facility: CLINIC | Age: 74
End: 2024-04-09
Payer: MEDICARE

## 2024-04-09 NOTE — TELEPHONE ENCOUNTER
Patient given results. Patient is unsure if lasix helped. She stated she is not taking it anymore but doesn't see a change and is unsure what she should be looking for. I asked if james swelling had improved with the medication and she said no.

## 2024-04-09 NOTE — TELEPHONE ENCOUNTER
----- Message from Moo James NP sent at 4/8/2024 11:19 AM CDT -----  All lab results within acceptable ranges.

## 2024-04-09 NOTE — TELEPHONE ENCOUNTER
----- Message from Moo James NP sent at 4/8/2024 11:20 AM CDT -----  US of ABD is negative.     Did the Lasix help?

## 2024-04-15 RX ORDER — LORAZEPAM 0.5 MG/1
1 TABLET ORAL NIGHTLY
Qty: 60 TABLET | Refills: 0 | Status: SHIPPED | OUTPATIENT
Start: 2024-04-15 | End: 2024-06-18

## 2024-04-23 ENCOUNTER — TELEPHONE (OUTPATIENT)
Dept: FAMILY MEDICINE | Facility: CLINIC | Age: 74
End: 2024-04-23
Payer: MEDICARE

## 2024-04-23 NOTE — TELEPHONE ENCOUNTER
Patient is calling to see if she needs to restart her lasix and fenofibrate since you had stopped them at her last visit? Please advise.

## 2024-04-30 DIAGNOSIS — M1A.0790 IDIOPATHIC CHRONIC GOUT OF FOOT WITHOUT TOPHUS, UNSPECIFIED LATERALITY: Chronic | ICD-10-CM

## 2024-04-30 RX ORDER — ALLOPURINOL 100 MG/1
100 TABLET ORAL 3 TIMES DAILY
Qty: 270 TABLET | Refills: 1 | Status: SHIPPED | OUTPATIENT
Start: 2024-04-30

## 2024-05-01 ENCOUNTER — TELEPHONE (OUTPATIENT)
Dept: FAMILY MEDICINE | Facility: CLINIC | Age: 74
End: 2024-05-01
Payer: MEDICARE

## 2024-05-01 ENCOUNTER — LAB VISIT (OUTPATIENT)
Dept: LAB | Facility: HOSPITAL | Age: 74
End: 2024-05-01
Attending: INTERNAL MEDICINE
Payer: MEDICARE

## 2024-05-01 DIAGNOSIS — I10 HYPERTENSION, UNSPECIFIED TYPE: ICD-10-CM

## 2024-05-01 DIAGNOSIS — Z79.899 ENCOUNTER FOR LONG-TERM (CURRENT) USE OF OTHER MEDICATIONS: Primary | ICD-10-CM

## 2024-05-01 DIAGNOSIS — E07.9 DISEASE OF THYROID GLAND: ICD-10-CM

## 2024-05-01 LAB
ANION GAP SERPL CALC-SCNC: 8 MEQ/L
BUN SERPL-MCNC: 14 MG/DL (ref 9.8–20.1)
CALCIUM SERPL-MCNC: 10 MG/DL (ref 8.4–10.2)
CHLORIDE SERPL-SCNC: 106 MMOL/L (ref 98–107)
CO2 SERPL-SCNC: 26 MMOL/L (ref 23–31)
CREAT SERPL-MCNC: 1.01 MG/DL (ref 0.55–1.02)
CREAT/UREA NIT SERPL: 14
GFR SERPLBLD CREATININE-BSD FMLA CKD-EPI: 59 MLS/MIN/1.73/M2
GLUCOSE SERPL-MCNC: 81 MG/DL (ref 82–115)
POTASSIUM SERPL-SCNC: 4.3 MMOL/L (ref 3.5–5.1)
SODIUM SERPL-SCNC: 140 MMOL/L (ref 136–145)
T3FREE SERPL-MCNC: 2.64 PG/ML (ref 1.58–3.91)
T4 FREE SERPL-MCNC: 0.93 NG/DL (ref 0.7–1.48)
T4 SERPL-MCNC: 7.63 UG/DL (ref 4.87–11.72)
TSH SERPL-ACNC: 2.47 UIU/ML (ref 0.35–4.94)

## 2024-05-01 PROCEDURE — 84439 ASSAY OF FREE THYROXINE: CPT

## 2024-05-01 PROCEDURE — 84481 FREE ASSAY (FT-3): CPT

## 2024-05-01 PROCEDURE — 80048 BASIC METABOLIC PNL TOTAL CA: CPT

## 2024-05-01 PROCEDURE — 36415 COLL VENOUS BLD VENIPUNCTURE: CPT

## 2024-05-01 PROCEDURE — 84443 ASSAY THYROID STIM HORMONE: CPT

## 2024-05-01 PROCEDURE — 84436 ASSAY OF TOTAL THYROXINE: CPT

## 2024-05-01 NOTE — TELEPHONE ENCOUNTER
----- Message from Winifred Miller sent at 5/1/2024 12:54 PM CDT -----  Regarding: thyroid blood test  Ms Garza called and left a message asking if she can have a blood test done to check her thyroid levels please check into this and get back with her [phone]      Thanks

## 2024-05-02 ENCOUNTER — TELEPHONE (OUTPATIENT)
Dept: FAMILY MEDICINE | Facility: CLINIC | Age: 74
End: 2024-05-02
Payer: MEDICARE

## 2024-05-02 NOTE — TELEPHONE ENCOUNTER
----- Message from Moo James NP sent at 5/2/2024  9:04 AM CDT -----  All lab results within acceptable ranges.

## 2024-05-07 ENCOUNTER — HOSPITAL ENCOUNTER (OUTPATIENT)
Dept: RADIOLOGY | Facility: HOSPITAL | Age: 74
Discharge: HOME | End: 2024-05-07
Attending: INTERNAL MEDICINE
Payer: MEDICARE

## 2024-05-07 DIAGNOSIS — I65.23 BILATERAL CAROTID ARTERY STENOSIS: ICD-10-CM

## 2024-05-07 PROCEDURE — 93880 EXTRACRANIAL BILAT STUDY: CPT | Mod: TC

## 2024-05-20 ENCOUNTER — HOSPITAL ENCOUNTER (OUTPATIENT)
Dept: RADIOLOGY | Facility: HOSPITAL | Age: 74
Discharge: HOME | End: 2024-05-20
Payer: MEDICARE

## 2024-05-20 ENCOUNTER — OFFICE VISIT (OUTPATIENT)
Dept: FAMILY MEDICINE | Facility: CLINIC | Age: 74
End: 2024-05-20
Payer: MEDICARE

## 2024-05-20 ENCOUNTER — DOCUMENTATION ONLY (OUTPATIENT)
Dept: FAMILY MEDICINE | Facility: CLINIC | Age: 74
End: 2024-05-20
Payer: MEDICARE

## 2024-05-20 VITALS
HEIGHT: 60 IN | TEMPERATURE: 98 F | BODY MASS INDEX: 36.32 KG/M2 | DIASTOLIC BLOOD PRESSURE: 69 MMHG | WEIGHT: 185 LBS | SYSTOLIC BLOOD PRESSURE: 150 MMHG | RESPIRATION RATE: 18 BRPM | OXYGEN SATURATION: 95 % | HEART RATE: 69 BPM

## 2024-05-20 DIAGNOSIS — R06.02 SOB (SHORTNESS OF BREATH): Primary | ICD-10-CM

## 2024-05-20 DIAGNOSIS — I10 PRIMARY HYPERTENSION: ICD-10-CM

## 2024-05-20 DIAGNOSIS — E78.2 MIXED HYPERLIPIDEMIA: Chronic | ICD-10-CM

## 2024-05-20 DIAGNOSIS — R14.0 ABDOMINAL DISTENTION: ICD-10-CM

## 2024-05-20 DIAGNOSIS — R06.02 SOB (SHORTNESS OF BREATH): ICD-10-CM

## 2024-05-20 PROBLEM — I65.23 BILATERAL CAROTID ARTERY STENOSIS: Status: ACTIVE | Noted: 2024-05-20

## 2024-05-20 PROBLEM — E66.01 SEVERE OBESITY (BMI 35.0-39.9) WITH COMORBIDITY: Status: ACTIVE | Noted: 2024-05-20

## 2024-05-20 PROCEDURE — 71046 X-RAY EXAM CHEST 2 VIEWS: CPT | Mod: TC

## 2024-05-20 PROCEDURE — 99214 OFFICE O/P EST MOD 30 MIN: CPT | Mod: ,,,

## 2024-05-20 RX ORDER — FUROSEMIDE 20 MG/1
20 TABLET ORAL DAILY
Qty: 30 TABLET | Refills: 0 | Status: SHIPPED | OUTPATIENT
Start: 2024-05-20 | End: 2024-06-19

## 2024-05-20 NOTE — ASSESSMENT & PLAN NOTE
Restart Lasix 20 mg daily.   Continue Bisoprololn 5 mg daily + Amlodipine 2.5 mg daily.   Low Sodium Diet (DASH Diet - Less than 2 grams of sodium per day).  Monitor blood pressure daily and log. Report consistent numbers greater than 140/90.  Maintain healthy weight with goal BMI <30. Exercise 30 minutes per day, 5 days per week.

## 2024-05-20 NOTE — ASSESSMENT & PLAN NOTE
Recommended the patient follow up with Dr. Jimenez for further evaluation. An appointment was made for the patient to follow up with them on 6/10/24.

## 2024-05-20 NOTE — PROGRESS NOTES
Patient ID: [id number]     Chief Complaint: 4 month f/u (Cholesterol, Kidney function )      HPI:     Kayla Wall, a 73-year-old woman, is here for a follow-up appointment. She is still experiencing shortness of breath and abdominal distention. Dr. Serrano informed her that her heart walls are thickening, so she would like us to request records for further evaluation. She has no changes in bowel movements but does have a consistent nagging pain in the RUQ. She mentions that simple tasks like making her bed or taking out the trash are becoming harder for her to complete. By the time she reaches the end of her driveway, she is out of breath. She has gained five pounds since her last visit. No other concerns reported today.    Past Medical History:   Diagnosis Date    Anxiety     Asthma     Basal cell carcinoma (BCC)     Bursitis     Carpal tunnel syndrome, bilateral upper limbs     Elevated diaphragm     Fatty liver     GERD (gastroesophageal reflux disease)     Gout     H. pylori infection     HLD (hyperlipidemia)     HTN (hypertension)     Hypothyroidism     Impingement syndrome of right shoulder     ASHLEY (obstructive sleep apnea)     Osteoarthritis     Primary osteoarthritis of right shoulder     SOB (shortness of breath)         Past Surgical History:   Procedure Laterality Date    APPENDECTOMY  1964    BASAL CELL CARCINOMA EXCISION Left     nose - Dr Kenney Benitez    BASAL CELL CARCINOMA EXCISION Right 2023    nose - Dr Kenney Benitez    CARDIAC CATHETERIZATION  2021    Dr Yobani Serrano    CARPAL TUNNEL RELEASE Left 2022    Dr Epifanio Ivy    CARPAL TUNNEL RELEASE Right     Dr Petey Saenz     SECTION  1976     SECTION  1971     SECTION  1968    CHOLECYSTECTOMY  2016    Dr Darnell Reich    COLONOSCOPY N/A 8/3/2023    Procedure: COLONOSCOPY;  Surgeon: Low Jimenez MD;  Location: [facility] [place] [place];  Service: Gastroenterology;  Laterality: N/A;    DISSECTION  OF NECK  1989    HYSTERECTOMY  1978    MYELOGRAPHY  10/22/1999    PLANTAR FASCIA RELEASE Left 2019    ROTATOR CUFF REPAIR Right 06/08/2017    Dr Kassy Hernadez    SHOULDER ARTHROSCOPY W/ SUBACROMIAL DECOMPRESSION AND DISTAL CLAVICLE EXCISION Right 06/08/2017    Dr Kassy Hernadez    TONSILLECTOMY  1958        Social History     Socioeconomic History    Marital status:    Tobacco Use    Smoking status: Never    Smokeless tobacco: Never   Substance and Sexual Activity    Alcohol use: Not Currently    Drug use: Never    Sexual activity: Not Currently     Social Determinants of Health     Financial Resource Strain: Low Risk  (9/21/2023)    Overall Financial Resource Strain (CARDIA)     Difficulty of Paying Living Expenses: Not hard at all   Food Insecurity: No Food Insecurity (9/21/2023)    Hunger Vital Sign     Worried About Running Out of Food in the Last Year: Never true     Ran Out of Food in the Last Year: Never true   Transportation Needs: No Transportation Needs (9/21/2023)    PRAPARE - Transportation     Lack of Transportation (Medical): No     Lack of Transportation (Non-Medical): No   Physical Activity: Insufficiently Active (9/21/2023)    Exercise Vital Sign     Days of Exercise per Week: 6 days     Minutes of Exercise per Session: 20 min   Stress: Stress Concern Present (9/21/2023)    [employer] - Occupational Stress Questionnaire     Feeling of Stress : To some extent   Housing Stability: Low Risk  (9/21/2023)    Housing Stability Vital Sign     Unable to Pay for Housing in the Last Year: No     Number of Places Lived in the Last Year: 1     Unstable Housing in the Last Year: No        Current Outpatient Medications   Medication Instructions    albuterol (VENTOLIN HFA) 90 mcg/actuation inhaler 2 puffs, Inhalation, Every 6 hours PRN, Rescue    allopurinoL (ZYLOPRIM) 100 mg, Oral, 3 times daily    amLODIPine (NORVASC) 2.5 mg, Oral, Daily    bisoprolol (ZEBETA)  5 mg, Oral, Daily    calcium carbonate (OS-YAHAIRA) 600 mg, Oral, Daily    cholecalciferol (vitamin D3) (VITAMIN D3) 2,000 Units, Oral, Daily    doxepin (SINEQUAN) 10 MG capsule TAKE 1 CAPSULE BY MOUTH EVERY DAY    DULoxetine (CYMBALTA) 30 mg, Oral, Daily    DULoxetine (CYMBALTA) 60 mg, Oral, Daily    fluorouraciL (EFUDEX) 0.5 g, Topical (Top), Nightly PRN    fluticasone furoate-vilanteroL (BREO ELLIPTA) 100-25 mcg/dose diskus inhaler 1 puff, Inhalation, Daily, Controller    furosemide (LASIX) 20 mg, Oral, Daily    levothyroxine (SYNTHROID) 25 mcg, Oral    LORazepam (ATIVAN) 1 mg, Oral, Nightly    meclizine (ANTIVERT) 25 mg, Oral, 3 times daily PRN    montelukast (SINGULAIR) 10 mg tablet TAKE 1 TABLET BY MOUTH EVERY DAY IN THE EVENING    rosuvastatin (CRESTOR) 20 mg, Oral, Daily       Review of patient's allergies indicates:   Allergen Reactions    Adhesive tape-silicones      Other reaction(s): blisters skin    Adhesive Rash     Other reaction(s): blisters skin        Patient Care Team:  Anthony Leslie DO as PCP - General (Family Medicine)  Yobani Serrano MD as Consulting Physician (Cardiology)  Nedra Rushing MD (Dermatology)  Mona Pedraza FNP (Endocrinology)  Anali Braun FNP (Endocrinology)  Hola Barrios MD as Consulting Physician (Orthopedic Surgery)  Low Jimenez MD as Consulting Physician (Gastroenterology)  Kenney Benitez MD as Consulting Physician (Dermatology)  Nedra Castillo MD as Consulting Physician (Rheumatology)  Robert Mendez MD as Consulting Physician (Otolaryngology)     Subjective:     Review of Systems    12 point review of systems conducted, negative except as stated in the history of present illness. See HPI for details.    Objective:     Visit Vitals  BP (!) 150/69 (BP Location: Right arm, Patient Position: Sitting, BP Method: Large (Automatic))   Pulse 69   Temp 98.1 °F (36.7 °C)   Resp 18   Ht 5' (1.524 m)   Wt 83.9 kg (185 lb)   SpO2 95%   BMI 36.13 kg/m²        Physical Exam  Vitals and nursing note reviewed.   Constitutional:       General: She is not in acute distress.     Appearance: She is not ill-appearing.   HENT:      Head: Normocephalic and atraumatic.      Mouth/Throat:      Mouth: Mucous membranes are moist.      Pharynx: Oropharynx is clear.   Eyes:      General: No scleral icterus.     Extraocular Movements: Extraocular movements intact.      Conjunctiva/sclera: Conjunctivae normal.      Pupils: Pupils are equal, round, and reactive to light.   Neck:      Vascular: No carotid bruit.   Cardiovascular:      Rate and Rhythm: Normal rate and regular rhythm.      Heart sounds: No murmur heard.     No friction rub. No gallop.   Pulmonary:      Effort: Respiratory distress (shortness of breath upon exertion) present.      Breath sounds: Normal breath sounds. No wheezing, rhonchi or rales.   Abdominal:      General: Abdomen is flat. Bowel sounds are normal. There is distension.      Palpations: Abdomen is soft. There is no mass.      Tenderness: There is abdominal tenderness in the right upper quadrant.   Musculoskeletal:         General: Normal range of motion.      Cervical back: Normal range of motion and neck supple.   Skin:     General: Skin is warm and dry.   Neurological:      General: No focal deficit present.      Mental Status: She is alert.   Psychiatric:         Mood and Affect: Mood normal.       Labs Reviewed:     Chemistry:  Lab Results   Component Value Date     05/01/2024    K 4.3 05/01/2024    CHLORIDE 106 05/01/2024    BUN 14.0 05/01/2024    CREATININE 1.01 05/01/2024    EGFRNORACEVR 59 05/01/2024    GLUCOSE 81 (L) 05/01/2024    CALCIUM 10.0 05/01/2024    ALKPHOS 74 04/04/2024    LABPROT 7.7 (H) 04/04/2024    ALBUMIN 4.2 04/04/2024    BILIDIR 0.2 04/04/2024    IBILI 0.60 04/27/2022    AST 22 04/04/2024    ALT 31 04/04/2024    MG 1.9 10/28/2019    TSH 2.466 05/01/2024    ZKMLAJ4RBRG 0.93 05/01/2024        Lab Results   Component Value Date     HGBA1C 5.8 06/17/2021        Hematology:  Lab Results   Component Value Date    WBC 8.83 01/17/2024    HGB 14.3 01/17/2024    HCT 43.1 01/17/2024     01/17/2024       Lipid Panel:  Lab Results   Component Value Date    CHOL 170 03/14/2024    HDL 58 03/14/2024    LDL 87.00 03/14/2024    TRIG 127 03/14/2024    TOTALCHOLEST 3 03/14/2024        Urine:  Lab Results   Component Value Date    COLORUA Yellow 12/28/2022    APPEARANCEUA Clear 12/28/2022    SGUA <=1.005 12/28/2022    PHUA 6.0 12/28/2022    PROTEINUA Negative 12/28/2022    GLUCOSEUA Negative 12/28/2022    KETONESUA Negative 12/28/2022    BLOODUA Trace (A) 12/28/2022    NITRITESUA Negative 12/28/2022    LEUKOCYTESUR Negative 12/28/2022    RBCUA 3-5 12/28/2022    WBCUA 0-2 12/28/2022    BACTERIA Rare 12/28/2022        Assessment:       ICD-10-CM ICD-9-CM   1. SOB (shortness of breath)  R06.02 786.05   2. Primary hypertension  I10 401.9   3. Abdominal distention  R14.0 787.3   4. Mixed hyperlipidemia  E78.2 272.2        Plan:     1. SOB (shortness of breath)  -     furosemide (LASIX) 20 MG tablet; Take 1 tablet (20 mg total) by mouth once daily.  Dispense: 30 tablet; Refill: 0  -     Ambulatory referral/consult to Pulmonology; Future; Expected date: 05/21/2024  -     X-Ray Chest PA And Lateral; Future; Expected date: 05/20/2024    2. Primary hypertension  Assessment & Plan:  Restart Lasix 20 mg daily.   Continue Bisoprololn 5 mg daily + Amlodipine 2.5 mg daily.   Low Sodium Diet (DASH Diet - Less than 2 grams of sodium per day).  Monitor blood pressure daily and log. Report consistent numbers greater than 140/90.  Maintain healthy weight with goal BMI <30. Exercise 30 minutes per day, 5 days per week.      Orders:  -     furosemide (LASIX) 20 MG tablet; Take 1 tablet (20 mg total) by mouth once daily.  Dispense: 30 tablet; Refill: 0  -     Comprehensive Metabolic Panel; Future; Expected date: 09/02/2024  -     CBC Auto Differential; Future; Expected  date: 09/02/2024    3. Abdominal distention  Assessment & Plan:  Recommended the patient follow up with Dr. Jimenez for further evaluation. An appointment was made for the patient to follow up with them on 6/10/24.       4. Mixed hyperlipidemia  -     Comprehensive Metabolic Panel; Future; Expected date: 09/02/2024  -     Lipid Panel; Future; Expected date: 09/02/2024       Follow up in about 4 months (around 9/20/2024) for Medicare Wellness. In addition to their scheduled follow up, the patient has also been instructed to follow up on as needed basis.     Moo James NP

## 2024-06-09 DIAGNOSIS — J45.40 MODERATE PERSISTENT ASTHMA WITHOUT COMPLICATION: ICD-10-CM

## 2024-06-10 RX ORDER — FLUTICASONE FUROATE AND VILANTEROL TRIFENATATE 100; 25 UG/1; UG/1
1 POWDER RESPIRATORY (INHALATION)
Qty: 60 EACH | Refills: 3 | Status: SHIPPED | OUTPATIENT
Start: 2024-06-10

## 2024-06-12 DIAGNOSIS — K76.0 FATTY LIVER: ICD-10-CM

## 2024-06-12 DIAGNOSIS — R14.0 BLOATING: ICD-10-CM

## 2024-06-12 DIAGNOSIS — R10.13 EPIGASTRIC PAIN: Primary | ICD-10-CM

## 2024-06-12 DIAGNOSIS — R13.10 DYSPHAGIA: ICD-10-CM

## 2024-06-17 ENCOUNTER — HOSPITAL ENCOUNTER (OUTPATIENT)
Dept: RADIOLOGY | Facility: HOSPITAL | Age: 74
Discharge: HOME | End: 2024-06-17
Attending: PHYSICIAN ASSISTANT
Payer: MEDICARE

## 2024-06-17 ENCOUNTER — CLINICAL SUPPORT (OUTPATIENT)
Dept: REHABILITATION | Facility: HOSPITAL | Age: 74
End: 2024-06-17
Attending: PHYSICIAN ASSISTANT
Payer: MEDICARE

## 2024-06-17 DIAGNOSIS — K76.0 FATTY LIVER: ICD-10-CM

## 2024-06-17 DIAGNOSIS — R13.19 OTHER DYSPHAGIA: Primary | ICD-10-CM

## 2024-06-17 DIAGNOSIS — R14.0 BLOATING: ICD-10-CM

## 2024-06-17 DIAGNOSIS — R10.13 EPIGASTRIC PAIN: ICD-10-CM

## 2024-06-17 DIAGNOSIS — R13.10 DYSPHAGIA: ICD-10-CM

## 2024-06-17 PROCEDURE — 25500020 PHARM REV CODE 255: Performed by: PHYSICIAN ASSISTANT

## 2024-06-17 PROCEDURE — 92611 MOTION FLUOROSCOPY/SWALLOW: CPT

## 2024-06-17 PROCEDURE — 74230 X-RAY XM SWLNG FUNCJ C+: CPT | Mod: TC

## 2024-06-17 PROCEDURE — A9698 NON-RAD CONTRAST MATERIALNOC: HCPCS | Performed by: PHYSICIAN ASSISTANT

## 2024-06-17 RX ADMIN — BARIUM SULFATE 10 ML: 0.81 POWDER, FOR SUSPENSION ORAL at 01:06

## 2024-06-17 NOTE — PROGRESS NOTES
"[facility]  Speech Language Pathology Department  Outpatient Modified Barium Swallow Study    Patient Name:  Kayla Wall   MRN:  [id number]    Recommendations     General recommendations:  no SLP intervention indicated  Repeat MBS study: as clinically indicated; not indicated at this time  Diet texture/consistency recommendations: Regular solids (IDDSI 7) and thin liquids (IDDSI 0)  Medications: per patient preference  Swallow strategies/precautions: small bites/sips, slow rate, and upright for PO intake  General precautions: standard    History/Reason for Referral     Kayla Wall is a/n 73 y.o. female referred by Karmen Carrillo for a Modified Barium Swallow Study due to complaints of coughing/choking with PO intake. Pt reports choking on liquids including her coffee and water. She state her concerns have been "going on for years." Other complaints include SOB with all activities including basic ADLs.     Primary medical history includes but not limited to:     Anxiety       Asthma      Basal cell carcinoma (BCC)      Bursitis      Carpal tunnel syndrome, bilateral upper limbs      Elevated diaphragm      Fatty liver      GERD (gastroesophageal reflux disease)      Gout      H. pylori infection      HLD (hyperlipidemia)      HTN (hypertension)      Hypothyroidism      Impingement syndrome of right shoulder      ASHLEY (obstructive sleep apnea)      Osteoarthritis      Primary osteoarthritis of right shoulder      SOB (shortness of breath)        Home diet texture/consistency: Regular and thin liquids  Current Method of Nutrition: PO diet (reg/thin)      Subjective     Patient alert and cooperative.  Spiritual/Cultural/Buddhism Beliefs/Practices that affect care: no     Pain/Comfort: none    Respiratory Status:  room air    Restraints/positioning devices: none    Radiologist: Jenni Velasco MD    Fluoroscopic Findings     Oral Musculature  Dentition:  adequate  Secretion " Management: adequate  Mucosal Quality: good  Facial Movement: WFL  Buccal Strength & Mobility: WFL  Mandibular Strength & Mobility: WFL  Oral Labial Strength & Mobility: WFL  Lingual Strength & Mobility: WFL  Velar Elevation: WFL  Vocal Quality: adequate    Setup  Seated in straight chair  Able to self feed  Adequate head control  Shoulders impacting imaging despite repositioning and instruction to relax her shoulders     Visualization  Lateral view    Oral Phase:   Adequate lip closure  Adequate bolus formation  Adequate mastication  Adequate bolus cohesion  Adequate anterior-posterior transport    Pharyngeal Phase:   Adequate base of tongue retraction  Adequate epiglottic deflection  Reduced hyolaryngeal excursion  Laryngeal penetration with consecutive swallows of liquids; ALL episodes of penetration spontaneously cleared  Consistency Laryngeal Penetration Aspiration Residue   Thin liquid by cup During the swallow  Cleared spontaneously None None   Puree None None None   Chewable solid None None None   Thin liquid by straw During the swallow  Cleared spontaneously None None   Thin liquid by cup - single cup sip None None None   Thin liquid by straw - single cup sip During the swallow  Cleared spontaneously None None     Cervical Esophageal Phase:   UES appeared to accommodate all bolus types without stasis or retrograde movement visualized  Shoulder positioning negatively impacting imaging       Assessment     Pt exhibited mild oropharyngeal dysphagia characterized by the findings noted above.  Laryngeal penetration of liquids with consecutive swallows. All episodes of penetration spontaneously cleared. Single cup sip with appropriate head neutral positioning prevented laryngeal penetration increasing her airway protection.   Swallow efficiency is preserved; however, swallow safety is impaired.     Patient appears to be at low risk for aspiration related pneumonia when considering severity of dysphagia and  history of GERD.      Patient Education     Patient provided with verbal education and video regarding results/recommendations.  Understanding was verbalized.    Time Tracking     SLP Treatment Date:  06/17/2024   Speech Start Time:  1300  Speech Stop Time:  1330     Speech Total Time (min):  30    Billable minutes:   Motion Fluoroscopic Evaluation, Video Recording, 30 minutes     06/17/2024

## 2024-06-18 RX ORDER — LORAZEPAM 0.5 MG/1
1 TABLET ORAL NIGHTLY
Qty: 60 TABLET | Refills: 0 | Status: SHIPPED | OUTPATIENT
Start: 2024-06-18

## 2024-06-24 ENCOUNTER — OFFICE VISIT (OUTPATIENT)
Dept: FAMILY MEDICINE | Facility: CLINIC | Age: 74
End: 2024-06-24
Payer: MEDICARE

## 2024-06-24 ENCOUNTER — HOSPITAL ENCOUNTER (OUTPATIENT)
Dept: RADIOLOGY | Facility: HOSPITAL | Age: 74
Discharge: HOME | End: 2024-06-24
Payer: MEDICARE

## 2024-06-24 VITALS
BODY MASS INDEX: 36.12 KG/M2 | HEART RATE: 66 BPM | SYSTOLIC BLOOD PRESSURE: 157 MMHG | RESPIRATION RATE: 18 BRPM | TEMPERATURE: 98 F | WEIGHT: 184 LBS | DIASTOLIC BLOOD PRESSURE: 76 MMHG | OXYGEN SATURATION: 97 % | HEIGHT: 60 IN

## 2024-06-24 DIAGNOSIS — R29.6 FREQUENT FALLS: ICD-10-CM

## 2024-06-24 DIAGNOSIS — S80.211A ABRASION OF KNEE, BILATERAL: ICD-10-CM

## 2024-06-24 DIAGNOSIS — I10 PRIMARY HYPERTENSION: ICD-10-CM

## 2024-06-24 DIAGNOSIS — M25.561 ACUTE PAIN OF BOTH KNEES: ICD-10-CM

## 2024-06-24 DIAGNOSIS — S80.212A ABRASION OF KNEE, BILATERAL: ICD-10-CM

## 2024-06-24 DIAGNOSIS — M25.562 ACUTE PAIN OF BOTH KNEES: ICD-10-CM

## 2024-06-24 DIAGNOSIS — R06.02 SOB (SHORTNESS OF BREATH): ICD-10-CM

## 2024-06-24 DIAGNOSIS — R29.6 FREQUENT FALLS: Primary | ICD-10-CM
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
## 2024-06-24 PROCEDURE — 73560 X-RAY EXAM OF KNEE 1 OR 2: CPT | Mod: TC,RT

## 2024-06-24 PROCEDURE — 99214 OFFICE O/P EST MOD 30 MIN: CPT | Mod: ,,,

## 2024-06-24 PROCEDURE — 73560 X-RAY EXAM OF KNEE 1 OR 2: CPT | Mod: TC,LT

## 2024-06-24 RX ORDER — DICLOFENAC SODIUM 10 MG/G
2 GEL TOPICAL 4 TIMES DAILY
Qty: 100 G | Refills: 0 | Status: SHIPPED | OUTPATIENT
Start: 2024-06-24 | End: 2024-07-24

## 2024-06-24 RX ORDER — IBUPROFEN 600 MG/1
600 TABLET ORAL EVERY 8 HOURS PRN
Qty: 30 TABLET | Refills: 0 | Status: SHIPPED | OUTPATIENT
Start: 2024-06-24 | End: 2024-07-04

## 2024-06-24 RX ORDER — MUPIROCIN 20 MG/G
OINTMENT TOPICAL 2 TIMES DAILY
Qty: 1 G | Refills: 1 | Status: SHIPPED | OUTPATIENT
Start: 2024-06-24 | End: 2024-07-04

## 2024-06-24 RX ORDER — FUROSEMIDE 20 MG/1
20 TABLET ORAL DAILY
Qty: 30 TABLET | Refills: 2 | Status: SHIPPED | OUTPATIENT
Start: 2024-06-24 | End: 2024-09-22

## 2024-06-24 NOTE — PROGRESS NOTES
Patient ID: [id number]     Chief Complaint: Fall (Saturday Pt fell. Wants XRAY)      HPI:     Kayla Wall, a 73-year-old woman, is here for a follow-up appointment. She mentioned falling on Saturday evening while carrying a watermelon, causing her to slip and fall. Despite scraping both knees and getting abrasions, she did not seek treatment at the ER. She also mentioned hitting the right side of her body but is unsure if she hit her head. This is the fourth fall she's had in the past year. She has an upcoming appointment with Dr. Henderson in July and has no other complaints today.    Past Medical History:   Diagnosis Date    Anxiety     Asthma     Basal cell carcinoma (BCC)     Bursitis     Carpal tunnel syndrome, bilateral upper limbs     Elevated diaphragm     Fatty liver     GERD (gastroesophageal reflux disease)     Gout     H. pylori infection     HLD (hyperlipidemia)     HTN (hypertension)     Hypothyroidism     Impingement syndrome of right shoulder     ASHLEY (obstructive sleep apnea)     Osteoarthritis     Primary osteoarthritis of right shoulder     SOB (shortness of breath)         Past Surgical History:   Procedure Laterality Date    APPENDECTOMY  1964    BASAL CELL CARCINOMA EXCISION Left     nose - Dr Kenney Benitez    BASAL CELL CARCINOMA EXCISION Right 2023    nose - Dr Kenney Benitez    CARDIAC CATHETERIZATION  2021    Dr Yobani Serrano    CARPAL TUNNEL RELEASE Left 2022    Dr Epifanio Ivy    CARPAL TUNNEL RELEASE Right     Dr Petey Saenz     SECTION  1976     SECTION  1971     SECTION  1968    CHOLECYSTECTOMY  2016    Dr Darnell Reihc    COLONOSCOPY N/A 8/3/2023    Procedure: COLONOSCOPY;  Surgeon: Low Jimenez MD;  Location: [facility];  Service: Gastroenterology;  Laterality: N/A;    DISSECTION OF NECK      HYSTERECTOMY  1978    MYELOGRAPHY  10/22/1999    PLANTAR FASCIA RELEASE Left 2019    ROTATOR CUFF REPAIR Right 2017     Dr Kassy Hernadez    SHOULDER ARTHROSCOPY W/ SUBACROMIAL DECOMPRESSION AND DISTAL CLAVICLE EXCISION Right 06/08/2017    Dr Kassy Hernadez    TONSILLECTOMY  1958        Social History     Socioeconomic History    Marital status:    Tobacco Use    Smoking status: Never    Smokeless tobacco: Never   Substance and Sexual Activity    Alcohol use: Not Currently    Drug use: Never    Sexual activity: Not Currently     Social Determinants of Health     Financial Resource Strain: Low Risk  (9/21/2023)    Overall Financial Resource Strain (CARDIA)     Difficulty of Paying Living Expenses: Not hard at all   Food Insecurity: No Food Insecurity (9/21/2023)    Hunger Vital Sign     Worried About Running Out of Food in the Last Year: Never true     Ran Out of Food in the Last Year: Never true   Transportation Needs: No Transportation Needs (9/21/2023)    PRAPARE - Transportation     Lack of Transportation (Medical): No     Lack of Transportation (Non-Medical): No   Physical Activity: Insufficiently Active (9/21/2023)    Exercise Vital Sign     Days of Exercise per Week: 6 days     Minutes of Exercise per Session: 20 min   Stress: Stress Concern Present (9/21/2023)    [employer] [place] [employer] - Occupational Stress Questionnaire     Feeling of Stress : To some extent   Housing Stability: Low Risk  (9/21/2023)    Housing Stability Vital Sign     Unable to Pay for Housing in the Last Year: No     Number of Places Lived in the Last Year: 1     Unstable Housing in the Last Year: No        Current Outpatient Medications   Medication Instructions    albuterol (VENTOLIN HFA) 90 mcg/actuation inhaler 2 puffs, Inhalation, Every 6 hours PRN, Rescue    allopurinoL (ZYLOPRIM) 100 mg, Oral, 3 times daily    amLODIPine (NORVASC) 2.5 mg, Oral, Daily    bisoprolol (ZEBETA) 5 mg, Oral, Daily    BREO ELLIPTA 100-25 mcg/dose diskus inhaler 1 puff, Inhalation    calcium carbonate (OS-YAHAIRA) 600 mg, Oral, Daily     cholecalciferol (vitamin D3) (VITAMIN D3) 2,000 Units, Oral, Daily    diclofenac sodium (VOLTAREN ARTHRITIS PAIN) 2 g, Topical (Top), 4 times daily, After abrasions have healed.    doxepin (SINEQUAN) 10 MG capsule TAKE 1 CAPSULE BY MOUTH EVERY DAY    DULoxetine (CYMBALTA) 30 mg, Oral, Daily    DULoxetine (CYMBALTA) 60 mg, Oral, Daily    fluorouraciL (EFUDEX) 0.5 g, Topical (Top), Nightly PRN    furosemide (LASIX) 20 mg, Oral, Daily    ibuprofen (ADVIL,MOTRIN) 600 mg, Oral, Every 8 hours PRN    levothyroxine (SYNTHROID) 25 mcg, Oral    LORazepam (ATIVAN) 1 mg, Oral, Nightly    meclizine (ANTIVERT) 25 mg, Oral, 3 times daily PRN    montelukast (SINGULAIR) 10 mg tablet TAKE 1 TABLET BY MOUTH EVERY DAY IN THE EVENING    mupirocin (BACTROBAN) 2 % ointment Topical (Top), 2 times daily    rosuvastatin (CRESTOR) 20 mg, Oral, Daily       Review of patient's allergies indicates:   Allergen Reactions    Adhesive tape-silicones      Other reaction(s): blisters skin    Adhesive Rash     Other reaction(s): blisters skin        Patient Care Team:  Anthony Leslie DO as PCP - General (Family Medicine)  Yobani Serrano MD as Consulting Physician (Cardiology)  Nedra Rushing MD (Dermatology)  Mona Pedraza FNP (Inactive) (Endocrinology)  Anali Braun FNP (Endocrinology)  Hola Barrios MD as Consulting Physician (Orthopedic Surgery)  Low Jimenez MD as Consulting Physician (Gastroenterology)  Kenney Benitez MD as Consulting Physician (Dermatology)  Nedra Castillo MD as Consulting Physician (Rheumatology)  Robert Mendez MD as Consulting Physician (Otolaryngology)     Subjective:     Review of Systems    12 point review of systems conducted, negative except as stated in the history of present illness. See HPI for details.    Objective:     Visit Vitals  BP (!) 157/76 (BP Location: Right arm, Patient Position: Sitting, BP Method: Large (Automatic))   Pulse 66   Temp 98 °F (36.7 °C)   Resp 18   Ht 5'  (1.524 m)   Wt 83.5 kg (184 lb)   SpO2 97%   BMI 35.94 kg/m²       Physical Exam  Vitals and nursing note reviewed.   Constitutional:       General: She is not in acute distress.     Appearance: She is not ill-appearing.   HENT:      Head: Normocephalic and atraumatic.      Mouth/Throat:      Mouth: Mucous membranes are moist.      Pharynx: Oropharynx is clear.   Eyes:      General: No scleral icterus.     Extraocular Movements: Extraocular movements intact.      Conjunctiva/sclera: Conjunctivae normal.      Pupils: Pupils are equal, round, and reactive to light.   Neck:      Vascular: No carotid bruit.   Cardiovascular:      Rate and Rhythm: Normal rate and regular rhythm.      Heart sounds: No murmur heard.     No friction rub. No gallop.   Pulmonary:      Effort: Pulmonary effort is normal. No respiratory distress.      Breath sounds: Normal breath sounds. No wheezing, rhonchi or rales.   Abdominal:      General: Abdomen is flat. Bowel sounds are normal. There is no distension.      Palpations: Abdomen is soft. There is no mass.      Tenderness: There is no abdominal tenderness.   Musculoskeletal:         General: Normal range of motion.      Cervical back: Normal range of motion and neck supple.      Right knee: Swelling present. Tenderness present.      Left knee: Swelling present. Tenderness present.      Comments: Abrasions to bilateral knees.    Skin:     General: Skin is warm and dry.      Findings: Abrasion present.   Neurological:      General: No focal deficit present.      Mental Status: She is alert.   Psychiatric:         Mood and Affect: Mood normal.       Labs Reviewed:     Chemistry:  Lab Results   Component Value Date     05/01/2024    K 4.3 05/01/2024    BUN 14.0 05/01/2024    CREATININE 1.01 05/01/2024    EGFRNORACEVR 59 05/01/2024    GLUCOSE 81 (L) 05/01/2024    CALCIUM 10.0 05/01/2024    ALKPHOS 74 04/04/2024    LABPROT 7.7 (H) 04/04/2024    ALBUMIN 4.2 04/04/2024    BILIDIR 0.2  04/04/2024    IBILI 0.60 04/27/2022    AST 22 04/04/2024    ALT 31 04/04/2024    MG 1.9 10/28/2019    TSH 2.466 05/01/2024    GYJVFF2DQMF 0.93 05/01/2024        Lab Results   Component Value Date    HGBA1C 5.8 06/17/2021        Hematology:  Lab Results   Component Value Date    WBC 8.83 01/17/2024    HGB 14.3 01/17/2024    HCT 43.1 01/17/2024     01/17/2024       Lipid Panel:  Lab Results   Component Value Date    CHOL 170 03/14/2024    HDL 58 03/14/2024    LDL 87.00 03/14/2024    TRIG 127 03/14/2024    TOTALCHOLEST 3 03/14/2024        Urine:  Lab Results   Component Value Date    APPEARANCEUA Clear 12/28/2022    SGUA <=1.005 12/28/2022    PROTEINUA Negative 12/28/2022    KETONESUA Negative 12/28/2022    LEUKOCYTESUR Negative 12/28/2022    RBCUA 3-5 12/28/2022    WBCUA 0-2 12/28/2022    BACTERIA Rare 12/28/2022        Assessment:       ICD-10-CM ICD-9-CM   1. Frequent falls  R29.6 V15.88   2. Acute pain of both knees  M25.561 338.19    M25.562 719.46   3. Abrasion of knee, bilateral  S80.211A 916.0    S80.212A    4. SOB (shortness of breath)  R06.02 786.05   5. Primary hypertension  I10 401.9        Plan:     1. Frequent falls  Assessment & Plan:  Stand up slowly.   Wear proper non-slip footwear.   Use non-slip mats to help improve traction on bathroom floors, shower, bathtub, and outside steps.   Make sure you have adequate lighting in hallways, outdoor walkways, etc.   Remove all floor clutter.     Orders:  -     Ambulatory referral/consult to Physical/Occupational Therapy; Future; Expected date: 07/01/2024  -     X-ray Knee Ortho Right; Future; Expected date: 06/24/2024  -     X-ray Knee Ortho Left; Future; Expected date: 06/24/2024    2. Acute pain of both knees  -     ibuprofen (ADVIL,MOTRIN) 600 MG tablet; Take 1 tablet (600 mg total) by mouth every 8 (eight) hours as needed for Pain.  Dispense: 30 tablet; Refill: 0  -     diclofenac sodium (VOLTAREN ARTHRITIS PAIN) 1 % Gel; Apply 2 g topically 4 (four)  times daily. After abrasions have healed.  Dispense: 100 g; Refill: 0    3. Abrasion of knee, bilateral  -     mupirocin (BACTROBAN) 2 % ointment; Apply topically 2 (two) times daily. for 10 days  Dispense: 1 g; Refill: 1    4. SOB (shortness of breath)  -     furosemide (LASIX) 20 MG tablet; Take 1 tablet (20 mg total) by mouth once daily.  Dispense: 30 tablet; Refill: 2    5. Primary hypertension  -     furosemide (LASIX) 20 MG tablet; Take 1 tablet (20 mg total) by mouth once daily.  Dispense: 30 tablet; Refill: 2       Follow up if symptoms worsen or fail to improve, for Keep Scheduled Appointment.. In addition to their scheduled follow up, the patient has also been instructed to follow up on as needed basis.     Future Appointments   Date Time Provider Department Center   9/20/2024 10:00 AM Moo James NP [facility] [address] [facility]      Moo James NP

## 2024-06-24 NOTE — ASSESSMENT & PLAN NOTE
Stand up slowly.   Wear proper non-slip footwear.   Use non-slip mats to help improve traction on bathroom floors, shower, bathtub, and outside steps.   Make sure you have adequate lighting in hallways, outdoor walkways, etc.   Remove all floor clutter.

## 2024-06-26 ENCOUNTER — TELEPHONE (OUTPATIENT)
Dept: FAMILY MEDICINE | Facility: CLINIC | Age: 74
End: 2024-06-26
Payer: MEDICARE

## 2024-06-26 DIAGNOSIS — R25.2 MUSCLE CRAMPING: Primary | ICD-10-CM

## 2024-06-26 NOTE — TELEPHONE ENCOUNTER
----- Message from Moo James NP sent at 6/24/2024  2:50 PM CDT -----  Please inform patient of lab results.     1. Bilateral degenerative changes of the knee. The patient has chondrocalcinosis which is a type of arthritis that can cause flare ups and inflammation due to a buildup of calcium crystals in the joints. We treat with NSAIDs which the patient was given ibuprofen. If the pain persists, we can refer to ortho for steroid injections.     Thanks for all you do,   Moo

## 2024-06-26 NOTE — TELEPHONE ENCOUNTER
I talked to the patient over the phone and we will order CMP and magnesium since she is taking Lasix regularly.

## 2024-06-27 ENCOUNTER — TELEPHONE (OUTPATIENT)
Dept: FAMILY MEDICINE | Facility: CLINIC | Age: 74
End: 2024-06-27
Payer: MEDICARE

## 2024-06-27 ENCOUNTER — LAB VISIT (OUTPATIENT)
Dept: LAB | Facility: HOSPITAL | Age: 74
End: 2024-06-27
Payer: MEDICARE

## 2024-06-27 DIAGNOSIS — R25.2 MUSCLE CRAMPING: ICD-10-CM

## 2024-06-27 LAB
ALBUMIN SERPL-MCNC: 3.9 G/DL (ref 3.4–4.8)
ALBUMIN/GLOB SERPL: 1.1 RATIO (ref 1.1–2)
ALP SERPL-CCNC: 78 UNIT/L (ref 40–150)
ALT SERPL-CCNC: 31 UNIT/L (ref 0–55)
ANION GAP SERPL CALC-SCNC: 10 MEQ/L
AST SERPL-CCNC: 26 UNIT/L (ref 5–34)
BILIRUB SERPL-MCNC: 0.5 MG/DL
BUN SERPL-MCNC: 15 MG/DL (ref 9.8–20.1)
CALCIUM SERPL-MCNC: 9.9 MG/DL (ref 8.4–10.2)
CHLORIDE SERPL-SCNC: 104 MMOL/L (ref 98–107)
CO2 SERPL-SCNC: 25 MMOL/L (ref 23–31)
CREAT SERPL-MCNC: 1.13 MG/DL (ref 0.55–1.02)
CREAT/UREA NIT SERPL: 13
GFR SERPLBLD CREATININE-BSD FMLA CKD-EPI: 51 ML/MIN/1.73/M2
GLOBULIN SER-MCNC: 3.4 GM/DL (ref 2.4–3.5)
GLUCOSE SERPL-MCNC: 193 MG/DL (ref 82–115)
MAGNESIUM SERPL-MCNC: 2 MG/DL (ref 1.6–2.6)
POTASSIUM SERPL-SCNC: 4.4 MMOL/L (ref 3.5–5.1)
PROT SERPL-MCNC: 7.3 GM/DL (ref 5.8–7.6)
SODIUM SERPL-SCNC: 139 MMOL/L (ref 136–145)

## 2024-06-27 PROCEDURE — 80053 COMPREHEN METABOLIC PANEL: CPT

## 2024-06-27 PROCEDURE — 36415 COLL VENOUS BLD VENIPUNCTURE: CPT

## 2024-06-27 PROCEDURE — 83735 ASSAY OF MAGNESIUM: CPT

## 2024-06-27 NOTE — TELEPHONE ENCOUNTER
----- Message from Moo James NP sent at 6/27/2024 11:30 AM CDT -----  Sodium, potassium, and magnesium are within normal ranges.    Increase water intake.

## 2024-07-11 ENCOUNTER — TELEPHONE (OUTPATIENT)
Dept: FAMILY MEDICINE | Facility: CLINIC | Age: 74
End: 2024-07-11
Payer: MEDICARE

## 2024-07-11 DIAGNOSIS — M25.562 ACUTE PAIN OF BOTH KNEES: Primary | ICD-10-CM

## 2024-07-11 DIAGNOSIS — M25.561 ACUTE PAIN OF BOTH KNEES: Primary | ICD-10-CM

## 2024-07-11 RX ORDER — IBUPROFEN 600 MG/1
600 TABLET ORAL EVERY 8 HOURS PRN
Qty: 45 TABLET | Refills: 1 | Status: SHIPPED | OUTPATIENT
Start: 2024-07-11

## 2024-07-11 NOTE — TELEPHONE ENCOUNTER
----- Message from Winifred Miller sent at 7/11/2024  8:38 AM CDT -----  Regarding: med  IBUPROFEN 800, Ms Garza called stating she is finished taking the ibuprofen and she is still hurting she is wondering what else she can take for the pain please give her a call back [phone]      Thanks

## 2024-07-11 NOTE — TELEPHONE ENCOUNTER
Patient is stating she is done taking the ibuprofen and would like a refill for her bilateral knee pain.

## 2024-07-17 ENCOUNTER — CLINICAL SUPPORT (OUTPATIENT)
Dept: REHABILITATION | Facility: HOSPITAL | Age: 74
End: 2024-07-17
Payer: MEDICARE

## 2024-07-17 DIAGNOSIS — Z74.09 IMPAIRED FUNCTIONAL MOBILITY, BALANCE, GAIT, AND ENDURANCE: Primary | ICD-10-CM

## 2024-07-17 DIAGNOSIS — R29.6 FREQUENT FALLS: ICD-10-CM

## 2024-07-17 PROCEDURE — 97110 THERAPEUTIC EXERCISES: CPT

## 2024-07-17 PROCEDURE — 97163 PT EVAL HIGH COMPLEX 45 MIN: CPT

## 2024-07-17 NOTE — PROGRESS NOTES
[facility]   Physical Therapy Initial Evaluation     Date: 7/17/2024   Name: Kayla Wall  Clinic Number: [id number]    Therapy Diagnosis: Impaired functional mobility, gait balance, endurance  Physician: Moo James NP    Physician Orders: PT Eval and Treat  Medical Diagnosis from Referral: Falls, sequela  Evaluation Date: 7/17/2024  Authorization Period Expiration: medically necessary  Plan of Care Expiration: 10/09/2024  Visit # / Visits authorized: medically necessary      Precautions: fall risk; breathing difficulty    Time In: 1337  Time Out: 1427  Total Appointment Time (timed & untimed codes): 50 minutes      SUBJECTIVE     Date of onset: gradual onset with worsening over the last year    History of current condition - Kayla reports: that this is the fourth fall she's had in the past year. Reports that this last fall was worst than the others. She admits to issues with her balance. She feels that her legs are weak with the left worst than the right. There are times when her legs just give out. After sitting for extended periods it is hard for her to get up due to weakness and pain in both knees. She tires easily when doing chores around the house and has to stop and take multiple rests breaks when doing so. She has issues with her breathing and becomes easily short of breath. She is scheduled to see a pulmonologist tomorrow. She has daily back pain. For these reason she has not been active. She is sleeping all night but does so using a CPAP.    Falls: multiple fals    Reason for visit: falls ,decreased balance, weakness  Onset time and any changes: gradual onset with worsening over the past year.  Pain level and location: 5/10; both knees and back  Radiate proximal or distal: denies  Describe pain: aching, dull, stiffness, and throbbing  Numbness/tingling: denies   Agg factors: prolong activity or positioning  Ease factors: rest, Tylenol  Sleeping position: back  Worse  when: any time during the day or night  Functional Limitations: limited with ADL's, limited functional mobility, limited functional capacity  Goals: get stronger, improve her breathing so she can do more and prevent falls  Prior therapy/intervention: none      ImagingXR KNEE 1 OR 2 VIEW RIGHT     CLINICAL HISTORY:  Repeated falls     COMPARISON:  None.     FINDINGS:  No acute displaced fractures or dislocations.     There is some narrowing of the medial compartment of the knee joint articular spaces are otherwise preserved with smooth articular surfaces     No blastic or lytic lesions.     Cartilage calcification identified indicating the presence of chondrocalcinosis     Impression:     Degenerative changes.     Changes of chondrocalcinosis.       Medical History:   Past Medical History:   Diagnosis Date    Anxiety     Asthma     Basal cell carcinoma (BCC)     Bursitis     Carpal tunnel syndrome, bilateral upper limbs     Elevated diaphragm     Fatty liver     GERD (gastroesophageal reflux disease)     Gout     H. pylori infection     HLD (hyperlipidemia)     HTN (hypertension)     Hypothyroidism     Impingement syndrome of right shoulder     ASHLEY (obstructive sleep apnea)     Osteoarthritis     Primary osteoarthritis of right shoulder     SOB (shortness of breath)        Surgical History:   Kayla Wall  has a past surgical history that includes Tonsillectomy (); Cholecystectomy (2016); Appendectomy (); Hysterectomy (); Carpal tunnel release (Left, 2022); Plantar fascia release (Left, ); Cardiac catheterization (2021);  section ();  section ();  section (); Rotator cuff repair (Right, 2017); Shoulder arthroscopy w/ subacromial decompression and distal clavicle excision (Right, 2017); Dissection of neck (); Carpal tunnel release (Right, ); Myelography (10/22/1999); Excision basal cell carcinoma (Left, ); Excision basal  cell carcinoma (Right, 02/09/2023); and Colonoscopy (N/A, 8/3/2023).    Medications:   Kayla has a current medication list which includes the following prescription(s): albuterol, allopurinol, amlodipine, bisoprolol, breo ellipta, calcium carbonate, cholecalciferol (vitamin d3), diclofenac sodium, doxepin, duloxetine, duloxetine, fluorouracil, furosemide, ibuprofen, levothyroxine, lorazepam, meclizine, montelukast, and rosuvastatin.    Allergies:   Review of patient's allergies indicates:   Allergen Reactions    Adhesive tape-silicones      Other reaction(s): blisters skin    Adhesive Rash     Other reaction(s): blisters skin        CMS Impairment/Limitation/Restriction for FOTO Survey  Therapist reviewed FOTO scores for Kayla Wall on 7/17/2024.   FOTO documents entered into [employer] - see Media section.     Eval: Patient's Physical FS Primary Measure: 45 (goal is 54 at visit #12)  Eval: Risk Adjusted Statistical FOTO: 47  Eval: Limitation Score: 55%  Eval: Category: Mobility  Eval: Activities Specific Balance Scale: 32.5% functional (lower score greater disability)    OBJECTIVE       Posture: Guarding, trunk anterior to pelvis     Gait Analysis: ambulates without assistive device, slow guarded gait speed, Patient ambulates w trunk anterior to pelvis, decreased step length with slight shuffle and without heel strike, fatigues easily       Dermatomes     Intact to light touch throughout B Ue's and LE's.  Denies any numbness and tingling throughout B UE's and LE's       Reflexes     Sheikh: absent bilaterally  Right: patellar: 2+ and Achilles: 2  Left: patellar: 2+ and Achilles: 2      Myotomes     HIP FLX- Left 4/5, Right 5/5  HIP EXT- Left 4/5, Right  5/5  HIP ER - Left  5/5, Right 5/5  HIP IR - Left 5/5, Right  5/5  HIP ABD - Left 5/5 Right 5/5  HIP ADD - Left 5/5, Right 5/5  KNEE FLX-Left 4/5 Right  5/5  KNEE EXT-  Left 4/5, Right 5/5  ANKLE PF- Left 5/5, Right  5/5  ANKLE DF- Left 5/5, Right 5/5  GREAT TOE EXT  - Left5/5, Right 5/5     Shoulder Flexion - Left 5/5, Right 5/5  Shoulder Extension - Left 5/5, Right 5/5  Shoulder Scaption - Right 5/5, Right 5/5  Shoulder Abduction - Right 5/5, Left 5/5  Elbow flx - Right 5/5, Right 5/5  Elbow ext - Right 5/5, Right  5/5   strength-Right 5/5, Right 5/5      Balance     4 Stage balance test:     Static stand with feet side by side-5 secs with moderate sway  Static stand instep of foot touching the big toe of other-4 secs with loss of balance.  Static Stance Tandem R forward: 2 secs with loss of balance  Static Stance Tandem L forward: 2 secs with loss of balance  Static stand on one foot- unable to perform      Static standing on flat ground with eyes open-mild sway  Static standing on flat ground with eyes closed-mod to severe sway     Rhomberg: 3 secs with loss of balance  Functional Reach test: approx 5 inches     R SLS = unable to perform   L SLS = unable to perform       Proprioception     Intact for all 4 extremities         Other     5x Sit to stand test: unable to perform without upper extremity support         TREATMENT        Time Activities   Manual     TherAct     TherEx 15 HEP; heel raises, stand hip abd, stand hip flex, mini squats   Gait     Neuro Re-ed     Modalities     E-Stim     Dry Needling     Canalith Repositioning           PATIENT EDUCATION AND HOME EXERCISES     Education provided:   - Plan of care, energy conservation, HEP     Written Home Exercises Provided: yes. Exercises were reviewed and Kayla was able to demonstrate them prior to the end of the session.  Kayla demonstrated good  understanding of the education provided. See EMR under Patient Instructions for exercises provided during therapy sessions.    ASSESSMENT     Kayla is a 73 y.o. female referred to outpatient Physical Therapy with a medical diagnosis of Falls, sequela. Patient presents with pain weakness, decreased balance, decreased functional mobility, decreased functional endurance.  Patient will benefit from skilled outpatient Physical Therapy to address the deficits stated above and in the chart below, provide education, and to maximize patient's level of independence.    Patient prognosis is Good.     Plan of care discussed with patient: Yes  Patient's spiritual, cultural and educational needs considered and patient is agreeable to the plan of care and goals as stated below:     Anticipated Barriers for therapy: none    Goals:  Short Term Goals: 6 weeks   Patient will report at least 10% disability reduction on the Balance functional outcome score to indicate clinically significant functional improvement  Patient will report at least 10 point increase on initial FOTO score to indicate clinically significant functional improvement  Patient will demonstrate a self reported outcome of 25% improvement with her balance and quality gait to allow for safe functional mobility  Patient will demonstrate 10% percent improvement on the initial assessment balance tests     Long Term Goals: 12  weeks   Patient will report at least 20% disability reduction on the Balance functional outcome score to indicate clinically significant functional improvement  Patient will report at least 20 point increase on initial FOTO score to indicate clinically significant functional improvement  Patient will demonstrate a self reported outcome of 50% improvement with her balance and quality of gait to allow for safe functional mobility  Patient will demonstrate 20% percent improvement on the initial assessment balance tests    PLAN   Plan of care Certification: 7/17/2024 to 10/09/2024.    Outpatient Physical Therapy 2-3 times weekly for 12 weeks to include the following interventions: Gait Training, Manual Therapy, Neuromuscular Re-ed, Patient Education, Self Care, Therapeutic Activities, and Therapeutic Exercise.     Bang Anthony, PT

## 2024-07-18 NOTE — PLAN OF CARE
[facility]   Physical Therapy Initial Evaluation     Date: 7/17/2024   Name: Kayla Wall  Clinic Number: [id number]    Therapy Diagnosis: Impaired functional mobility, gait balance, endurance  Physician: Moo James NP    Physician Orders: PT Eval and Treat  Medical Diagnosis from Referral: Falls, sequela  Evaluation Date: 7/17/2024  Authorization Period Expiration: medically necessary  Plan of Care Expiration: 10/09/2024  Visit # / Visits authorized: medically necessary      Precautions: fall risk; breathing difficulty    Time In: 1337  Time Out: 1427  Total Appointment Time (timed & untimed codes): 50 minutes      SUBJECTIVE     Date of onset: gradual onset with worsening over the last year    History of current condition - Kayla reports: that this is the fourth fall she's had in the past year. Reports that this last fall was worst than the others. She admits to issues with her balance. She feels that her legs are weak with the left worst than the right. There are times when her legs just give out. After sitting for extended periods it is hard for her to get up due to weakness and pain in both knees. She tires easily when doing chores around the house and has to stop and take multiple rests breaks when doing so. She has issues with her breathing and becomes easily short of breath. She is scheduled to see a pulmonologist tomorrow. She has daily back pain. She is not able to stand for long and she is unable to walk any meaningful distance. For these reason she has not been active. She is sleeping all night but does so using a CPAP.    Falls: multiple fals    Reason for visit: falls ,decreased balance, weakness  Onset time and any changes: gradual onset with worsening over the past year.  Pain level and location: 5/10; both knees and back  Radiate proximal or distal: denies  Describe pain: aching, dull, stiffness, and throbbing  Numbness/tingling: denies   Agg factors: prolong  activity or positioning  Ease factors: rest, Tylenol  Sleeping position: back  Worse when: in the mornings when getting up  Functional Limitations: limited with ADL's, limited functional mobility, limited functional capacity  Goals: get stronger, improve her breathing so she can do more and prevent falls  Prior therapy/intervention: none      ImagingXR KNEE 1 OR 2 VIEW RIGHT     CLINICAL HISTORY:  Repeated falls     COMPARISON:  None.     FINDINGS:  No acute displaced fractures or dislocations.     There is some narrowing of the medial compartment of the knee joint articular spaces are otherwise preserved with smooth articular surfaces     No blastic or lytic lesions.     Cartilage calcification identified indicating the presence of chondrocalcinosis     Impression:     Degenerative changes.     Changes of chondrocalcinosis.       Medical History:   Past Medical History:   Diagnosis Date    Anxiety     Asthma     Basal cell carcinoma (BCC)     Bursitis     Carpal tunnel syndrome, bilateral upper limbs     Elevated diaphragm     Fatty liver     GERD (gastroesophageal reflux disease)     Gout     H. pylori infection     HLD (hyperlipidemia)     HTN (hypertension)     Hypothyroidism     Impingement syndrome of right shoulder     ASHLEY (obstructive sleep apnea)     Osteoarthritis     Primary osteoarthritis of right shoulder     SOB (shortness of breath)        Surgical History:   Kayla Wall  has a past surgical history that includes Tonsillectomy (); Cholecystectomy (2016); Appendectomy (); Hysterectomy (); Carpal tunnel release (Left, 2022); Plantar fascia release (Left, ); Cardiac catheterization (2021);  section ();  section ();  section (); Rotator cuff repair (Right, 2017); Shoulder arthroscopy w/ subacromial decompression and distal clavicle excision (Right, 2017); Dissection of neck (); Carpal tunnel release (Right, );  Myelography (10/22/1999); Excision basal cell carcinoma (Left, 2020); Excision basal cell carcinoma (Right, 02/09/2023); and Colonoscopy (N/A, 8/3/2023).    Medications:   Kayla has a current medication list which includes the following prescription(s): albuterol, allopurinol, amlodipine, bisoprolol, breo ellipta, calcium carbonate, cholecalciferol (vitamin d3), diclofenac sodium, doxepin, duloxetine, duloxetine, fluorouracil, furosemide, ibuprofen, levothyroxine, lorazepam, meclizine, montelukast, and rosuvastatin.    Allergies:   Review of patient's allergies indicates:   Allergen Reactions    Adhesive tape-silicones      Other reaction(s): blisters skin    Adhesive Rash     Other reaction(s): blisters skin        CMS Impairment/Limitation/Restriction for FOTO Survey  Therapist reviewed FOTO scores for Kayla Wall on 7/17/2024.   FOTO documents entered into [employer] - see Media section.     Eval: Patient's Physical FS Primary Measure: 45 (goal is 54 at visit #12)  Eval: Risk Adjusted Statistical FOTO: 47  Eval: Limitation Score: 55%  Eval: Category: Mobility  Eval: Activities Specific Balance Scale: 32.5% functional (lower score greater disability)    OBJECTIVE       Posture: Guarding, trunk anterior to pelvis     Gait Analysis: ambulates without assistive device, slow guarded gait speed, Patient ambulates w trunk anterior to pelvis, decreased step length with slight shuffle and without heel strike, fatigues easily       Dermatomes     Intact to light touch throughout B Ue's and LE's.  Denies any numbness and tingling throughout B UE's and LE's       Reflexes     Sheikh: absent bilaterally  Right: patellar: 2+ and Achilles: 2  Left: patellar: 2+ and Achilles: 2      Myotomes     HIP FLX- Left 4/5, Right 5/5  HIP EXT- Left 4/5, Right  5/5  HIP ER - Left  5/5, Right 5/5  HIP IR - Left 5/5, Right  5/5  HIP ABD - Left 5/5 Right 5/5  HIP ADD - Left 5/5, Right 5/5  KNEE FLX-Left 4/5 Right  5/5  KNEE EXT-  Left 4/5,  Right 5/5  ANKLE PF- Left 5/5, Right  5/5  ANKLE DF- Left 5/5, Right 5/5  GREAT TOE EXT - Left5/5, Right 5/5     Shoulder Flexion - Left 5/5, Right 5/5  Shoulder Extension - Left 5/5, Right 5/5  Shoulder Scaption - Right 5/5, Right 5/5  Shoulder Abduction - Right 5/5, Left 5/5  Elbow flx - Right 5/5, Right 5/5  Elbow ext - Right 5/5, Right  5/5   strength-Right 5/5, Right 5/5      Balance     4 Stage balance test:     Static stand with feet side by side-5 secs with moderate sway  Static stand instep of foot touching the big toe of other-4 secs with loss of balance.  Static Stance Tandem R forward: 2 secs with loss of balance  Static Stance Tandem L forward: 2 secs with loss of balance  Static stand on one foot- unable to perform      Static standing on flat ground with eyes open-mild sway  Static standing on flat ground with eyes closed-mod to severe sway     Rhomberg: 3 secs with loss of balance  Functional Reach test: approx 5 inches     R SLS = unable to perform   L SLS = unable to perform       Proprioception     Intact for all 4 extremities         Other     5x Sit to stand test: unable to perform without upper extremity support         TREATMENT        Time Activities   Manual     TherAct     TherEx 15 HEP; heel raises, stand hip abd, stand hip flex, mini squats   Gait     Neuro Re-ed     Modalities     E-Stim     Dry Needling     Canalith Repositioning           PATIENT EDUCATION AND HOME EXERCISES     Education provided:   - Plan of care, energy conservation, HEP     Written Home Exercises Provided: yes. Exercises were reviewed and Kayla was able to demonstrate them prior to the end of the session.  Kayla demonstrated good  understanding of the education provided. See EMR under Patient Instructions for exercises provided during therapy sessions.    ASSESSMENT     Kayla is a 73 y.o. female referred to outpatient Physical Therapy with a medical diagnosis of Falls, sequela. Patient presents with pain  weakness, decreased balance, decreased functional mobility, decreased functional endurance. Patient will benefit from skilled outpatient Physical Therapy to address the deficits stated above and in the chart below, provide education, and to maximize patient's level of independence.    Patient prognosis is Good.     Plan of care discussed with patient: Yes  Patient's spiritual, cultural and educational needs considered and patient is agreeable to the plan of care and goals as stated below:     Anticipated Barriers for therapy: none    Goals:  Short Term Goals: 6 weeks   Patient will report at least 10% disability reduction on the Balance functional outcome score to indicate clinically significant functional improvement  Patient will report at least 10 point increase on initial FOTO score to indicate clinically significant functional improvement  Patient will demonstrate a self reported outcome of 25% improvement with her balance and quality gait to allow for safe functional mobility  Patient will demonstrate 10% percent improvement on the initial assessment balance tests  Patient will report 10% improvement in being able to able to perform all household chores      Long Term Goals: 12  weeks   Patient will report at least 20% disability reduction on the Balance functional outcome score to indicate clinically significant functional improvement  Patient will report at least 20 point increase on initial FOTO score to indicate clinically significant functional improvement  Patient will demonstrate a self reported outcome of 50% improvement with her balance and quality of gait to allow for safe functional mobility  Patient will demonstrate 20% percent improvement on the initial assessment balance tests  Patient will report 20% improvement in being able to able to perform all household          chores    PLAN   Plan of care Certification: 7/17/2024 to 10/09/2024.    Outpatient Physical Therapy 2-3 times weekly for 12 weeks  to include the following interventions: Gait Training, Manual Therapy, Neuromuscular Re-ed, Patient Education, Self Care, Therapeutic Activities, and Therapeutic Exercise. .     Bang Anthony, PT

## 2024-07-19 ENCOUNTER — HOSPITAL ENCOUNTER (OUTPATIENT)
Dept: RADIOLOGY | Facility: HOSPITAL | Age: 74
Discharge: HOME | End: 2024-07-19
Attending: PHYSICIAN ASSISTANT
Payer: MEDICARE

## 2024-07-19 DIAGNOSIS — K76.0 FATTY LIVER: ICD-10-CM

## 2024-07-19 DIAGNOSIS — R10.13 ABDOMINAL PAIN, EPIGASTRIC: ICD-10-CM

## 2024-07-19 DIAGNOSIS — R13.10 DYSPHAGIA: ICD-10-CM

## 2024-07-19 PROCEDURE — 76700 US EXAM ABDOM COMPLETE: CPT | Mod: TC

## 2024-07-20 DIAGNOSIS — J45.20 MILD INTERMITTENT ASTHMA WITHOUT COMPLICATION: Chronic | ICD-10-CM

## 2024-07-22 RX ORDER — MONTELUKAST SODIUM 10 MG/1
TABLET ORAL
Qty: 90 TABLET | Refills: 3 | Status: SHIPPED | OUTPATIENT
Start: 2024-07-22

## 2024-07-23 ENCOUNTER — CLINICAL SUPPORT (OUTPATIENT)
Dept: REHABILITATION | Facility: HOSPITAL | Age: 74
End: 2024-07-23
Payer: MEDICARE

## 2024-07-23 DIAGNOSIS — Z74.09 IMPAIRED FUNCTIONAL MOBILITY, BALANCE, GAIT, AND ENDURANCE: ICD-10-CM

## 2024-07-23 DIAGNOSIS — R29.6 FREQUENT FALLS: Primary | ICD-10-CM

## 2024-07-23 PROCEDURE — 97110 THERAPEUTIC EXERCISES: CPT

## 2024-07-23 NOTE — PROGRESS NOTES
"  Physical Therapy Treatment Note     Name: Kayla Wall  Clinic Number: [id number]    Therapy Diagnosis: Falls, weakness, poor balance  Physician: Moo James NP    Visit Date: 7/23/2024    Physician Orders: PT Eval and Treat  Medical Diagnosis from Referral: Falls, sequela  Evaluation Date: 7/17/2024  Authorization Period Expiration: medically necessary  Plan of Care Expiration: 10/09/2024  Visit #/Visits authorized: visit #1    Time In: 1206  Time Out: 1302  Total Billable Time: 56 minutes    Surgery: none  Orthopedic Precautions: none  Pertinent History: see medical chart    Subjective     Patient reports: that she saw the Pulmonologist and they upped the dose of her inhaler and rescue inhaler. If her breathing does not improve they will do a heart workup. She agrees to her PT session.    Response to previous treatment: good    Pain: 3/10  Location: bilateral lower legs     Outcome Measure:  Therapist reviewed FOTO scores for Kayla Wall on 7/17/2024.   FOTO documents entered into EPIC - see Media section.     Eval: Patient's Physical FS Primary Measure: 45 (goal is 54 at visit #12)  Eval: Risk Adjusted Statistical FOTO: 47  Eval: Limitation Score: 55%  Eval: Category: Mobility  Eval: Activities Specific Balance Scale: 32.5% functional (lower score greater disability)    Objective     Kayla received the following treatment:     Time Activities   Manual     TherAct     TherEx 56 NuStep, heel raises/toe raise, LAQ, hip flex, hip abd, add ball squeeze, clamshell, stand hip abd, stand hip flex, mini squats, wall rail squats, 6" step ups; 2 min walk, wall walks    Gait     Neuro Re-ed     Modalities     E-Stim     Dry Needling     Canalith Repositioning           Home Exercises Provided and Patient Education Provided     Education provided:   - Plan of care, energy conservation, HEP     Written Home Exercises Provided: yes. Exercises were reviewed and Kayla was able to demonstrate them prior to the end " of the session.  Kayla demonstrated good  understanding of the education provided. See EMR under Patient Instructions for exercises provided during therapy sessions.    Assessment     Kayla is a 73 y.o. female referred to outpatient Physical Therapy with a medical diagnosis of Falls, sequela. Patient presents with pain weakness, decreased balance, decreased functional mobility, decreased functional endurance.     Kayla was put through targeted exercises for lower extremity strength and exercise tolerance. She required frequent rest breaks with some shortness of breath, there was audible wheezing at times. Attempted to monitor Sp02 with a pulse ox but she had finger nail polish on all fingers so an accurate reading could not be made. When she would recover we would move on to the next exercise. Her left leg is weaker than the right. Overall she tolerated things fairly well but did experience hip pains with some of the exercises and with walking after a certain distance. She is deconditioned. Will work on building strength and improving exercise tolerance. Will try ear attachment to monitor 02 sat at next visit.    Patient prognosis is Good.      Anticipated barriers to physical therapy: none    Goals: Kayla Is progressing well towards her goals.    Short Term Goals: 6 weeks   Patient will report at least 10% disability reduction on the Balance functional outcome score to indicate clinically significant functional improvement  Patient will report at least 10 point increase on initial FOTO score to indicate clinically significant functional improvement  Patient will demonstrate a self reported outcome of 25% improvement with her balance and quality gait to allow for safe functional mobility  Patient will demonstrate 10% percent improvement on the initial assessment balance tests  Patient will report 10% improvement in being able to able to perform all household chores      Long Term Goals: 12  weeks   Patient will  report at least 20% disability reduction on the Balance functional outcome score to indicate clinically significant functional improvement  Patient will report at least 20 point increase on initial FOTO score to indicate clinically significant functional improvement  Patient will demonstrate a self reported outcome of 50% improvement with her balance and quality of gait to allow for safe functional mobility  Patient will demonstrate 20% percent improvement on the initial assessment balance tests  Patient will report 20% improvement in being able to able to perform all household          chores    Plan     Plan of care Certification: 7/17/2024 to 10/09/2024.     Outpatient Physical Therapy 2-3 times weekly for 12 weeks to include the following interventions: Gait Training, Manual Therapy, Neuromuscular Re-ed, Patient Education, Self Care, Therapeutic Activities, and Therapeutic Exercise. .     Bang Anthony, PT

## 2024-07-25 ENCOUNTER — CLINICAL SUPPORT (OUTPATIENT)
Dept: REHABILITATION | Facility: HOSPITAL | Age: 74
End: 2024-07-25
Payer: MEDICARE

## 2024-07-25 DIAGNOSIS — Z74.09 IMPAIRED FUNCTIONAL MOBILITY, BALANCE, GAIT, AND ENDURANCE: ICD-10-CM

## 2024-07-25 DIAGNOSIS — R29.6 FREQUENT FALLS: Primary | ICD-10-CM

## 2024-07-25 PROCEDURE — 97110 THERAPEUTIC EXERCISES: CPT

## 2024-07-25 PROCEDURE — 97112 NEUROMUSCULAR REEDUCATION: CPT

## 2024-07-25 NOTE — PROGRESS NOTES
"  Physical Therapy Treatment Note     Name: Kayla Wall  Clinic Number: [id number]    Therapy Diagnosis: Falls, weakness, poor balance  Physician: Moo James NP    Visit Date: 7/25/2024    Physician Orders: PT Eval and Treat  Medical Diagnosis from Referral: Falls, sequela  Evaluation Date: 7/17/2024  Authorization Period Expiration: medically necessary  Plan of Care Expiration: 10/09/2024  Visit #/Visits authorized: visit #2    Time In: 1202  Time Out: 1310  Total Billable Time: 68 minutes    Surgery: none  Orthopedic Precautions: none  Pertinent History: see medical chart    Subjective     Patient reports: Kayla reports that she used her inhaler before coming to therapy and hopefully her breathing will be good. Remains with leg pains but not as bad. She agrees to her PT session.    Response to previous treatment: good    Pain: 3/10  Location: bilateral lower legs     Outcome Measure:  Therapist reviewed FOTO scores for Kayla Wall on 7/17/2024.   FOTO documents entered into EPIC - see Media section.     Eval: Patient's Physical FS Primary Measure: 45 (goal is 54 at visit #12)  Eval: Risk Adjusted Statistical FOTO: 47  Eval: Limitation Score: 55%  Eval: Category: Mobility  Eval: Activities Specific Balance Scale: 32.5% functional (lower score greater disability)    Objective     Kayla received the following treatment:     Time Activities   Manual     TherAct     TherEx 56 NuStep, heel raises/toe raise, LAQ, hip flex, hip abd, add ball squeeze, clamshell, stand hip abd, stand hip flex, mini squats, wall rail squats, 6" step ups; 2 min walk, wall walks    Gait     Neuro Re-ed 12 Balance pads for tandem stance, single leg stance   Modalities     E-Stim     Dry Needling     Canalith Repositioning           Home Exercises Provided and Patient Education Provided     Education provided:   - Plan of care, energy conservation, HEP     Written Home Exercises Provided: yes. Exercises were reviewed and Kayla " was able to demonstrate them prior to the end of the session.  Kayla demonstrated good  understanding of the education provided. See EMR under Patient Instructions for exercises provided during therapy sessions.    Assessment     Kayla is a 73 y.o. female referred to outpatient Physical Therapy with a medical diagnosis of Falls, sequela. Patient presents with pain weakness, decreased balance, decreased functional mobility, decreased functional endurance.     Continued with the same routine. Kayla was put through targeted exercises for lower extremity strength and exercise tolerance. She required frequent rest breaks with some shortness of breath, there was audible wheezing at times. We did monitor her Sp02 with a pulse ox using an ear probe and throughout the session she remained in the mid to low 90's. She was given multiple rest breaks and when she would recover we would move on to the next exercise. Did some balance work on the balance pads and she struggled. Her left leg is weaker than the right. Overall she tolerated things fairly well but did experience hip pains with some of the exercises and with walking after a certain distance. She is deconditioned. Will work on building strength and improving exercise tolerance. Will try ear attachment to monitor 02 sat at next visit.    Patient prognosis is Good.      Anticipated barriers to physical therapy: none    Goals: Kayla Is progressing well towards her goals.    Short Term Goals: 6 weeks   Patient will report at least 10% disability reduction on the Balance functional outcome score to indicate clinically significant functional improvement  Patient will report at least 10 point increase on initial FOTO score to indicate clinically significant functional improvement  Patient will demonstrate a self reported outcome of 25% improvement with her balance and quality gait to allow for safe functional mobility  Patient will demonstrate 10% percent improvement on the  initial assessment balance tests  Patient will report 10% improvement in being able to able to perform all household chores      Long Term Goals: 12  weeks   Patient will report at least 20% disability reduction on the Balance functional outcome score to indicate clinically significant functional improvement  Patient will report at least 20 point increase on initial FOTO score to indicate clinically significant functional improvement  Patient will demonstrate a self reported outcome of 50% improvement with her balance and quality of gait to allow for safe functional mobility  Patient will demonstrate 20% percent improvement on the initial assessment balance tests  Patient will report 20% improvement in being able to able to perform all household          chores    Plan     Plan of care Certification: 7/17/2024 to 10/09/2024.     Outpatient Physical Therapy 2-3 times weekly for 12 weeks to include the following interventions: Gait Training, Manual Therapy, Neuromuscular Re-ed, Patient Education, Self Care, Therapeutic Activities, and Therapeutic Exercise. .     Bang Anthony, PT

## 2024-07-29 ENCOUNTER — OFFICE VISIT (OUTPATIENT)
Dept: FAMILY MEDICINE | Facility: CLINIC | Age: 74
End: 2024-07-29
Payer: MEDICARE

## 2024-07-29 VITALS
SYSTOLIC BLOOD PRESSURE: 124 MMHG | WEIGHT: 184.38 LBS | TEMPERATURE: 98 F | BODY MASS INDEX: 36.2 KG/M2 | HEIGHT: 60 IN | HEART RATE: 74 BPM | DIASTOLIC BLOOD PRESSURE: 71 MMHG | RESPIRATION RATE: 20 BRPM | OXYGEN SATURATION: 96 %

## 2024-07-29 DIAGNOSIS — N18.31 STAGE 3A CHRONIC KIDNEY DISEASE: ICD-10-CM

## 2024-07-29 DIAGNOSIS — L30.4 INTERTRIGO: ICD-10-CM

## 2024-07-29 DIAGNOSIS — A60.04 HERPES SIMPLEX VULVOVAGINITIS: Primary | ICD-10-CM

## 2024-07-29 PROCEDURE — 99214 OFFICE O/P EST MOD 30 MIN: CPT | Mod: ,,,

## 2024-07-29 RX ORDER — MICONAZOLE NITRATE 2 %
POWDER (GRAM) TOPICAL
Qty: 43 G | Refills: 1 | Status: SHIPPED | OUTPATIENT
Start: 2024-07-29 | End: 2024-08-08

## 2024-07-29 RX ORDER — VALACYCLOVIR HYDROCHLORIDE 500 MG/1
500 TABLET, FILM COATED ORAL 2 TIMES DAILY
Qty: 10 TABLET | Refills: 0 | Status: SHIPPED | OUTPATIENT
Start: 2024-07-29 | End: 2024-08-03

## 2024-07-29 NOTE — PROGRESS NOTES
Patient ID: [id number]     Chief Complaint: herpes outbreak (Requesting medication )    HPI:     Kayla Wall is a 73 y.o. female here today for a genital herpes outbreak. She noticed symptoms approx. 5 days ago. She has a history of having one outbreak before.     Past Medical History:   Diagnosis Date    Anxiety     Asthma     Basal cell carcinoma (BCC)     Bursitis     Carpal tunnel syndrome, bilateral upper limbs     Elevated diaphragm     Fatty liver     Genital herpes     GERD (gastroesophageal reflux disease)     Gout     H. pylori infection     HLD (hyperlipidemia)     HTN (hypertension)     Hypothyroidism     Impingement syndrome of right shoulder     ASHLEY (obstructive sleep apnea)     Osteoarthritis     Primary osteoarthritis of right shoulder     SOB (shortness of breath)         Past Surgical History:   Procedure Laterality Date    APPENDECTOMY  1964    BASAL CELL CARCINOMA EXCISION Left     nose - Dr Kenney Benitez    BASAL CELL CARCINOMA EXCISION Right 2023    nose - Dr Kenney Benitez    CARDIAC CATHETERIZATION  2021    Dr Yobani Serrano    CARPAL TUNNEL RELEASE Left 2022    Dr Epifanio Ivy    CARPAL TUNNEL RELEASE Right     Dr Petey Saenz     SECTION  1976     SECTION  1971     SECTION  1968    CHOLECYSTECTOMY  2016    Dr Darnell Reich    COLONOSCOPY N/A 8/3/2023    Procedure: COLONOSCOPY;  Surgeon: Low Jimenez MD;  Location: [facility];  Service: Gastroenterology;  Laterality: N/A;    DISSECTION OF NECK      HYSTERECTOMY  1978    MYELOGRAPHY  10/22/1999    PLANTAR FASCIA RELEASE Left 2019    ROTATOR CUFF REPAIR Right 2017    Dr Kassy Hernadez    SHOULDER ARTHROSCOPY W/ SUBACROMIAL DECOMPRESSION AND DISTAL CLAVICLE EXCISION Right 2017    Dr Kassy Hernadez    TONSILLECTOMY  8        Social History     Socioeconomic History    Marital status:    Tobacco Use    Smoking status: Never    Smokeless  tobacco: Never   Substance and Sexual Activity    Alcohol use: Not Currently    Drug use: Never    Sexual activity: Not Currently     Social Determinants of Health     Financial Resource Strain: Low Risk  (9/21/2023)    Overall Financial Resource Strain (CARDIA)     Difficulty of Paying Living Expenses: Not hard at all   Food Insecurity: No Food Insecurity (9/21/2023)    Hunger Vital Sign     Worried About Running Out of Food in the Last Year: Never true     Ran Out of Food in the Last Year: Never true   Transportation Needs: No Transportation Needs (9/21/2023)    PRAPARE - Transportation     Lack of Transportation (Medical): No     Lack of Transportation (Non-Medical): No   Physical Activity: Insufficiently Active (9/21/2023)    Exercise Vital Sign     Days of Exercise per Week: 6 days     Minutes of Exercise per Session: 20 min   Stress: Stress Concern Present (9/21/2023)    [employer] [place] [employer] - Occupational Stress Questionnaire     Feeling of Stress : To some extent   Housing Stability: Low Risk  (9/21/2023)    Housing Stability Vital Sign     Unable to Pay for Housing in the Last Year: No     Number of Places Lived in the Last Year: 1     Unstable Housing in the Last Year: No        Current Outpatient Medications   Medication Instructions    albuterol (VENTOLIN HFA) 90 mcg/actuation inhaler 2 puffs, Inhalation, Every 6 hours PRN, Rescue    allopurinoL (ZYLOPRIM) 100 mg, Oral, 3 times daily    amLODIPine (NORVASC) 2.5 mg, Oral, Daily    bisoprolol (ZEBETA) 5 mg, Oral, Daily    BREO ELLIPTA 100-25 mcg/dose diskus inhaler 1 puff, Inhalation    calcium carbonate (OS-YAHAIRA) 600 mg, Oral, Daily    cholecalciferol (vitamin D3) (VITAMIN D3) 2,000 Units, Oral, Daily    diclofenac sodium (VOLTAREN ARTHRITIS PAIN) 2 g, Topical (Top), 4 times daily, After abrasions have healed.    doxepin (SINEQUAN) 10 MG capsule TAKE 1 CAPSULE BY MOUTH EVERY DAY    DULoxetine (CYMBALTA) 30 mg, Oral, Daily    DULoxetine  (CYMBALTA) 60 mg, Oral, Daily    fluorouraciL (EFUDEX) 0.5 g, Topical (Top), Nightly PRN    furosemide (LASIX) 20 mg, Oral, Daily    ibuprofen (ADVIL,MOTRIN) 600 mg, Oral, Every 8 hours PRN    levothyroxine (SYNTHROID) 25 mcg, Oral    LORazepam (ATIVAN) 1 mg, Oral, Nightly    meclizine (ANTIVERT) 25 mg, Oral, 3 times daily PRN    miconazole NITRATE 2 % (ZEASORB AF) 2 % top powder Topical (Top), As needed (PRN)    montelukast (SINGULAIR) 10 mg tablet TAKE 1 TABLET BY MOUTH EVERY DAY IN THE EVENING    rosuvastatin (CRESTOR) 20 mg, Oral, Daily    valACYclovir (VALTREX) 500 mg, Oral, 2 times daily       Review of patient's allergies indicates:   Allergen Reactions    Adhesive tape-silicones      Other reaction(s): blisters skin    Adhesive Rash     Other reaction(s): blisters skin        Patient Care Team:  Anthony Leslie DO as PCP - General (Family Medicine)  Yobani Serrano MD as Consulting Physician (Cardiology)  Nedra Rushing MD (Dermatology)  Mona Pedraza FNP (Endocrinology)  Anali Braun FNP (Endocrinology)  Hola Barrios MD as Consulting Physician (Orthopedic Surgery)  Low Jimenze MD as Consulting Physician (Gastroenterology)  Kenney Benitez MD as Consulting Physician (Dermatology)  Nedra Castillo MD as Consulting Physician (Rheumatology)  Robert Mendez MD as Consulting Physician (Otolaryngology)     Subjective:     Review of Systems    12 point review of systems conducted, negative except as stated in the history of present illness. See HPI for details.    Objective:     Visit Vitals  /71 (BP Location: Right arm, Patient Position: Sitting, BP Method: Large (Automatic))   Pulse 74   Temp 97.9 °F (36.6 °C)   Resp 20   Ht 5' (1.524 m)   Wt 83.6 kg (184 lb 6.4 oz)   SpO2 96%   BMI 36.01 kg/m²       Physical Exam  Vitals and nursing note reviewed.   Constitutional:       General: She is not in acute distress.     Appearance: She is not ill-appearing.   HENT:      Head:  Normocephalic and atraumatic.      Mouth/Throat:      Mouth: Mucous membranes are moist.      Pharynx: Oropharynx is clear.   Eyes:      General: No scleral icterus.     Extraocular Movements: Extraocular movements intact.      Conjunctiva/sclera: Conjunctivae normal.      Pupils: Pupils are equal, round, and reactive to light.   Neck:      Vascular: No carotid bruit.   Cardiovascular:      Rate and Rhythm: Normal rate and regular rhythm.      Heart sounds: No murmur heard.     No friction rub. No gallop.   Pulmonary:      Effort: Pulmonary effort is normal. No respiratory distress.      Breath sounds: Normal breath sounds. No wheezing, rhonchi or rales.   Abdominal:      General: Abdomen is flat. Bowel sounds are normal. There is no distension.      Palpations: Abdomen is soft. There is no mass.      Tenderness: There is no abdominal tenderness.   Genitourinary:     Exam position: Lithotomy position.      Labia:         Right: Lesion present.         Left: Lesion present.    Musculoskeletal:         General: Normal range of motion.      Cervical back: Normal range of motion and neck supple.   Skin:     General: Skin is warm and dry.      Findings: Rash present.   Neurological:      General: No focal deficit present.      Mental Status: She is alert.   Psychiatric:         Mood and Affect: Mood normal.       Labs Reviewed:     Chemistry:  Lab Results   Component Value Date     06/27/2024    K 4.4 06/27/2024    BUN 15.0 06/27/2024    CREATININE 1.13 (H) 06/27/2024    EGFRNORACEVR 51 06/27/2024    GLUCOSE 193 (H) 06/27/2024    CALCIUM 9.9 06/27/2024    ALKPHOS 78 06/27/2024    LABPROT 7.3 06/27/2024    ALBUMIN 3.9 06/27/2024    BILIDIR 0.2 04/04/2024    IBILI 0.60 04/27/2022    AST 26 06/27/2024    ALT 31 06/27/2024    MG 2.00 06/27/2024    TSH 2.466 05/01/2024    YPHYVV0IBPE 0.93 05/01/2024        Lab Results   Component Value Date    HGBA1C 5.8 06/17/2021        Hematology:  Lab Results   Component Value Date     WBC 8.83 01/17/2024    HGB 14.3 01/17/2024    HCT 43.1 01/17/2024     01/17/2024       Lipid Panel:  Lab Results   Component Value Date    CHOL 170 03/14/2024    HDL 58 03/14/2024    LDL 87.00 03/14/2024    TRIG 127 03/14/2024    TOTALCHOLEST 3 03/14/2024        Urine:  Lab Results   Component Value Date    APPEARANCEUA Clear 12/28/2022    SGUA <=1.005 12/28/2022    PROTEINUA Negative 12/28/2022    KETONESUA Negative 12/28/2022    LEUKOCYTESUR Negative 12/28/2022    RBCUA 3-5 12/28/2022    WBCUA 0-2 12/28/2022    BACTERIA Rare 12/28/2022        Assessment:       ICD-10-CM ICD-9-CM   1. Herpes simplex vulvovaginitis  A60.04 054.11   2. Stage 3a chronic kidney disease  N18.31 585.3   3. Intertrigo  L30.4 695.89        Plan:     1. Herpes simplex vulvovaginitis  Assessment & Plan:  Start Valtrex 500 mg every 12 hours x 5 days.     Encouraged the patient to notify PCP of symptoms within 24 hours of outbreak.       Orders:  -     valACYclovir (VALTREX) 500 MG tablet; Take 1 tablet (500 mg total) by mouth 2 (two) times daily. for 5 days  Dispense: 10 tablet; Refill: 0    2. Stage 3a chronic kidney disease  Assessment & Plan:  Estimated Creatinine Clearance 57.55 mL/min            3. Intertrigo  -     miconazole NITRATE 2 % (ZEASORB AF) 2 % top powder; Apply topically as needed for Itching.  Dispense: 43 g; Refill: 1       No follow-ups on file. In addition to their scheduled follow up, the patient has also been instructed to follow up on as needed basis.     Future Appointments   Date Time Provider Department Center   7/30/2024 12:15 PM Bang Anthony PT [facility]   8/1/2024 12:15 PM Bang Anthony PT [facility]   9/20/2024 10:00 AM Moo James NP [facility]        Moo James NP

## 2024-07-29 NOTE — ASSESSMENT & PLAN NOTE
Start Valtrex 500 mg every 12 hours x 5 days.     Encouraged the patient to notify PCP of symptoms within 24 hours of outbreak.

## 2024-07-30 ENCOUNTER — CLINICAL SUPPORT (OUTPATIENT)
Dept: REHABILITATION | Facility: HOSPITAL | Age: 74
End: 2024-07-30
Payer: MEDICARE

## 2024-07-30 DIAGNOSIS — R29.6 FREQUENT FALLS: Primary | ICD-10-CM

## 2024-07-30 DIAGNOSIS — Z74.09 IMPAIRED FUNCTIONAL MOBILITY, BALANCE, GAIT, AND ENDURANCE: ICD-10-CM

## 2024-07-30 PROCEDURE — 97110 THERAPEUTIC EXERCISES: CPT

## 2024-07-30 PROCEDURE — 97112 NEUROMUSCULAR REEDUCATION: CPT

## 2024-08-01 ENCOUNTER — CLINICAL SUPPORT (OUTPATIENT)
Dept: REHABILITATION | Facility: HOSPITAL | Age: 74
End: 2024-08-01
Payer: MEDICARE

## 2024-08-01 DIAGNOSIS — Z74.09 IMPAIRED FUNCTIONAL MOBILITY, BALANCE, GAIT, AND ENDURANCE: ICD-10-CM

## 2024-08-01 DIAGNOSIS — R29.6 FREQUENT FALLS: Primary | ICD-10-CM

## 2024-08-01 PROCEDURE — 97110 THERAPEUTIC EXERCISES: CPT

## 2024-08-01 NOTE — PROGRESS NOTES
"  Physical Therapy Treatment Note     Name: Kayla Wall  Clinic Number: [id number]    Therapy Diagnosis: Falls, weakness, poor balance  Physician: Moo James NP    Visit Date: 8/1/2024    Physician Orders: PT Eval and Treat  Medical Diagnosis from Referral: Falls, sequela  Evaluation Date: 7/17/2024  Authorization Period Expiration: medically necessary  Plan of Care Expiration: 10/09/2024  Visit #/Visits authorized: visit #3    Time In: 1204  Time Out: 1303  Total Billable Time: 59 minutes    Surgery: none  Orthopedic Precautions: none  Pertinent History: see medical chart    Subjective     Patient reports: Kayla reports that she has been having some right quad pain mostly but that both legs hurt. Her low back is sore as well. She agrees to her PT session.    Response to previous treatment: good    Pain: 5/10  Location: bilateral lower legs     Outcome Measure:  Therapist reviewed FOTO scores for Kayla Wall on 7/17/2024.   FOTO documents entered into [employer] - see Media section.     Eval: Patient's Physical FS Primary Measure: 45 (goal is 54 at visit #12)  Eval: Risk Adjusted Statistical FOTO: 47  Eval: Limitation Score: 55%  Eval: Category: Mobility  Eval: Activities Specific Balance Scale: 32.5% functional (lower score greater disability)    Objective     Kayla received the following treatment:     Time Activities   Manual     TherAct     TherEx 59 NuStep, knee to chest, double knee to chest, LTR, SLR, hip abd, swiss ball roll outs, heel raises/toe raise, LAQ, hip flex, hip abd, add ball squeeze, clamshell, stand hip abd, stand hip flex, mini squats, wall rail squats, 6" step ups; bilateral shld flex weighted, alt shld flex weighted, weighted horizontal abd; 3 min walk    Gait     Neuro Re-ed Not done today Balance pads for tandem stance, single leg stance   Modalities 8 Moist heat to the low back and both knees pre exercise   E-Stim     Dry Needling     Canalith Repositioning           Home Exercises " Provided and Patient Education Provided     Education provided:   - Plan of care, energy conservation, HEP     Written Home Exercises Provided: yes. Exercises were reviewed and Kayla was able to demonstrate them prior to the end of the session.  Kayla demonstrated good  understanding of the education provided. See EMR under Patient Instructions for exercises provided during therapy sessions.    Assessment     Kayla is a 73 y.o. female referred to outpatient Physical Therapy with a medical diagnosis of Falls, sequela. Patient presents with pain weakness, decreased balance, decreased functional mobility, decreased functional endurance.     Continued with mostly the same routine and added upper extremity exercises that challenges her balance as well as strength and she tolerated it pretty well. Did require some rest breaks but not as much as last time. Her left leg remains weaker than the right. She did experience hip and thigh pains with some of the exercises and with walking after a certain distance. She is deconditioned. Will work on building strength and improving exercise tolerance.     Patient prognosis is Good.      Anticipated barriers to physical therapy: none    Goals: Kayla Is progressing well towards her goals.    Short Term Goals: 6 weeks   Patient will report at least 10% disability reduction on the Balance functional outcome score to indicate clinically significant functional improvement  Patient will report at least 10 point increase on initial FOTO score to indicate clinically significant functional improvement  Patient will demonstrate a self reported outcome of 25% improvement with her balance and quality gait to allow for safe functional mobility  Patient will demonstrate 10% percent improvement on the initial assessment balance tests  Patient will report 10% improvement in being able to able to perform all household chores      Long Term Goals: 12  weeks   Patient will report at least 20%  disability reduction on the Balance functional outcome score to indicate clinically significant functional improvement  Patient will report at least 20 point increase on initial FOTO score to indicate clinically significant functional improvement  Patient will demonstrate a self reported outcome of 50% improvement with her balance and quality of gait to allow for safe functional mobility  Patient will demonstrate 20% percent improvement on the initial assessment balance tests  Patient will report 20% improvement in being able to able to perform all household          chores    Plan     Plan of care Certification: 7/17/2024 to 10/09/2024.     Outpatient Physical Therapy 2-3 times weekly for 12 weeks to include the following interventions: Gait Training, Manual Therapy, Neuromuscular Re-ed, Patient Education, Self Care, Therapeutic Activities, and Therapeutic Exercise. .     Bang Anthony, PT

## 2024-08-12 RX ORDER — LORAZEPAM 0.5 MG/1
1 TABLET ORAL NIGHTLY
Qty: 60 TABLET | Refills: 2 | Status: SHIPPED | OUTPATIENT
Start: 2024-08-12 | End: 2024-11-10

## 2024-08-13 ENCOUNTER — CLINICAL SUPPORT (OUTPATIENT)
Dept: REHABILITATION | Facility: HOSPITAL | Age: 74
End: 2024-08-13
Payer: MEDICARE

## 2024-08-13 DIAGNOSIS — Z74.09 IMPAIRED FUNCTIONAL MOBILITY, BALANCE, GAIT, AND ENDURANCE: ICD-10-CM

## 2024-08-13 DIAGNOSIS — R29.6 FREQUENT FALLS: Primary | ICD-10-CM

## 2024-08-13 PROCEDURE — 97110 THERAPEUTIC EXERCISES: CPT

## 2024-08-13 NOTE — PROGRESS NOTES
"  Physical Therapy Treatment Note     Name: Kayla Wall  Clinic Number: [id number]    Therapy Diagnosis: Falls, weakness, poor balance  Physician: Moo James NP    Visit Date: 8/13/2024    Physician Orders: PT Eval and Treat  Medical Diagnosis from Referral: Falls, sequela  Evaluation Date: 7/17/2024  Authorization Period Expiration: medically necessary  Plan of Care Expiration: 10/09/2024  Visit #/Visits authorized: visit #5    Time In: 1243  Time Out: 1347  Total Billable Time: 54 minutes    Surgery: none  Orthopedic Precautions: none  Pertinent History: see medical chart    Subjective     Patient reports: Kayla reports that she went shopping and had to walk quite a bit. Having some tiredness with low back is sore as well. She agrees to her PT session.    Response to previous treatment: good    Pain: 5/10  Location: bilateral lower legs     Outcome Measure:  Therapist reviewed FOTO scores for Kayla Wall on 7/17/2024.   FOTO documents entered into [employer] - see Media section.     Eval: Patient's Physical FS Primary Measure: 45 (goal is 54 at visit #12)  Eval: Risk Adjusted Statistical FOTO: 47  Eval: Limitation Score: 55%  Eval: Category: Mobility  Eval: Activities Specific Balance Scale: 32.5% functional (lower score greater disability)    Objective     Kayla received the following treatment:     Time Activities   Manual     TherAct     TherEx 54 NuStep, knee to chest, double knee to chest, LTR, SLR, hip abd, swiss ball roll outs, heel raises/toe raise, LAQ, hip flex, hip abd, add ball squeeze, clamshell, stand hip abd, stand hip flex, mini squats, wall rail squats, 6" step ups; bilateral shld flex weighted, alt shld flex weighted, weighted horizontal abd; 3 min walk    Gait     Neuro Re-ed Not done today Balance pads for tandem stance, single leg stance   Modalities 10/10 Moist heat to the low back and both knees pre exercise;cold pack to the low back post exercise   E-Stim     Dry Needling   "   Canalith Repositioning           Home Exercises Provided and Patient Education Provided     Education provided:   - Plan of care, energy conservation, HEP     Written Home Exercises Provided: yes. Exercises were reviewed and Kayla was able to demonstrate them prior to the end of the session.  Kayla demonstrated good  understanding of the education provided. See EMR under Patient Instructions for exercises provided during therapy sessions.    Assessment     Kayla is a 73 y.o. female referred to outpatient Physical Therapy with a medical diagnosis of Falls, sequela. Patient presents with pain weakness, decreased balance, decreased functional mobility, decreased functional endurance.     Continued with core and pelvic stabilization exercises along with the added upper extremity exercises from last time that challenges her balance and strength. Continues to require frequent rest breaks. Her left leg remains weaker than the right. She did experience hip and thigh pains with some of the exercises and with walking after a certain distance. Some knee pain with step ups. She is deconditioned. Will work on building strength and improving exercise tolerance.     Patient prognosis is Good.      Anticipated barriers to physical therapy: none    Goals: Kayla Is progressing well towards her goals.    Short Term Goals: 6 weeks   Patient will report at least 10% disability reduction on the Balance functional outcome score to indicate clinically significant functional improvement  Patient will report at least 10 point increase on initial FOTO score to indicate clinically significant functional improvement  Patient will demonstrate a self reported outcome of 25% improvement with her balance and quality gait to allow for safe functional mobility  Patient will demonstrate 10% percent improvement on the initial assessment balance tests  Patient will report 10% improvement in being able to able to perform all household chores       Long Term Goals: 12  weeks   Patient will report at least 20% disability reduction on the Balance functional outcome score to indicate clinically significant functional improvement  Patient will report at least 20 point increase on initial FOTO score to indicate clinically significant functional improvement  Patient will demonstrate a self reported outcome of 50% improvement with her balance and quality of gait to allow for safe functional mobility  Patient will demonstrate 20% percent improvement on the initial assessment balance tests  Patient will report 20% improvement in being able to able to perform all household          chores    Plan     Plan of care Certification: 7/17/2024 to 10/09/2024.     Outpatient Physical Therapy 2-3 times weekly for 12 weeks to include the following interventions: Gait Training, Manual Therapy, Neuromuscular Re-ed, Patient Education, Self Care, Therapeutic Activities, and Therapeutic Exercise. .     Bang Anthony, PT

## 2024-08-15 ENCOUNTER — CLINICAL SUPPORT (OUTPATIENT)
Dept: REHABILITATION | Facility: HOSPITAL | Age: 74
End: 2024-08-15
Payer: MEDICARE

## 2024-08-15 DIAGNOSIS — R29.6 FREQUENT FALLS: Primary | ICD-10-CM

## 2024-08-15 DIAGNOSIS — Z74.09 IMPAIRED FUNCTIONAL MOBILITY, BALANCE, GAIT, AND ENDURANCE: ICD-10-CM

## 2024-08-15 PROCEDURE — 97110 THERAPEUTIC EXERCISES: CPT | Mod: KX

## 2024-08-15 NOTE — PROGRESS NOTES
"  Physical Therapy Treatment Note     Name: Kayla Wall  Clinic Number: [id number]    Therapy Diagnosis: Falls, weakness, poor balance  Physician: Moo James NP    Visit Date: 8/15/2024    Physician Orders: PT Eval and Treat  Medical Diagnosis from Referral: Falls, sequela  Evaluation Date: 7/17/2024  Authorization Period Expiration: medically necessary  Plan of Care Expiration: 10/09/2024  Visit #/Visits authorized: visit #6    Time In: 1240  Time Out: 1335  Total Billable Time: 55 minutes    Surgery: none  Orthopedic Precautions: none  Pertinent History: see medical chart    Subjective     Patient reports: Kayla reports that she is doing better. Feels she can walk more before getting tired. Did have a fall and landed on her knees and now they are sore. Has a "big knot" on the left. She agrees to her PT session.    Response to previous treatment: good    Pain: 4/10  Location: knees    Outcome Measure:  Therapist reviewed FOTO scores for Kayla Wall on 7/17/2024.   FOTO documents entered into [employer] - see Media section.     Eval: Patient's Physical FS Primary Measure: 45 (goal is 54 at visit #12)  Eval: Risk Adjusted Statistical FOTO: 47  Eval: Limitation Score: 55%  Eval: Category: Mobility  Eval: Activities Specific Balance Scale: 32.5% functional (lower score greater disability)    8/15/2024: Patient's Physical FS Primary Measure: 50 (goal is 54 at visit #12)  Eval: Risk Adjusted Statistical FOTO: 47  8/15/2024: Limitation Score: 50%  8/15/2024: Category: Mobility  8/15/2024: Activities Specific Balance Scale: 62.5% functional (lower score greater disability)    Objective     Kayla received the following treatment:     Time Activities   Manual     TherAct     TherEx 55 NuStep, knee to chest, double knee to chest, LTR, SLR, hip abd, swiss ball roll outs, heel raises/toe raise, LAQ, hip flex, hip abd, add ball squeeze, clamshell, stand hip abd, stand hip flex, mini squats, wall rail squats, 6" step " ups; bilateral shld flex weighted, alt shld flex weighted, weighted horizontal abd; 3 min walk    Gait     Neuro Re-ed Not done today Balance pads for tandem stance, single leg stance   Modalities Not done today Moist heat to the low back and both knees pre exercise;cold pack to the low back post exercise   E-Stim     Dry Needling     Canalith Repositioning           Home Exercises Provided and Patient Education Provided     Education provided:   - Plan of care, energy conservation, HEP     Written Home Exercises Provided: yes. Exercises were reviewed and Kayla was able to demonstrate them prior to the end of the session.  Kayla demonstrated good  understanding of the education provided. See EMR under Patient Instructions for exercises provided during therapy sessions.    Assessment     Kayla is a 73 y.o. female referred to outpatient Physical Therapy with a medical diagnosis of Falls, sequela. Patient presents with pain weakness, decreased balance, decreased functional mobility, decreased functional endurance.     Kayla is improving evidenced by her score on the functional outcome tool going from 55% limitation to 50%. We continued with core and pelvic stabilization exercises along with the added upper extremity exercises as we continue to challenge her balance and strength. Less rest breaks today. No limitations during session from her knees. Left leg remains weaker than the right. No real hip or thigh pains with some of the exercises and with walking after a certain distance. She is deconditioned but tolerance today was better. Will work on building strength and improving exercise tolerance.     Patient prognosis is Good.      Anticipated barriers to physical therapy: none    Goals: Kayla Is progressing well towards her goals.    Short Term Goals: 6 weeks   Patient will report at least 10% disability reduction on the Balance functional outcome score to indicate clinically significant functional  improvement  Patient will report at least 10 point increase on initial FOTO score to indicate clinically significant functional improvement  Patient will demonstrate a self reported outcome of 25% improvement with her balance and quality gait to allow for safe functional mobility  Patient will demonstrate 10% percent improvement on the initial assessment balance tests  Patient will report 10% improvement in being able to able to perform all household chores      Long Term Goals: 12  weeks   Patient will report at least 20% disability reduction on the Balance functional outcome score to indicate clinically significant functional improvement  Patient will report at least 20 point increase on initial FOTO score to indicate clinically significant functional improvement  Patient will demonstrate a self reported outcome of 50% improvement with her balance and quality of gait to allow for safe functional mobility  Patient will demonstrate 20% percent improvement on the initial assessment balance tests  Patient will report 20% improvement in being able to able to perform all household          chores    Plan     Plan of care Certification: 7/17/2024 to 10/09/2024.     Outpatient Physical Therapy 2-3 times weekly for 12 weeks to include the following interventions: Gait Training, Manual Therapy, Neuromuscular Re-ed, Patient Education, Self Care, Therapeutic Activities, and Therapeutic Exercise. .     Bang Anthony, PT

## 2024-08-20 ENCOUNTER — CLINICAL SUPPORT (OUTPATIENT)
Dept: REHABILITATION | Facility: HOSPITAL | Age: 74
End: 2024-08-20
Payer: MEDICARE

## 2024-08-20 DIAGNOSIS — R29.6 FREQUENT FALLS: Primary | ICD-10-CM

## 2024-08-20 DIAGNOSIS — Z74.09 IMPAIRED FUNCTIONAL MOBILITY, BALANCE, GAIT, AND ENDURANCE: ICD-10-CM

## 2024-08-20 PROCEDURE — 97112 NEUROMUSCULAR REEDUCATION: CPT | Mod: KX

## 2024-08-20 PROCEDURE — 97110 THERAPEUTIC EXERCISES: CPT | Mod: KX

## 2024-08-20 NOTE — PROGRESS NOTES
"  Physical Therapy Treatment Note     Name: Kayla Wall  Clinic Number: [id number]    Therapy Diagnosis: Falls, weakness, poor balance  Physician: Moo James NP    Visit Date: 8/20/2024    Physician Orders: PT Eval and Treat  Medical Diagnosis from Referral: Falls, sequela  Evaluation Date: 7/17/2024  Authorization Period Expiration: medically necessary  Plan of Care Expiration: 10/09/2024  Visit #/Visits authorized: visit #7    Time In: 1245  Time Out: 1341  Total Billable Time: 56 minutes    Surgery: none  Orthopedic Precautions: none  Pertinent History: see medical chart    Subjective     Patient reports: Kayla reports that she was short of breath and had to use her inhaler just before coming to therapy. "Hasn't seem to have helped much." Has not had anymore falls recently. She agrees to her PT session.    Response to previous treatment: good    Pain: 4/10  Location: knees    Outcome Measure:  Therapist reviewed FOTO scores for Kayla Wall on 7/17/2024.   FOTO documents entered into [employer] - see Media section.     Eval: Patient's Physical FS Primary Measure: 45 (goal is 54 at visit #12)  Eval: Risk Adjusted Statistical FOTO: 47  Eval: Limitation Score: 55%  Eval: Category: Mobility  Eval: Activities Specific Balance Scale: 32.5% functional (lower score greater disability)    8/15/2024: Patient's Physical FS Primary Measure: 50 (goal is 54 at visit #12)  Eval: Risk Adjusted Statistical FOTO: 47  8/15/2024: Limitation Score: 50%  8/15/2024: Category: Mobility  8/15/2024: Activities Specific Balance Scale: 62.5% functional (lower score greater disability)    Objective     Kayla received the following treatment:     Time Activities   Manual     TherAct     TherEx 38 NuStep, swiss ball roll outs, heel raises/toe raise, LAQ, hip flex, hip abd, add ball squeeze, clamshell, stand hip abd, stand hip flex, mini squats, wall rail squats   Gait     Neuro Re-ed 18 Balance pads for tandem stance, single leg " stance, seated swiss ball balance   Modalities Not done today Moist heat to the low back and both knees pre exercise;cold pack to the low back post exercise   E-Stim     Dry Needling     Canalith Repositioning           Home Exercises Provided and Patient Education Provided     Education provided:   - Plan of care, energy conservation, HEP     Written Home Exercises Provided: yes. Exercises were reviewed and Kayla was able to demonstrate them prior to the end of the session.  Kayla demonstrated good  understanding of the education provided. See EMR under Patient Instructions for exercises provided during therapy sessions.    Assessment     Kayla is a 73 y.o. female referred to outpatient Physical Therapy with a medical diagnosis of Falls, sequela. Patient presents with pain weakness, decreased balance, decreased functional mobility, decreased functional endurance.     Kayla struggled a little bit today due to shortness of breath. Overall she is improving evidenced by her score on the functional outcome tool going from 55% limitation to 50%. We continued with core and pelvic stabilization exercises along with the added upper extremity exercises as we continue to challenge her balance and strength. No limitations during session from her knees but did have a couple of low back spasms. Left leg remains weaker than the right. Some right groin pain with some of the exercises and with walking after a certain distance. She is deconditioned but tolerance today was better. Will work on building strength and improving exercise tolerance.     Patient prognosis is Good.      Anticipated barriers to physical therapy: none    Goals: Kayla Is progressing well towards her goals.    Short Term Goals: 6 weeks   Patient will report at least 10% disability reduction on the Balance functional outcome score to indicate clinically significant functional improvement  Patient will report at least 10 point increase on initial FOTO score  to indicate clinically significant functional improvement  Patient will demonstrate a self reported outcome of 25% improvement with her balance and quality gait to allow for safe functional mobility  Patient will demonstrate 10% percent improvement on the initial assessment balance tests  Patient will report 10% improvement in being able to able to perform all household chores      Long Term Goals: 12  weeks   Patient will report at least 20% disability reduction on the Balance functional outcome score to indicate clinically significant functional improvement  Patient will report at least 20 point increase on initial FOTO score to indicate clinically significant functional improvement  Patient will demonstrate a self reported outcome of 50% improvement with her balance and quality of gait to allow for safe functional mobility  Patient will demonstrate 20% percent improvement on the initial assessment balance tests  Patient will report 20% improvement in being able to able to perform all household          chores    Plan     Plan of care Certification: 7/17/2024 to 10/09/2024.     Outpatient Physical Therapy 2-3 times weekly for 12 weeks to include the following interventions: Gait Training, Manual Therapy, Neuromuscular Re-ed, Patient Education, Self Care, Therapeutic Activities, and Therapeutic Exercise. .     Bang Anthony, PT

## 2024-08-22 ENCOUNTER — CLINICAL SUPPORT (OUTPATIENT)
Dept: REHABILITATION | Facility: HOSPITAL | Age: 74
End: 2024-08-22
Payer: MEDICARE

## 2024-08-22 DIAGNOSIS — Z74.09 IMPAIRED FUNCTIONAL MOBILITY, BALANCE, GAIT, AND ENDURANCE: Primary | ICD-10-CM

## 2024-08-22 PROCEDURE — 97110 THERAPEUTIC EXERCISES: CPT | Mod: KX

## 2024-08-22 PROCEDURE — 97112 NEUROMUSCULAR REEDUCATION: CPT | Mod: KX

## 2024-08-22 NOTE — PROGRESS NOTES
Physical Therapy Treatment Note     Name: Kayla Wall  Clinic Number: [id number]    Therapy Diagnosis: Falls, weakness, poor balance  Physician: Moo James NP    Visit Date: 8/22/2024    Physician Orders: PT Eval and Treat  Medical Diagnosis from Referral: Falls, sequela  Evaluation Date: 7/17/2024  Authorization Period Expiration: medically necessary  Plan of Care Expiration: 10/09/2024  Visit #/Visits authorized: visit #8    Time In: 1244  Time Out: 1340  Total Billable Time: 56 minutes    Surgery: none  Orthopedic Precautions: none  Pertinent History: see medical chart    Subjective     Patient reports: Kayla doing pretty well today but did use her inhaler just before coming to therapy. Feels like her breathing is better today. Has not had anymore falls recently. She agrees to her PT session.    Response to previous treatment: good    Pain: 4/10  Location: knees    Outcome Measure:  Therapist reviewed FOTO scores for Kayla Wall on 7/17/2024.   FOTO documents entered into "[employer] - see Media section.     Eval: Patient's Physical FS Primary Measure: 45 (goal is 54 at visit #12)  Eval: Risk Adjusted Statistical FOTO: 47  Eval: Limitation Score: 55%  Eval: Category: Mobility  Eval: Activities Specific Balance Scale: 32.5% functional (lower score greater disability)    8/15/2024: Patient's Physical FS Primary Measure: 50 (goal is 54 at visit #12)  Eval: Risk Adjusted Statistical FOTO: 47  8/15/2024: Limitation Score: 50%  8/15/2024: Category: Mobility  8/15/2024: Activities Specific Balance Scale: 62.5% functional (lower score greater disability)    Objective     Kayla received the following treatment:     Time Activities   Manual     TherAct     TherEx 38 NuStep, swiss ball roll outs, heel raises/toe raise, LAQ, hip flex, hip abd, add ball squeeze, clamshell, stand hip abd, stand hip flex, mini squats, wall rail squats; 4 min walk   Gait     Neuro Re-ed 18 Balance pads for tandem stance, single leg  stance, seated swiss ball balance   Modalities Not done today Moist heat to the low back and both knees pre exercise;cold pack to the low back post exercise   E-Stim     Dry Needling     Canalith Repositioning           Home Exercises Provided and Patient Education Provided     Education provided:   - Plan of care, energy conservation, HEP     Written Home Exercises Provided: yes. Exercises were reviewed and Kayla was able to demonstrate them prior to the end of the session.  Kayla demonstrated good  understanding of the education provided. See EMR under Patient Instructions for exercises provided during therapy sessions.    Assessment     Kayla is a 73 y.o. female referred to outpatient Physical Therapy with a medical diagnosis of Falls, sequela. Patient presents with pain weakness, decreased balance, decreased functional mobility, decreased functional endurance.     Kayla without any issues with shortness of breath today. Overall she is improving evidenced by her score on the functional outcome tool going from 55% limitation to 50%. We continued with core and pelvic stabilization exercises along with the added upper extremity exercises as we continue to challenge her balance and strength. No limitations during session from her knees but did have a couple of low back spasms. Left leg remains weaker than the right. Some right groin pain with some of the exercises and with walking after a certain distance and we did increase her walk to 4 minutes.  She is deconditioned but tolerance today was better. Will work on building strength and improving exercise tolerance.     Patient prognosis is Good.      Anticipated barriers to physical therapy: none    Goals: Kayla Is progressing well towards her goals.    Short Term Goals: 6 weeks   Patient will report at least 10% disability reduction on the Balance functional outcome score to indicate clinically significant functional improvement  Patient will report at least 10  point increase on initial FOTO score to indicate clinically significant functional improvement  Patient will demonstrate a self reported outcome of 25% improvement with her balance and quality gait to allow for safe functional mobility  Patient will demonstrate 10% percent improvement on the initial assessment balance tests  Patient will report 10% improvement in being able to able to perform all household chores      Long Term Goals: 12  weeks   Patient will report at least 20% disability reduction on the Balance functional outcome score to indicate clinically significant functional improvement  Patient will report at least 20 point increase on initial FOTO score to indicate clinically significant functional improvement  Patient will demonstrate a self reported outcome of 50% improvement with her balance and quality of gait to allow for safe functional mobility  Patient will demonstrate 20% percent improvement on the initial assessment balance tests  Patient will report 20% improvement in being able to able to perform all household          chores    Plan     Plan of care Certification: 7/17/2024 to 10/09/2024.     Outpatient Physical Therapy 2-3 times weekly for 12 weeks to include the following interventions: Gait Training, Manual Therapy, Neuromuscular Re-ed, Patient Education, Self Care, Therapeutic Activities, and Therapeutic Exercise. .     Bang Anthony, PT

## 2024-08-26 NOTE — TELEPHONE ENCOUNTER
----- Message from Moo James NP sent at 6/24/2024  2:50 PM CDT -----  Please inform patient of lab results.     1. Bilateral degenerative changes of the knee. The patient has chondrocalcinosis which is a type of arthritis that can cause flare ups and inflammation due to a buildup of calcium crystals in the joints. We treat with NSAIDs which the patient was given ibuprofen. If the pain persists, we can refer to ortho for steroid injections.     Thanks for all you do,   Moo   
Patient given results  
- hold metformin  - Lispro sliding scale

## 2024-08-27 ENCOUNTER — CLINICAL SUPPORT (OUTPATIENT)
Dept: REHABILITATION | Facility: HOSPITAL | Age: 74
End: 2024-08-27
Payer: MEDICARE

## 2024-08-27 DIAGNOSIS — R29.6 FREQUENT FALLS: ICD-10-CM

## 2024-08-27 DIAGNOSIS — Z74.09 IMPAIRED FUNCTIONAL MOBILITY, BALANCE, GAIT, AND ENDURANCE: Primary | ICD-10-CM

## 2024-08-27 PROCEDURE — 97112 NEUROMUSCULAR REEDUCATION: CPT | Mod: KX

## 2024-08-27 PROCEDURE — 97110 THERAPEUTIC EXERCISES: CPT | Mod: KX

## 2024-08-28 NOTE — PROGRESS NOTES
Physical Therapy Treatment Note     Name: Kayla Wall  Clinic Number: [id number]    Therapy Diagnosis: Falls, weakness, poor balance  Physician: Moo James NP    Visit Date: 8/27/2024    Physician Orders: PT Eval and Treat  Medical Diagnosis from Referral: Falls, sequela  Evaluation Date: 7/17/2024  Authorization Period Expiration: medically necessary  Plan of Care Expiration: 10/09/2024  Visit #/Visits authorized: visit #8    Time In: 1238  Time Out: 1338  Total Billable Time: 56 minutes    Surgery: none  Orthopedic Precautions: none  Pertinent History: see medical chart    Subjective     Patient reports: Kayla doing pretty well today and feels like her breathing is better today. Has not had anymore falls recently. Having some bilateral knee and lower back pain that she attributes to her most recent fall. She agrees to her PT session.    Response to previous treatment: good    Pain: 5/10  Location: knees, back    Outcome Measure:  Therapist reviewed FOTO scores for Kayla Wall on 7/17/2024.   FOTO documents entered into [employer] - see Media section.     Eval: Patient's Physical FS Primary Measure: 45 (goal is 54 at visit #12)  Eval: Risk Adjusted Statistical FOTO: 47  Eval: Limitation Score: 55%  Eval: Category: Mobility  Eval: Activities Specific Balance Scale: 32.5% functional (lower score greater disability)    8/15/2024: Patient's Physical FS Primary Measure: 50 (goal is 54 at visit #12)  Eval: Risk Adjusted Statistical FOTO: 47  8/15/2024: Limitation Score: 50%  8/15/2024: Category: Mobility  8/15/2024: Activities Specific Balance Scale: 62.5% functional (lower score greater disability)    Objective     Kayla received the following treatment:     Time Activities   Manual     TherAct     TherEx 38 NuStep, swiss ball roll outs, heel raises/toe raise, LAQ, hip flex, hip abd, add ball squeeze, clamshell, stand hip abd, stand hip flex, mini squats, wall rail squats; Pallof press, 4 min walk   Gait      Neuro Re-ed 18 Balance pads for tandem stance, single leg stance, seated swiss ball balance   Modalities Not done today Moist heat to the low back and both knees pre exercise;cold pack to the low back post exercise   E-Stim     Dry Needling     Canalith Repositioning           Home Exercises Provided and Patient Education Provided     Education provided:   - Plan of care, energy conservation, HEP     Written Home Exercises Provided: yes. Exercises were reviewed and Kayla was able to demonstrate them prior to the end of the session.  Kayla demonstrated good  understanding of the education provided. See EMR under Patient Instructions for exercises provided during therapy sessions.    Assessment     Kayla is a 73 y.o. female referred to outpatient Physical Therapy with a medical diagnosis of Falls, sequela. Patient presents with pain weakness, decreased balance, decreased functional mobility, decreased functional endurance.     Kayla without any issues with shortness of breath today. Overall she is improving evidenced by her score on the functional outcome tool going from 55% limitation to 50%. We continued with core and pelvic stabilization exercises along with the added upper extremity exercises as we continue to challenge her balance and strength. No limitations during session from her knees but did have a couple of low back spasms. Left leg remains weaker than the right. Some right groin pain with some of the exercises and with walking after a certain distance and we did increase her walk to 4 minutes.  She is deconditioned but tolerance today was better. Will work on building strength and improving exercise tolerance.     Patient prognosis is Good.      Anticipated barriers to physical therapy: none    Goals: Kayla Is progressing well towards her goals.    Short Term Goals: 6 weeks   Patient will report at least 10% disability reduction on the Balance functional outcome score to indicate clinically  significant functional improvement  Patient will report at least 10 point increase on initial FOTO score to indicate clinically significant functional improvement  Patient will demonstrate a self reported outcome of 25% improvement with her balance and quality gait to allow for safe functional mobility  Patient will demonstrate 10% percent improvement on the initial assessment balance tests  Patient will report 10% improvement in being able to able to perform all household chores      Long Term Goals: 12  weeks   Patient will report at least 20% disability reduction on the Balance functional outcome score to indicate clinically significant functional improvement  Patient will report at least 20 point increase on initial FOTO score to indicate clinically significant functional improvement  Patient will demonstrate a self reported outcome of 50% improvement with her balance and quality of gait to allow for safe functional mobility  Patient will demonstrate 20% percent improvement on the initial assessment balance tests  Patient will report 20% improvement in being able to able to perform all household          chores    Plan     Plan of care Certification: 7/17/2024 to 10/09/2024.     Outpatient Physical Therapy 2-3 times weekly for 12 weeks to include the following interventions: Gait Training, Manual Therapy, Neuromuscular Re-ed, Patient Education, Self Care, Therapeutic Activities, and Therapeutic Exercise. .     Bang Anthony, PT

## 2024-08-29 ENCOUNTER — CLINICAL SUPPORT (OUTPATIENT)
Dept: REHABILITATION | Facility: HOSPITAL | Age: 74
End: 2024-08-29
Payer: MEDICARE

## 2024-08-29 DIAGNOSIS — R29.6 FREQUENT FALLS: ICD-10-CM

## 2024-08-29 DIAGNOSIS — Z74.09 IMPAIRED FUNCTIONAL MOBILITY, BALANCE, GAIT, AND ENDURANCE: Primary | ICD-10-CM

## 2024-08-29 PROCEDURE — 97112 NEUROMUSCULAR REEDUCATION: CPT

## 2024-08-29 PROCEDURE — 97110 THERAPEUTIC EXERCISES: CPT

## 2024-08-29 NOTE — PROGRESS NOTES
Physical Therapy Treatment Note     Name: Kayla Wall  Clinic Number: [id number]    Therapy Diagnosis: Falls, weakness, poor balance  Physician: Moo James NP    Visit Date: 8/29/2024    Physician Orders: PT Eval and Treat  Medical Diagnosis from Referral: Falls, sequela  Evaluation Date: 7/17/2024  Authorization Period Expiration: medically necessary  Plan of Care Expiration: 10/09/2024  Visit #/Visits authorized: visit #10    Time In: 1227  Time Out: 1323  Total Billable Time: 56 minutes    Surgery: none  Orthopedic Precautions: none  Pertinent History: see medical chart    Subjective     Patient reports: Kayla feels a little stronger today. Has not had anymore falls recently. Having some bilateral knee and lower back pain that she attributes to her most recent fall. She agrees to her PT session.    Response to previous treatment: good    Pain: 3/10  Location: knees, back    Outcome Measure:  Therapist reviewed FOTO scores for Kayla Wall on 7/17/2024.   FOTO documents entered into [employer] - see Media section.     Eval: Patient's Physical FS Primary Measure: 45 (goal is 54 at visit #12)  Eval: Risk Adjusted Statistical FOTO: 47  Eval: Limitation Score: 55%  Eval: Category: Mobility  Eval: Activities Specific Balance Scale: 32.5% functional (lower score greater disability)    8/15/2024: Patient's Physical FS Primary Measure: 50 (goal is 54 at visit #12)  Eval: Risk Adjusted Statistical FOTO: 47  8/15/2024: Limitation Score: 50%  8/15/2024: Category: Mobility  8/15/2024: Activities Specific Balance Scale: 62.5% functional (lower score greater disability)    Objective     Kayla received the following treatment:     Time Activities   Manual     TherAct     TherEx 38 NuStep, swiss ball roll outs, heel raises/toe raise, LAQ, hip flex, hip abd, add ball squeeze, clamshell, stand hip abd, stand hip flex, mini squats, wall rail squats; Pallof press, 5 min walk   Gait     Neuro Re-ed 18 Balance pads for tandem  stance, single leg stance, seated swiss ball balance   Modalities Not done today Moist heat to the low back and both knees pre exercise;cold pack to the low back post exercise   E-Stim     Dry Needling     Canalith Repositioning           Home Exercises Provided and Patient Education Provided     Education provided:   - Plan of care, energy conservation, HEP     Written Home Exercises Provided: yes. Exercises were reviewed and Kayla was able to demonstrate them prior to the end of the session.  Kayla demonstrated good  understanding of the education provided. See EMR under Patient Instructions for exercises provided during therapy sessions.    Assessment     Kayla is a 73 y.o. female referred to outpatient Physical Therapy with a medical diagnosis of Falls, sequela. Patient presents with pain weakness, decreased balance, decreased functional mobility, decreased functional endurance.     Kayla without any issues with shortness of breath today. Overall she is improving evidenced by her score on the functional outcome tool going from 55% limitation to 50%. We continued with core and pelvic stabilization exercises along with the added upper extremity exercises as we continue to challenge her balance and strength. No limitations during session from her knees but did have a couple of low back spasms. Left leg remains weaker than the right. Some right groin pain with some of the exercises and with walking after a certain distance. We again increased her walk to 5 minutes and overall tolerated it pretty well.  She is deconditioned but tolerance to activity is improving. Will work on building strength and improving exercise tolerance.     Patient prognosis is Good.      Anticipated barriers to physical therapy: none    Goals: Kayla Is progressing well towards her goals.    Short Term Goals: 6 weeks   Patient will report at least 10% disability reduction on the Balance functional outcome score to indicate clinically  significant functional improvement  Patient will report at least 10 point increase on initial FOTO score to indicate clinically significant functional improvement  Patient will demonstrate a self reported outcome of 25% improvement with her balance and quality gait to allow for safe functional mobility  Patient will demonstrate 10% percent improvement on the initial assessment balance tests  Patient will report 10% improvement in being able to able to perform all household chores      Long Term Goals: 12  weeks   Patient will report at least 20% disability reduction on the Balance functional outcome score to indicate clinically significant functional improvement  Patient will report at least 20 point increase on initial FOTO score to indicate clinically significant functional improvement  Patient will demonstrate a self reported outcome of 50% improvement with her balance and quality of gait to allow for safe functional mobility  Patient will demonstrate 20% percent improvement on the initial assessment balance tests  Patient will report 20% improvement in being able to able to perform all household          chores    Plan     Plan of care Certification: 7/17/2024 to 10/09/2024.     Outpatient Physical Therapy 2-3 times weekly for 12 weeks to include the following interventions: Gait Training, Manual Therapy, Neuromuscular Re-ed, Patient Education, Self Care, Therapeutic Activities, and Therapeutic Exercise. .     Bang Anthony, PT

## 2024-08-31 DIAGNOSIS — E03.9 HYPOTHYROIDISM, UNSPECIFIED TYPE: ICD-10-CM

## 2024-08-31 DIAGNOSIS — F41.9 ANXIETY: ICD-10-CM

## 2024-09-03 ENCOUNTER — TELEPHONE (OUTPATIENT)
Dept: REHABILITATION | Facility: HOSPITAL | Age: 74
End: 2024-09-03

## 2024-09-03 RX ORDER — LEVOTHYROXINE SODIUM 25 UG/1
25 TABLET ORAL
Qty: 90 TABLET | Refills: 1 | Status: SHIPPED | OUTPATIENT
Start: 2024-09-03

## 2024-09-03 RX ORDER — DOXEPIN HYDROCHLORIDE 10 MG/1
CAPSULE ORAL
Qty: 90 CAPSULE | Refills: 1 | Status: SHIPPED | OUTPATIENT
Start: 2024-09-03

## 2024-09-03 NOTE — TELEPHONE ENCOUNTER
Patient called to request discharge from therapy at this time.     She reports she has an appointment for an echo and follow-up visits related to her ongoing difficulty breathing. Requests to discharge Physical therapy while she works on figuring out her breathing.

## 2024-09-05 ENCOUNTER — HOSPITAL ENCOUNTER (OUTPATIENT)
Dept: RADIOLOGY | Facility: HOSPITAL | Age: 74
Discharge: HOME | End: 2024-09-05
Attending: INTERNAL MEDICINE
Payer: MEDICARE

## 2024-09-05 DIAGNOSIS — I10 HTN (HYPERTENSION): ICD-10-CM

## 2024-09-05 DIAGNOSIS — R06.09 DYSPNEA ON EXERTION: ICD-10-CM

## 2024-09-05 PROCEDURE — 93306 TTE W/DOPPLER COMPLETE: CPT

## 2024-09-06 LAB
AORTIC ROOT ANNULUS: 3.06 CM
AORTIC VALVE CUSP SEPERATION: 0.89 CM
AV PEAK GRADIENT: 6 MMHG
AV REGURGITATION PRESSURE HALF TIME: 277.73 MS
CV ECHO LV RWT: 0.61 CM
DOP CALC AO PEAK VEL: 1.22 M/S
E WAVE DECELERATION TIME: 264.52 MSEC
E/A RATIO: 1.09
ECHO LV POSTERIOR WALL: 1.25 CM (ref 0.6–1.1)
FRACTIONAL SHORTENING: 33 % (ref 28–44)
INTERVENTRICULAR SEPTUM: 1.14 CM (ref 0.6–1.1)
LEFT INTERNAL DIMENSION IN SYSTOLE: 2.77 CM (ref 2.1–4)
LEFT VENTRICLE DIASTOLIC VOLUME: 74.9 ML
LEFT VENTRICLE SYSTOLIC VOLUME: 28.82 ML
LEFT VENTRICULAR INTERNAL DIMENSION IN DIASTOLE: 4.12 CM (ref 3.5–6)
LEFT VENTRICULAR MASS: 171.96 G
LVED V (TEICH): 74.9 ML
LVES V (TEICH): 28.82 ML
MV PEAK A VEL: 0.8 M/S
MV PEAK E VEL: 0.87 M/S
MV STENOSIS PRESSURE HALF TIME: 76.71 MS
MV VALVE AREA P 1/2 METHOD: 2.87 CM2
OHS CV RV/LV RATIO: 0.68 CM
OHS LV EJECTION FRACTION SIMPSONS BIPLANE MOD: 61 %
PISA AR MAX VEL: 1.89 M/S
PISA MRMAX VEL: 5.33 M/S
PISA TR MAX VEL: 2.23 M/S
PV PEAK GRADIENT: 3 MMHG
PV PEAK VELOCITY: 0.93 M/S
RA PRESSURE ESTIMATED: 3 MMHG
RIGHT VENTRICULAR END-DIASTOLIC DIMENSION: 2.79 CM
RV TB RVSP: 5 MMHG
TR MAX PG: 20 MMHG
TRICUSPID VALVE PEAK A WAVE VELOCITY: 0.46 M/S
TV PEAK E VEL: 0.5 M/S
TV REST PULMONARY ARTERY PRESSURE: 23 MMHG
TV STENOSIS PRESSURE HALF TIME: 36.11 MS
TV VALVE AREA P 1/2 METHOD: 5.26 CM2

## 2024-09-18 ENCOUNTER — LAB VISIT (OUTPATIENT)
Dept: LAB | Facility: HOSPITAL | Age: 74
End: 2024-09-18
Payer: MEDICARE

## 2024-09-18 DIAGNOSIS — I10 PRIMARY HYPERTENSION: ICD-10-CM

## 2024-09-18 DIAGNOSIS — R73.03 PREDIABETES: Primary | ICD-10-CM

## 2024-09-18 DIAGNOSIS — E78.2 MIXED HYPERLIPIDEMIA: Chronic | ICD-10-CM

## 2024-09-18 LAB
ALBUMIN SERPL-MCNC: 4.2 G/DL (ref 3.4–4.8)
ALBUMIN/GLOB SERPL: 1.3 RATIO (ref 1.1–2)
ALP SERPL-CCNC: 80 UNIT/L (ref 40–150)
ALT SERPL-CCNC: 35 UNIT/L (ref 0–55)
ANION GAP SERPL CALC-SCNC: 12 MEQ/L
AST SERPL-CCNC: 29 UNIT/L (ref 5–34)
BASOPHILS # BLD AUTO: 0.04 X10(3)/MCL
BASOPHILS NFR BLD AUTO: 0.5 %
BILIRUB SERPL-MCNC: 0.7 MG/DL
BUN SERPL-MCNC: 13 MG/DL (ref 9.8–20.1)
CALCIUM SERPL-MCNC: 10 MG/DL (ref 8.4–10.2)
CHLORIDE SERPL-SCNC: 104 MMOL/L (ref 98–107)
CHOLEST SERPL-MCNC: 162 MG/DL
CHOLEST/HDLC SERPL: 4 {RATIO} (ref 0–5)
CO2 SERPL-SCNC: 26 MMOL/L (ref 23–31)
CREAT SERPL-MCNC: 0.91 MG/DL (ref 0.55–1.02)
CREAT/UREA NIT SERPL: 14
EOSINOPHIL # BLD AUTO: 0.18 X10(3)/MCL (ref 0–0.9)
EOSINOPHIL NFR BLD AUTO: 2.1 %
ERYTHROCYTE [DISTWIDTH] IN BLOOD BY AUTOMATED COUNT: 14.1 % (ref 11.5–17)
EST. AVERAGE GLUCOSE BLD GHB EST-MCNC: 131.2 MG/DL
GFR SERPLBLD CREATININE-BSD FMLA CKD-EPI: >60 ML/MIN/1.73/M2
GLOBULIN SER-MCNC: 3.2 GM/DL (ref 2.4–3.5)
GLUCOSE SERPL-MCNC: 126 MG/DL (ref 82–115)
HBA1C MFR BLD: 6.2 %
HCT VFR BLD AUTO: 43.8 % (ref 37–47)
HDLC SERPL-MCNC: 41 MG/DL (ref 35–60)
HGB BLD-MCNC: 14.4 G/DL (ref 12–16)
IMM GRANULOCYTES # BLD AUTO: 0.02 X10(3)/MCL (ref 0–0.04)
IMM GRANULOCYTES NFR BLD AUTO: 0.2 %
LDLC SERPL CALC-MCNC: 68 MG/DL (ref 50–140)
LYMPHOCYTES # BLD AUTO: 3.68 X10(3)/MCL (ref 0.6–4.6)
LYMPHOCYTES NFR BLD AUTO: 43.9 %
MCH RBC QN AUTO: 31 PG (ref 27–31)
MCHC RBC AUTO-ENTMCNC: 32.9 G/DL (ref 33–36)
MCV RBC AUTO: 94.2 FL (ref 80–94)
MONOCYTES # BLD AUTO: 0.84 X10(3)/MCL (ref 0.1–1.3)
MONOCYTES NFR BLD AUTO: 10 %
NEUTROPHILS # BLD AUTO: 3.63 X10(3)/MCL (ref 2.1–9.2)
NEUTROPHILS NFR BLD AUTO: 43.3 %
NRBC BLD AUTO-RTO: 0 %
PLATELET # BLD AUTO: 243 X10(3)/MCL (ref 130–400)
PMV BLD AUTO: 11.1 FL (ref 7.4–10.4)
POTASSIUM SERPL-SCNC: 4 MMOL/L (ref 3.5–5.1)
PROT SERPL-MCNC: 7.4 GM/DL (ref 5.8–7.6)
RBC # BLD AUTO: 4.65 X10(6)/MCL (ref 4.2–5.4)
SODIUM SERPL-SCNC: 142 MMOL/L (ref 136–145)
TRIGL SERPL-MCNC: 263 MG/DL (ref 37–140)
VLDLC SERPL CALC-MCNC: 53 MG/DL
WBC # BLD AUTO: 8.39 X10(3)/MCL (ref 4.5–11.5)

## 2024-09-18 PROCEDURE — 80053 COMPREHEN METABOLIC PANEL: CPT

## 2024-09-18 PROCEDURE — 80061 LIPID PANEL: CPT

## 2024-09-18 PROCEDURE — 36415 COLL VENOUS BLD VENIPUNCTURE: CPT

## 2024-09-18 PROCEDURE — 85025 COMPLETE CBC W/AUTO DIFF WBC: CPT

## 2024-09-18 PROCEDURE — 83036 HEMOGLOBIN GLYCOSYLATED A1C: CPT

## 2024-09-20 ENCOUNTER — OFFICE VISIT (OUTPATIENT)
Dept: FAMILY MEDICINE | Facility: CLINIC | Age: 74
End: 2024-09-20
Payer: MEDICARE

## 2024-09-20 VITALS
TEMPERATURE: 98 F | HEIGHT: 60 IN | BODY MASS INDEX: 36.48 KG/M2 | SYSTOLIC BLOOD PRESSURE: 120 MMHG | OXYGEN SATURATION: 96 % | WEIGHT: 185.81 LBS | DIASTOLIC BLOOD PRESSURE: 67 MMHG | RESPIRATION RATE: 20 BRPM | HEART RATE: 71 BPM

## 2024-09-20 DIAGNOSIS — I34.0 MODERATE MITRAL REGURGITATION BY PRIOR ECHOCARDIOGRAM: ICD-10-CM

## 2024-09-20 DIAGNOSIS — Z00.00 MEDICARE ANNUAL WELLNESS VISIT, SUBSEQUENT: Primary | ICD-10-CM

## 2024-09-20 DIAGNOSIS — R73.03 PREDIABETES: ICD-10-CM

## 2024-09-20 DIAGNOSIS — Z72.3 LACK OF PHYSICAL ACTIVITY: ICD-10-CM

## 2024-09-20 DIAGNOSIS — N18.31 STAGE 3A CHRONIC KIDNEY DISEASE: ICD-10-CM

## 2024-09-20 DIAGNOSIS — Z00.00 ENCOUNTER FOR PREVENTIVE HEALTH EXAMINATION: ICD-10-CM

## 2024-09-20 DIAGNOSIS — E66.01 CLASS 2 SEVERE OBESITY DUE TO EXCESS CALORIES WITH SERIOUS COMORBIDITY AND BODY MASS INDEX (BMI) OF 36.0 TO 36.9 IN ADULT: ICD-10-CM

## 2024-09-20 DIAGNOSIS — I10 PRIMARY HYPERTENSION: ICD-10-CM

## 2024-09-20 DIAGNOSIS — E78.2 MIXED HYPERLIPIDEMIA: Chronic | ICD-10-CM

## 2024-09-20 PROBLEM — E66.812 CLASS 2 SEVERE OBESITY DUE TO EXCESS CALORIES WITH SERIOUS COMORBIDITY AND BODY MASS INDEX (BMI) OF 36.0 TO 36.9 IN ADULT: Status: ACTIVE | Noted: 2024-05-20
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
## 2024-09-20 RX ORDER — INSULIN PUMP SYRINGE, 3 ML
EACH MISCELLANEOUS
Qty: 1 EACH | Refills: 0 | Status: SHIPPED | OUTPATIENT
Start: 2024-09-20 | End: 2025-09-20

## 2024-09-20 RX ORDER — METFORMIN HYDROCHLORIDE 500 MG/1
500 TABLET, EXTENDED RELEASE ORAL
Qty: 30 TABLET | Refills: 2 | Status: SHIPPED | OUTPATIENT
Start: 2024-09-20 | End: 2024-12-19

## 2024-09-20 RX ORDER — LANCETS
1 EACH MISCELLANEOUS DAILY
Qty: 30 EACH | Refills: 2 | Status: SHIPPED | OUTPATIENT
Start: 2024-09-20

## 2024-09-20 NOTE — ASSESSMENT & PLAN NOTE
Lab Results   Component Value Date    EGFRNORACEVR >60 09/18/2024    EGFRNORACEVR 51 06/27/2024     Stable from renal standpoint.  Kidney function has improved.     Follow renoprotective measures including Renal Diet (reduce intake of nuts, peanut butter, milk, cheese, dried beans, peas) and Low Sodium Diet (less than 2 grams per day).  Avoid NSAIDs (Aleve, Mobic, Celebrex, Ibuprofen, Advil, Toradol and Diclofenac). May take Tylenol as needed for headache/pain.  Control prediabetes with goal A1C <7.   Stay well hydrated.

## 2024-09-20 NOTE — ASSESSMENT & PLAN NOTE
Recommended the patient follow with Dr. Serrano regarding further management.    She is scheduled to have a CPET in the near future which was ordered by pulmonology. The patient is short of breath and is still having trouble breathing.  Recommended low sodium diet.

## 2024-09-20 NOTE — PROGRESS NOTES
Internal Medicine    Kayla Wall is a 73 y.o. female here today for a Medicare Annual Wellness visit and comprehensive Health Risk Assessment.     Subjective   The following components were reviewed and updated:  Medical history  Family History  Social history  Allergies  Current Medications  Immunizations  Health Maintenance  Patient Care Team    Review of Systems  A comprehensive review of systems was conducted and is negative except as noted above.     Objective   Visit Vitals  /67 (BP Location: Left arm, Patient Position: Sitting, BP Method: Large (Automatic))   Pulse 71   Temp 97.6 °F (36.4 °C)   Resp 20   Ht 5' (1.524 m)   Wt 84.3 kg (185 lb 12.8 oz)   SpO2 96%   BMI 36.29 kg/m²        Physical Exam  Vitals and nursing note reviewed.   Constitutional:       General: She is not in acute distress.     Appearance: She is obese. She is not ill-appearing.   HENT:      Head: Normocephalic and atraumatic.      Mouth/Throat:      Mouth: Mucous membranes are moist.      Pharynx: Oropharynx is clear.   Eyes:      General: No scleral icterus.     Extraocular Movements: Extraocular movements intact.      Conjunctiva/sclera: Conjunctivae normal.      Pupils: Pupils are equal, round, and reactive to light.   Neck:      Vascular: No carotid bruit.   Cardiovascular:      Rate and Rhythm: Normal rate and regular rhythm.      Heart sounds: Murmur heard.      No friction rub. No gallop.   Pulmonary:      Effort: No respiratory distress. Prolonged expiration: dyspnea upon exertion.     Breath sounds: Normal breath sounds. No wheezing, rhonchi or rales.   Abdominal:      General: Abdomen is flat. Bowel sounds are normal. There is no distension.      Palpations: Abdomen is soft. There is no mass.      Tenderness: There is no abdominal tenderness.   Musculoskeletal:         General: Normal range of motion.      Cervical back: Normal range of motion and neck supple.      Right lower leg: No edema.      Left lower leg:  No edema.   Skin:     General: Skin is warm and dry.   Neurological:      General: No focal deficit present.      Mental Status: She is alert.   Psychiatric:         Mood and Affect: Mood normal.          Assessment/Plan:  1. Medicare annual wellness visit, subsequent    2. Encounter for preventive health examination    3. Lack of physical activity    4. Primary hypertension  Assessment & Plan:  Well controlled.   Continue Amlodipine 2.5 mg daily + Lasix 20 mg daily.   Low Sodium Diet (DASH Diet - Less than 2 grams of sodium per day).  Monitor blood pressure daily and log. Report consistent numbers greater than 140/90.  Maintain healthy weight with goal BMI <30.   Exercise as tolerated.           5. Moderate mitral regurgitation by prior echocardiogram  Assessment & Plan:  Recommended the patient follow with Dr. Serrano regarding further management.    She is scheduled to have a CPET in the near future which was ordered by pulmonology. The patient is short of breath and is still having trouble breathing.  Recommended low sodium diet.       6. Stage 3a chronic kidney disease  Assessment & Plan:  Lab Results   Component Value Date    EGFRNORACEVR >60 09/18/2024    EGFRNORACEVR 51 06/27/2024     Stable from renal standpoint.  Kidney function has improved.     Follow renoprotective measures including Renal Diet (reduce intake of nuts, peanut butter, milk, cheese, dried beans, peas) and Low Sodium Diet (less than 2 grams per day).  Avoid NSAIDs (Aleve, Mobic, Celebrex, Ibuprofen, Advil, Toradol and Diclofenac). May take Tylenol as needed for headache/pain.  Control prediabetes with goal A1C <7.   Stay well hydrated.       7. Prediabetes  Assessment & Plan:  Lab Results   Component Value Date    HGBA1C 6.2 09/18/2024    HGBA1C 5.8 06/17/2021    LDL 68.00 09/18/2024    CREATININE 0.91 09/18/2024      Start Metformin 500 mg ER daily.   Repeat A1C in three months.   Follow ADA Diet. Avoid soda, simple sweets, and limit  rice/pasta/breads/starches (no more than 45-50 grams per meal).  Maintain healthy weight with goal BMI <30.  Exercise 5 times per week for 30 minutes per day.    Orders:  -     metFORMIN (GLUCOPHAGE-XR) 500 MG ER 24hr tablet; Take 1 tablet (500 mg total) by mouth daily with breakfast.  Dispense: 30 tablet; Refill: 2  -     Hemoglobin A1C; Future; Expected date: 12/16/2024  -     blood-glucose meter kit; Use as instructed  Dispense: 1 each; Refill: 0  -     lancets (LANCETS,THIN) Misc; 1 each by Misc.(Non-Drug; Combo Route) route once daily.  Dispense: 30 each; Refill: 2  -     blood sugar diagnostic Strp; 1 each by Misc.(Non-Drug; Combo Route) route once daily.  Dispense: 30 each; Refill: 2    8. Mixed hyperlipidemia  Assessment & Plan:  Lab Results   Component Value Date    LDL 68.00 09/18/2024    TRIG 263 (H) 09/18/2024    HDL 41 09/18/2024    TOTALCHOLEST 4 09/18/2024     Continue Rosuvastatin 20 mg daily.   Continue holding Fenofibrate for now.   Stressed importance of dietary modifications. Follow a low cholesterol, low saturated fat diet with less that 200mg of cholesterol a day.  Avoid fried foods and high saturated fats (high saturated fats less than 7% of calories).  Add Flax Seed/Fish Oil supplements to diet. Increase dietary fiber.  Exercise as tolerated.     Orders:  -     Comprehensive Metabolic Panel; Future; Expected date: 12/16/2024  -     Lipid Panel; Future; Expected date: 12/16/2024    9. Class 2 severe obesity due to excess calories with serious comorbidity and body mass index (BMI) of 36.0 to 36.9 in adult  Assessment & Plan:  Body mass index is 36.29 kg/m².  Goal BMI <30.  Exercise as tolerated.   Avoid soda, simple sugars, excessive rice, potatoes or bread. Limit fast foods and fried foods.  Choose complex carbs in moderation (example: green vegetables, beans, oatmeal). Eat plenty of fresh fruits and vegetables with lean meats daily.  Do not skip meals. Eat a balanced portion size.  Consider  permanent healthy life style changes.         A comprehensive HEALTH RISK ASSESSMENT was completed today. Results are summarized below:    There are NO EMOTIONAL/SOCIAL CONCERNS identified on today's screening for Social Isolation, Depression and Anxiety.    There are NO COGNITIVE FUNCTION CONCERNS identified on today's screening.  The following FUNCTIONAL AND/OR SAFETY CONCERNS were identified on today's screening for Physical Symptoms, Nutritional, Home Safety/Living Situation, Fall Risk, Activities of Daily Living, Independent Activities of Daily Living, Physical Activity,Timed Up and Go test and Whisper test::  *Patient reports NO ROUTINE EXERCISE. (On average, how many days per week do you engage in moderate to strenuous exercise (like a brisk walk)?: (!) 0)      The patient reports NO OPIOID PRESCRIPTIONS. This was confirmed through medication reconciliation and the [facility] website.    The patient is NOT A TOBACCO USER.        All Questions regarding food, transportation or housing were not answered today.    I provided Kayla Wall with a 5-10 year written Screening Schedule per USPSTF age appropriate recommendations and a Personal Prevention Plan based on the results of today's Health Risk Assessment. Education, counseling, and referrals were provided as documented above and can be viewed in the After Visit Summary.    Follow up in about 3 months (around 12/20/2024) for PreDiabetes F/U with [facility] . In addition to this scheduled follow up, the patient has also been instructed to follow up on as needed basis.     none  The patient was asked and declined the use of a free .Advance Care Planning   Today we discussed advance care planning. Kayla Wall has advance directives written and has made no changes.

## 2024-09-20 NOTE — ASSESSMENT & PLAN NOTE
Lab Results   Component Value Date    LDL 68.00 09/18/2024    TRIG 263 (H) 09/18/2024    HDL 41 09/18/2024    TOTALCHOLEST 4 09/18/2024     Continue Rosuvastatin 20 mg daily.   Continue holding Fenofibrate for now.   Stressed importance of dietary modifications. Follow a low cholesterol, low saturated fat diet with less that 200mg of cholesterol a day.  Avoid fried foods and high saturated fats (high saturated fats less than 7% of calories).  Add Flax Seed/Fish Oil supplements to diet. Increase dietary fiber.  Exercise as tolerated.

## 2024-09-20 NOTE — PATIENT INSTRUCTIONS
Patient Education       Weight Loss Tips   About this topic   More and more people are concerned about their weight. You can choose from many different programs. The goal of a weight loss program may be to cut down on calories or to lose extra weight through exercise.  Losing weight may mean changing your ideas about food. Going on a diet and losing weight does not mean starving yourself. It means cutting down on the amount of food you eat, making healthy food choices, and being active.  General   Ideally, you need to lose 1 to 2 pounds (0.5 to 1 kg) a week for a healthy weight loss. Losing too much weight too fast is not good. When you take in fewer calories, you will lose weight. Your ideal calorie and weight goal depends on your current age, weight, height, and personal goals. Ask your doctor or dietitian what your ideal weight is.  You need to burn 3500 calories to lose 1 pound (0.5 kg). That means cutting out 500 calories every day for 7 days. You can cut out 500 calories per day by eating or drinking fewer calories, burning them through exercise, or doing both. To lose that extra weight and stay healthy:  Take time to exercise.  Exercise regularly. Burn calories with activity and exercise. Exercise can help you lose weight and it also strengthens your muscles. Set a schedule where you will have time to do exercises. With just 30 to 60 minutes of exercise each day, you could burn 500 extra calories. Your metabolism stays elevated for a period of time after exercise.  If you don't have time for a 30 minute workout, try three 10 minute exercises each day.  If you work near your home, walk to work. Walking is a very good form of exercise.  Take a 20 minute walk each day. Walk during your lunch break. Park far away, so you have to walk more.  Take the steps instead of elevators. You will burn more calories this way.  If you have an illness, like diabetes or high blood pressure, ask your doctor how much exercise is  "right for you.  Choose healthy snacks.  Low calorie healthy snacks are a good thing. They help your blood sugar stay even and prevent you from overeating at meals. Choose a balanced snack, such as a small apple with 2 tablespoons (30 grams) of peanut butter.  Keep in mind, even "low calorie" foods can add up. Just because you choose low calorie foods does not mean you do not have to count the calories you eat.  Pack a few fresh fruits or a small salad to take to work or school. Avoid buying a snack at the nearest vending machine.  When you feel thirsty, drink water. Water has no calories and is a very good thirst quencher.  Plan healthy meals.  Plan ahead. Keep a diary of foods that are low in calories. You can also make a list of meal plans for your breakfast, lunch, and dinner. Planning ahead will prevent you from eating out at a fast food place or restaurant.  Make a grocery list before shopping so you only buy food you need. Don't go to the store hungry.  Visit a dietitian. This person will help you make meal plans that will help you lose weight.  Add fiber to your meals. Adding fiber helps you to feel full for a longer amount of time.  Take care when eating out.  Choose lower fat and lower calorie meals. Try a seafood, lean meat, or vegetarian entrée.  Share a meal with a friend.  Try a salad and appetizer instead of an entree.  Ask for a to-go box when dinner is served and put half of your meal in it for a later meal.  Have fruit for dessert.  Drink water instead of other high calorie drinks.  Learn not to overeat.  Watch your portions. For example, the recommended serving size of meat is 3 ounces (90 grams). This is the size of a deck of playing cards. Two tablespoons (30 grams) of peanut butter is the size of a ping-pong ball. One medium fruit is the size of a baseball.  Use a smaller plate or glass during dinner for less calorie intake.  Try drinking a glass or two of water before eating. This may make you " feel more full and help you to eat less.  Eat slowly. Take at least 30 minutes to eat. This gives time for your brain to tell your stomach you are full. This will help you avoid overeating.  Some people eat smaller meals more often to help not to overeat. If you can eat six small meals, make them healthy and low calorie. If three meals are best for you, know your calorie level for the day and spread it out into three healthy low calorie meals.     What will the results be?   Losing weight may make you healthier. You also may have more energy for your daily activities. You may lower disease risk. You may also add years to your life.  What changes to diet are needed?   Learn how to read nutrition labels. Know the serving size. Knowing the calories in an item will help you make healthier choices and lose weight.  Keep a diary of the food you eat. This will help you count the calories you are taking in.  Make a menu in advance. This will help you make good choices to include in your diet.  Avoid eating 2 hours before bedtime to allow for digestion. If you eat right before you go to bed, you may also have worse heartburn.  Be sure to count the calories in the things you drink. You may want to stop drinking soda pop, beer, wine, and mixed drinks (alcohol). Some coffee drinks also have a lot of calories in them.  Who should use this diet?   A weight loss diet may be needed for people with a calculated body mass index of 25 and over. This means you are overweight or obese.  Who should not use this diet?   People with BMI of 18.5 or lower should not use this diet. Do not use this diet if your doctor does not recommend weight loss.  What foods are good to eat?   Choose foods that are nutritious. Remember, portion control is key. Even a low calorie food can become high in calories if you have too big of a serving. Here is a list of foods that are good to eat:  Vegetables:   Broccoli  Asparagus  Spinach  Green leafy  vegetables  Tomatoes  Onions  Mushrooms  Cucumbers  Zucchini  Lean proteins:   Egg whites  Beans including kidney, navy, black, and chickpeas  Grilled, broiled, or baked skinless chicken breast  Grilled, broiled, or baked skinless turkey breast  Lean beef  [place] meat  Grilled, broiled, or baked fish  Beans  Nuts, such as almonds, cashews, and pistachios  Seeds  Whole grains and carbs:   Oatmeal  Brown rice  Sweet potatoes  Cereal  Whole grain bread or pasta  Fruits:   Apples  Grapefruit  Blueberries  Oranges  Bananas  Grapes  Peaches  Pineapple  Strawberries  Dairy:   Fat-free or low-fat milk and cheese  Low fat yogurt  Soy, rice, or almond milk  What foods should be limited or avoided?   Limit or avoid foods that are high in calories like:  Junk foods  Fried foods  Fatty foods  Processed meats  Food with saturated and trans fat  Whole fat dairy products  Butter  Cheese  Ice cream  Food and drinks with a lot of sugar. Some examples are beer, wine, mixed drinks (alcohol), carbonated sodas, cakes, and cookies.  When do I need to call the doctor?   Weakness  Fast heartbeat  Dizziness  Helpful tips   Join a support group and an exercise group. It is much easier to lose weight if you have support and encouragement.  Do not skip meals. If you skip a meal, you most likely will overeat at that next meal.  Eat at the dining room table instead in front of the TV to help monitor intake.  Where can I learn more?   Academy of Nutrition and Dietetics  https://www.eatright.org/health/weight-loss/your-health-and-your-weight/back-to-basics-for-healthy-weight-loss   Centers for Disease Control and Prevention  https://www.cdc.gov/healthyweight/   Weight-Control Information Network  https://www.niddk.nih.gov/health-information/diet-nutrition/changing-habits-better-health   Last Reviewed Date   2021-08-09  Consumer Information Use and Disclaimer   This information is not specific medical advice and does not replace information you  receive from your health care provider. This is only a brief summary of general information. It does NOT include all information about conditions, illnesses, injuries, tests, procedures, treatments, therapies, discharge instructions or life-style choices that may apply to you. You must talk with your health care provider for complete information about your health and treatment options. This information should not be used to decide whether or not to accept your health care providers advice, instructions or recommendations. Only your health care provider has the knowledge and training to provide advice that is right for you.  Copyright   Copyright © 2021 [employer] and its affiliates and/or licensors. All rights reserved.    Patient Education       Weight Loss Tips   About this topic   More and more people are concerned about their weight. You can choose from many different programs. The goal of a weight loss program may be to cut down on calories or to lose extra weight through exercise.  Losing weight may mean changing your ideas about food. Going on a diet and losing weight does not mean starving yourself. It means cutting down on the amount of food you eat, making healthy food choices, and being active.  General   Ideally, you need to lose 1 to 2 pounds (0.5 to 1 kg) a week for a healthy weight loss. Losing too much weight too fast is not good. When you take in fewer calories, you will lose weight. Your ideal calorie and weight goal depends on your current age, weight, height, and personal goals. Ask your doctor or dietitian what your ideal weight is.  You need to burn 3500 calories to lose 1 pound (0.5 kg). That means cutting out 500 calories every day for 7 days. You can cut out 500 calories per day by eating or drinking fewer calories, burning them through exercise, or doing both. To lose that extra weight and stay healthy:  Take time to exercise.  Exercise regularly. Burn calories with activity and  "exercise. Exercise can help you lose weight and it also strengthens your muscles. Set a schedule where you will have time to do exercises. With just 30 to 60 minutes of exercise each day, you could burn 500 extra calories. Your metabolism stays elevated for a period of time after exercise.  If you don't have time for a 30 minute workout, try three 10 minute exercises each day.  If you work near your home, walk to work. Walking is a very good form of exercise.  Take a 20 minute walk each day. Walk during your lunch break. Park far away, so you have to walk more.  Take the steps instead of elevators. You will burn more calories this way.  If you have an illness, like diabetes or high blood pressure, ask your doctor how much exercise is right for you.  Choose healthy snacks.  Low calorie healthy snacks are a good thing. They help your blood sugar stay even and prevent you from overeating at meals. Choose a balanced snack, such as a small apple with 2 tablespoons (30 grams) of peanut butter.  Keep in mind, even "low calorie" foods can add up. Just because you choose low calorie foods does not mean you do not have to count the calories you eat.  Pack a few fresh fruits or a small salad to take to work or school. Avoid buying a snack at the nearest vending machine.  When you feel thirsty, drink water. Water has no calories and is a very good thirst quencher.  Plan healthy meals.  Plan ahead. Keep a diary of foods that are low in calories. You can also make a list of meal plans for your breakfast, lunch, and dinner. Planning ahead will prevent you from eating out at a fast food place or restaurant.  Make a grocery list before shopping so you only buy food you need. Don't go to the store hungry.  Visit a dietitian. This person will help you make meal plans that will help you lose weight.  Add fiber to your meals. Adding fiber helps you to feel full for a longer amount of time.  Take care when eating out.  Choose lower fat " and lower calorie meals. Try a seafood, lean meat, or vegetarian entrée.  Share a meal with a friend.  Try a salad and appetizer instead of an entree.  Ask for a to-go box when dinner is served and put half of your meal in it for a later meal.  Have fruit for dessert.  Drink water instead of other high calorie drinks.  Learn not to overeat.  Watch your portions. For example, the recommended serving size of meat is 3 ounces (90 grams). This is the size of a deck of playing cards. Two tablespoons (30 grams) of peanut butter is the size of a ping-pong ball. One medium fruit is the size of a baseball.  Use a smaller plate or glass during dinner for less calorie intake.  Try drinking a glass or two of water before eating. This may make you feel more full and help you to eat less.  Eat slowly. Take at least 30 minutes to eat. This gives time for your brain to tell your stomach you are full. This will help you avoid overeating.  Some people eat smaller meals more often to help not to overeat. If you can eat six small meals, make them healthy and low calorie. If three meals are best for you, know your calorie level for the day and spread it out into three healthy low calorie meals.     What will the results be?   Losing weight may make you healthier. You also may have more energy for your daily activities. You may lower disease risk. You may also add years to your life.  What changes to diet are needed?   Learn how to read nutrition labels. Know the serving size. Knowing the calories in an item will help you make healthier choices and lose weight.  Keep a diary of the food you eat. This will help you count the calories you are taking in.  Make a menu in advance. This will help you make good choices to include in your diet.  Avoid eating 2 hours before bedtime to allow for digestion. If you eat right before you go to bed, you may also have worse heartburn.  Be sure to count the calories in the things you drink. You may want  to stop drinking soda pop, beer, wine, and mixed drinks (alcohol). Some coffee drinks also have a lot of calories in them.  Who should use this diet?   A weight loss diet may be needed for people with a calculated body mass index of 25 and over. This means you are overweight or obese.  Who should not use this diet?   People with BMI of 18.5 or lower should not use this diet. Do not use this diet if your doctor does not recommend weight loss.  What foods are good to eat?   Choose foods that are nutritious. Remember, portion control is key. Even a low calorie food can become high in calories if you have too big of a serving. Here is a list of foods that are good to eat:  Vegetables:   Broccoli  Asparagus  Spinach  Green leafy vegetables  Tomatoes  Onions  Mushrooms  Cucumbers  Zucchini  Lean proteins:   Egg whites  Beans including kidney, navy, black, and chickpeas  Grilled, broiled, or baked skinless chicken breast  Grilled, broiled, or baked skinless turkey breast  Lean beef  [place] meat  Grilled, broiled, or baked fish  Beans  Nuts, such as almonds, cashews, and pistachios  Seeds  Whole grains and carbs:   Oatmeal  Brown rice  Sweet potatoes  Cereal  Whole grain bread or pasta  Fruits:   Apples  Grapefruit  Blueberries  Oranges  Bananas  Grapes  Peaches  Pineapple  Strawberries  Dairy:   Fat-free or low-fat milk and cheese  Low fat yogurt  Soy, rice, or almond milk  What foods should be limited or avoided?   Limit or avoid foods that are high in calories like:  Junk foods  Fried foods  Fatty foods  Processed meats  Food with saturated and trans fat  Whole fat dairy products  Butter  Cheese  Ice cream  Food and drinks with a lot of sugar. Some examples are beer, wine, mixed drinks (alcohol), carbonated sodas, cakes, and cookies.  When do I need to call the doctor?   Weakness  Fast heartbeat  Dizziness  Helpful tips   Join a support group and an exercise group. It is much easier to lose weight if you have support  and encouragement.  Do not skip meals. If you skip a meal, you most likely will overeat at that next meal.  Eat at the dining room table instead in front of the TV to help monitor intake.  Where can I learn more?   Academy of Nutrition and Dietetics  https://www.eatright.org/health/weight-loss/your-health-and-your-weight/back-to-basics-for-healthy-weight-loss   Centers for Disease Control and Prevention  https://www.cdc.gov/healthyweight/   Weight-Control Information Network  https://www.niddk.nih.gov/health-information/diet-nutrition/changing-habits-better-health   Last Reviewed Date   2021-08-09  Consumer Information Use and Disclaimer   This information is not specific medical advice and does not replace information you receive from your health care provider. This is only a brief summary of general information. It does NOT include all information about conditions, illnesses, injuries, tests, procedures, treatments, therapies, discharge instructions or life-style choices that may apply to you. You must talk with your health care provider for complete information about your health and treatment options. This information should not be used to decide whether or not to accept your health care providers advice, instructions or recommendations. Only your health care provider has the knowledge and training to provide advice that is right for you.  Copyright   Copyright © 2021 [employer] and its affiliates and/or licensors. All rights reserved.    Patient Education       Health Risks of a High BMI   About this topic   Your weight and health depend on a few things. Doctors use a method called BMI or body mass index as a tool to learn more about your risk of having health problems. This tool uses your weight and your height to find your BMI.  BMI does not include things like your habits, where you live, family history, or amount of body fat. Some people with a normal BMI are still not healthy. Other people with a high  BMI may be healthy. Most of the time, a person with a higher BMI is less healthy and will need more care. Ask your doctor for their view of your total health during a well visit or physical.  Being overweight or having a high BMI can hurt many parts of your body. Learn about your BMI and how your weight changes your health risks. Then you can make changes to keep yourself as healthy as you can.  General   Weighing too much can be very harmful to your body. It can cause many illnesses and can make it hard to move about.  Blood Sugar   You have a greater chance of having high blood sugar or diabetes if you weigh too much. Your body normally makes a hormone called insulin. The insulin allows your body to use the sugar in your blood.  The cells in your body may not be able to use insulin. Then your cells cannot get the sugar from your bloodstream that they need for energy. Your pancreas has to work extra hard to try and make enough insulin to keep your blood sugar healthy.  If you lose weight and exercise, your body is able to control your blood sugar levels better. You may not need as much insulin to keep your blood sugar levels healthy.  Your Heart   Being overweight makes your heart work harder.  The blood vessels that bring blood to your heart muscle may become narrow or blocked and cause a heart attack or your heart may not pump as well as it should. Your heart rate may not be normal.  Losing weight can lower your chances of having problems with your heart.  High Blood Pressure   Your heart has to work harder to pump blood through a larger body and to make sure all of your cells have the oxygen they need.  As your heart has to work harder, your blood pressure goes up.  Being overweight can also harm your kidneys and this may also raise your blood pressure.  You may be able to lower your blood pressure through weight loss and routine exercise.  Stroke   Strokes are more likely to happen when someone has high blood  pressure, heart problems, high blood sugar, or high cholesterol.  Being overweight puts you at a higher risk for all of these health problems. These problems put you at a higher risk for having a stroke.  Losing weight may help lower your blood pressure, which is the biggest risk factor for a stroke.  Cholesterol   Your cholesterol level is likely to be higher if you are overweight.  This can lead to narrowing of the blood vessels in your heart, neck, or other parts of your body. Then you may have chest pain or signs of low blood flow to a certain area. It can also lead to a heart attack or stroke.  Lowering your weight and changing what you eat may change your cholesterol levels.  Your Liver and Kidneys   You are more likely to have certain problems with your liver or kidneys if you have a high BMI.  Fatty liver disease is caused by a buildup of fat in your liver. Then your liver may not work as well as it should.  You are at a higher risk for diabetes if you are overweight. This illness can cause kidney problems.  There is no exact way to treat fatty liver disease. By losing weight, you may keep your liver from getting any worse and help your liver work better.  By losing weight, you lower your chance of having kidney problems. If you already have kidney problems, weight loss may help keep your disease from getting worse.  Bone and Joint Problems   Weighing too much can put a lot of stress on your joints.  The extra weight may make the cartilage wear away more quickly from your bones. Your cartilage lines the surfaces of your bones to help your joints glide more easily.  When your cartilage is worn, your joints become stiff and sore.  Losing weight and exercising is one of the best ways to treat joint pain and stiffness. It can also ease the stress on your hips, knees, and back.  Cancer Risk   Gaining weight and poor health habits raise your risk for some kinds of cancer.  Healthy eating and exercise may lower  your risk for some kinds of cancer.  Sleep   With a high BMI, you may have extra fat around your neck. This can make your airway smaller and put you at risk for a problem called sleep apnea. With this problem, your breathing starts and stops when you are sleeping.  Signs of sleep apnea include snoring, feeling sleepy during the day, and problems with focusing.  Sleep apnea can lead to heart problems such as increased risk for heart disease and arrhythmias like atrial fibrillation.  Losing weight can lower the amount of fat around your neck and ease sleep apnea problems.  Pregnancy   Your weight can affect how often you have a period. It may also make it harder for you to get pregnant. A high BMI can cause problems for both mom and baby.  If you are overweight and pregnant you are at a higher risk for high blood sugar or high blood pressure while you are pregnant.  You are also more likely to have your baby early or to need a C section.  Losing weight before you become pregnant may lower your chance for these problems. If you are pregnant, talk to your doctor before you try to lose weight.  Depression   You are more likely to suffer from depression or low mood when you have a high BMI.  You may have a low mood because of low self-esteem, lack of activity, lack of sleep, and health problems caused by being overweight.  Losing weight and finding out the reasons you overeat are some of the steps to improve your quality of life and deal with depression.  Where can I learn more?   [employer] [place] [employer] and [employer]est[employer]  https://www.niddk.nih.gov/health-information/weight-management/adult-overweight-obesity/health-risks   Last Reviewed Date   2021-09-15  Consumer Information Use and Disclaimer   This information is not specific medical advice and does not replace information you receive from your health care provider. This is only a brief summary of general information. It does NOT include all  information about conditions, illnesses, injuries, tests, procedures, treatments, therapies, discharge instructions or life-style choices that may apply to you. You must talk with your health care provider for complete information about your health and treatment options. This information should not be used to decide whether or not to accept your health care providers advice, instructions or recommendations. Only your health care provider has the knowledge and training to provide advice that is right for you.  Copyright   Copyright © 2021 [employer] and its affiliates and/or licensors. All rights reserved.    Patient Education       Exercise and Aging   General   Exercise can help older adults prevent falls and stay independent longer. Exercise may also help:  You have stronger muscles and bones and better balance  You lose weight and have more energy  Make your heart and lungs stronger  Lower your chance of health problems like heart disease, high blood sugar, or breast or colon cancer  Control conditions like high blood pressure and diabetes  You manage stress and get better sleep  Your mood, self-esteem, and self-image  How you think, plan, and pay attention  There are four types of exercise: endurance, strength, balance, and flexibility.  Endurance exercises get your heart rate up and keep it up for a while. Most people should get at least 30 minutes of exercise that gets your heart rate up on 5 or more days each week. Walking, swimming, biking, or going up and down stairs are kinds of exercises to get your heart rate up. You do not have to do all 30 minutes at one time. Try doing 10 minutes 3 times a day.  Strength exercises build muscle. Lifting weights or doing knee bends are kinds of strength exercises.  Balance exercises help to prevent falls. Raise up on your toes or stand on one leg as a kind of balance exercise.  Flexibility exercises move your joints through a full range of motion and stretch your  muscles. Bend forward in a chair to stretch your back, do stretches, or bend and extend your arms or legs as a kind of flexibility exercise. Try doing 10 minutes twice a week.  Plan to include all these exercise types in your exercise program. Always check with your doctor before you start a new exercise program.  Some people do not like formal exercise classes, but there are many ways to work your muscles each day. Here are some things you can do.  Use steps instead of elevators.  Park far away in parking lots to walk farther.  Use the time while you wait. Do knee bends as you hold onto the kitchen counter while you wait for your coffee to brew.  When you unload groceries, lift the bags or a container of milk a few times to exercise your arms and legs.  Walk the long way when you go somewhere.  After you walk to the bathroom, walk a few laps around the house before sitting down.  Try doing different standing exercises after you wash your hands each time in the bathroom.  Do balance exercises while you brush your teeth.  Do an exercise or get up and walk during TV commercials.  Don't forget to exercise small muscle groups like your hands, fingers, ankles, toes, and neck. Wiggle, bend, flex, and rotate these joints from left to right and back to front to help to keep these joints flexible.  When do I need to call the doctor?   Stop exercising and talk to your doctor if you have any of these problems:  Dizziness  Shortness of breath  Pain or pressure in the chest, arms, throat, jaw, or back  Nausea or vomiting  Blood clots  Infection  Joint swelling  Open wounds  Recent hip, back, or eye surgery  New problems that start during exercise  Helpful tips   If you have a health problem like heart disease or diabetes, talk with your doctor about the best exercises for you.  Always warm up before you stretch. Heated muscles stretch much easier than cool muscles. Try to walk or bike at an easy pace for a few minutes to warm up  your muscles.  Slow your pace again after you exercise to cool down and bring your heart rate down slowly.  Be sure you do not hold your breath when you exercise because it can raise your blood pressure. If you tend to hold your breath, try to count out loud when you exercise.  Never bounce when doing stretches. Use slow and steady motions and hold your stretch for 20 to 30 seconds.  Have a routine. Doing exercises before a meal may be a good way to get into a routine.  Set small goals for yourself when you start to exercise. Use a chart to see how much you are doing. Ask someone to exercise with you.  Exercise may be slightly uncomfortable, but you should not have sharp pains. If you do get sharp pains, stop what you are doing. If the sharp pains continue, call your doctor.  Drink plenty of fluids without caffeine when you exercise and afterwards. Avoid outdoor exercise if it is too cold or too hot.  Wear the right clothes and shoes. Try layers of clothes, so that you can take them off if you get too hot. Shoes should fit well and support your feet.  Where can I learn more?   National [place] on Aging  https://www.jose alfredo.nih.gov/health/publication/exercise-physical-activity/introduction   Last Reviewed Date   2021-03-18  Consumer Information Use and Disclaimer   This information is not specific medical advice and does not replace information you receive from your health care provider. This is only a brief summary of general information. It does NOT include all information about conditions, illnesses, injuries, tests, procedures, treatments, therapies, discharge instructions or life-style choices that may apply to you. You must talk with your health care provider for complete information about your health and treatment options. This information should not be used to decide whether or not to accept your health care providers advice, instructions or recommendations. Only your health care provider has the knowledge and  training to provide advice that is right for you.  Copyright   Copyright © 2021 [employer] and its affiliates and/or licensors. All rights reserved.

## 2024-09-20 NOTE — ASSESSMENT & PLAN NOTE
Body mass index is 36.29 kg/m².  Goal BMI <30.  Exercise as tolerated.   Avoid soda, simple sugars, excessive rice, potatoes or bread. Limit fast foods and fried foods.  Choose complex carbs in moderation (example: green vegetables, beans, oatmeal). Eat plenty of fresh fruits and vegetables with lean meats daily.  Do not skip meals. Eat a balanced portion size.  Consider permanent healthy life style changes.

## 2024-09-20 NOTE — ASSESSMENT & PLAN NOTE
Lab Results   Component Value Date    HGBA1C 6.2 09/18/2024    HGBA1C 5.8 06/17/2021    LDL 68.00 09/18/2024    CREATININE 0.91 09/18/2024      Start Metformin 500 mg ER daily.   Repeat A1C in three months.   Follow ADA Diet. Avoid soda, simple sweets, and limit rice/pasta/breads/starches (no more than 45-50 grams per meal).  Maintain healthy weight with goal BMI <30.  Exercise 5 times per week for 30 minutes per day.

## 2024-09-20 NOTE — ASSESSMENT & PLAN NOTE
Well controlled.   Continue Amlodipine 2.5 mg daily + Lasix 20 mg daily.   Low Sodium Diet (DASH Diet - Less than 2 grams of sodium per day).  Monitor blood pressure daily and log. Report consistent numbers greater than 140/90.  Maintain healthy weight with goal BMI <30.   Exercise as tolerated.

## 2024-11-15 ENCOUNTER — OFFICE VISIT (OUTPATIENT)
Dept: FAMILY MEDICINE | Facility: CLINIC | Age: 74
End: 2024-11-15
Payer: MEDICARE

## 2024-11-15 VITALS
BODY MASS INDEX: 34.95 KG/M2 | TEMPERATURE: 98 F | SYSTOLIC BLOOD PRESSURE: 128 MMHG | HEART RATE: 73 BPM | WEIGHT: 178 LBS | DIASTOLIC BLOOD PRESSURE: 67 MMHG | OXYGEN SATURATION: 95 % | HEIGHT: 60 IN | RESPIRATION RATE: 20 BRPM

## 2024-11-15 DIAGNOSIS — J45.901 ASTHMA EXACERBATION, MILD: Primary | ICD-10-CM

## 2024-11-15 DIAGNOSIS — R05.1 ACUTE COUGH: ICD-10-CM

## 2024-11-15 RX ORDER — DEXAMETHASONE SODIUM PHOSPHATE 4 MG/ML
8 INJECTION, SOLUTION INTRA-ARTICULAR; INTRALESIONAL; INTRAMUSCULAR; INTRAVENOUS; SOFT TISSUE
Status: COMPLETED | OUTPATIENT
Start: 2024-11-15 | End: 2024-11-15

## 2024-11-15 RX ORDER — FAMOTIDINE 40 MG/1
40 TABLET, FILM COATED ORAL NIGHTLY
COMMUNITY
Start: 2024-09-25

## 2024-11-15 RX ORDER — DEXAMETHASONE SODIUM PHOSPHATE 10 MG/ML
8 INJECTION INTRAMUSCULAR; INTRAVENOUS
Status: DISCONTINUED | OUTPATIENT
Start: 2024-11-15 | End: 2024-11-15

## 2024-11-15 RX ORDER — BENZONATATE 200 MG/1
200 CAPSULE ORAL 3 TIMES DAILY PRN
Qty: 30 CAPSULE | Refills: 0 | Status: SHIPPED | OUTPATIENT
Start: 2024-11-15 | End: 2024-11-25

## 2024-11-15 RX ORDER — OMEPRAZOLE 40 MG/1
40 CAPSULE, DELAYED RELEASE ORAL 2 TIMES DAILY
COMMUNITY
Start: 2024-09-03

## 2024-11-15 RX ADMIN — DEXAMETHASONE SODIUM PHOSPHATE 8 MG: 4 INJECTION, SOLUTION INTRA-ARTICULAR; INTRALESIONAL; INTRAMUSCULAR; INTRAVENOUS; SOFT TISSUE at 11:11

## 2024-11-15 NOTE — PROGRESS NOTES
Patient ID: [id number]     Chief Complaint: Cough (wheezing)    HPI:     Kayla Wall is a 74 y.o. female here today for increasing cough and wheezing that has persisted over the last week. Denies any fevers. She reports that she uses her Albuterol inhaler as needed. Breo Ellipta inhaler was discontinued by Pulmonology recently.     Past Medical History:   Diagnosis Date    Anxiety     Asthma     Basal cell carcinoma (BCC)     Bursitis     Carpal tunnel syndrome, bilateral upper limbs     Elevated diaphragm     Fatty liver     Genital herpes     GERD (gastroesophageal reflux disease)     Gout     H. pylori infection     HLD (hyperlipidemia)     HTN (hypertension)     Hypothyroidism     Impingement syndrome of right shoulder     ASHLEY (obstructive sleep apnea)     Osteoarthritis     Primary osteoarthritis of right shoulder     SOB (shortness of breath)         Past Surgical History:   Procedure Laterality Date    APPENDECTOMY  1964    BASAL CELL CARCINOMA EXCISION Left     nose - Dr Kenney Benitez    BASAL CELL CARCINOMA EXCISION Right 2023    nose - Dr Kenney Benitez    CARDIAC CATHETERIZATION  2021    Dr Yobani Serrano    CARPAL TUNNEL RELEASE Left 2022    Dr Epifanio Ivy    CARPAL TUNNEL RELEASE Right     Dr Petey Saenz     SECTION  1976     SECTION  1971     SECTION  1968    CHOLECYSTECTOMY  2016    Dr Patrick Rachana    COLONOSCOPY N/A 8/3/2023    Procedure: COLONOSCOPY;  Surgeon: Low Jimenez MD;  Location: [facility];  Service: Gastroenterology;  Laterality: N/A;    DISSECTION OF NECK      HYSTERECTOMY  1978    MYELOGRAPHY  10/22/1999    PLANTAR FASCIA RELEASE Left 2019    ROTATOR CUFF REPAIR Right 2017    Dr Kassy eHrnadez    SHOULDER ARTHROSCOPY W/ SUBACROMIAL DECOMPRESSION AND DISTAL CLAVICLE EXCISION Right 2017    Dr Kassy Hernadez    TONSILLECTOMY          Social History     Socioeconomic History    Marital  status:    Tobacco Use    Smoking status: Never    Smokeless tobacco: Never   Substance and Sexual Activity    Alcohol use: Not Currently    Drug use: Never    Sexual activity: Not Currently     Social Drivers of Health     Financial Resource Strain: Low Risk  (9/21/2023)    Overall Financial Resource Strain (CARDIA)     Difficulty of Paying Living Expenses: Not hard at all   Food Insecurity: No Food Insecurity (9/21/2023)    Hunger Vital Sign     Worried About Running Out of Food in the Last Year: Never true     Ran Out of Food in the Last Year: Never true   Transportation Needs: No Transportation Needs (9/21/2023)    PRAPARE - Transportation     Lack of Transportation (Medical): No     Lack of Transportation (Non-Medical): No   Physical Activity: Insufficiently Active (9/21/2023)    Exercise Vital Sign     Days of Exercise per Week: 6 days     Minutes of Exercise per Session: 20 min   Stress: Stress Concern Present (9/21/2023)    [place] [employer] - Occupational Stress Questionnaire     Feeling of Stress : To some extent   Housing Stability: Low Risk  (9/21/2023)    Housing Stability Vital Sign     Unable to Pay for Housing in the Last Year: No     Number of Places Lived in the Last Year: 1     Unstable Housing in the Last Year: No        Current Outpatient Medications   Medication Instructions    albuterol (VENTOLIN HFA) 90 mcg/actuation inhaler 2 puffs, Inhalation, Every 6 hours PRN, Rescue    allopurinoL (ZYLOPRIM) 100 mg, Oral, 3 times daily    amLODIPine (NORVASC) 2.5 mg, Daily    benzonatate (TESSALON) 200 mg, Oral, 3 times daily PRN    bisoprolol (ZEBETA) 5 mg, Daily    blood sugar diagnostic Strp 1 each, Misc.(Non-Drug; Combo Route), Daily    blood-glucose meter kit Use as instructed    calcium carbonate (OS-YAHAIRA) 600 mg, Daily    cholecalciferol (vitamin D3) (VITAMIN D3) 2,000 Units, Daily    diclofenac sodium (VOLTAREN ARTHRITIS PAIN) 2 g, Topical (Top), 4 times daily, After  abrasions have healed.    doxepin (SINEQUAN) 10 MG capsule TAKE 1 CAPSULE BY MOUTH EVERY DAY    DULoxetine (CYMBALTA) 30 mg, Daily    DULoxetine (CYMBALTA) 60 mg, Daily    famotidine (PEPCID) 40 mg, Nightly    fluorouraciL (EFUDEX) 0.5 g, Nightly PRN    furosemide (LASIX) 20 mg, Oral, Daily    ibuprofen (ADVIL,MOTRIN) 600 mg, Oral, Every 8 hours PRN    lancets (LANCETS,THIN) Misc 1 each, Misc.(Non-Drug; Combo Route), Daily    levothyroxine (SYNTHROID) 25 mcg, Oral    LORazepam (ATIVAN) 1 mg, Oral, Nightly    meclizine (ANTIVERT) 25 mg, Oral, 3 times daily PRN    metFORMIN (GLUCOPHAGE-XR) 500 mg, Oral, With breakfast    miconazole NITRATE 2 % (ZEASORB AF) 2 % top powder Topical (Top), As needed (PRN)    montelukast (SINGULAIR) 10 mg tablet TAKE 1 TABLET BY MOUTH EVERY DAY IN THE EVENING    omeprazole (PRILOSEC) 40 mg, 2 times daily    rosuvastatin (CRESTOR) 20 mg, Oral, Daily    valACYclovir (VALTREX) 500 mg, Oral, 2 times daily       Review of patient's allergies indicates:   Allergen Reactions    Adhesive tape-silicones      Other reaction(s): blisters skin    Adhesive Rash     Other reaction(s): blisters skin        Patient Care Team:  Anthony Leslie DO as PCP - General (Family Medicine)  Yobani Serrano MD as Consulting Physician (Cardiology)  Nedra Rushing MD (Dermatology)  Mona Pedraza FNP (Endocrinology)  Anali Braun FNP (Endocrinology)  Hola Barrios MD as Consulting Physician (Orthopedic Surgery)  Low Jimenez MD as Consulting Physician (Gastroenterology)  Kenney Benitez MD as Consulting Physician (Dermatology)  Nedra Castillo MD as Consulting Physician (Rheumatology)  Robert Mendez MD as Consulting Physician (Otolaryngology)     Subjective:     Review of Systems    12 point review of systems conducted, negative except as stated in the history of present illness. See HPI for details.    Objective:     Visit Vitals  /67 (BP Location: Right arm, Patient Position:  Sitting)   Pulse 73   Temp 98 °F (36.7 °C)   Resp 20   Ht 5' (1.524 m)   Wt 80.7 kg (178 lb)   SpO2 95%   BMI 34.76 kg/m²       Physical Exam  Vitals and nursing note reviewed.   Constitutional:       General: She is not in acute distress.     Appearance: She is not ill-appearing.   HENT:      Head: Normocephalic and atraumatic.      Right Ear: Hearing, tympanic membrane, ear canal and external ear normal.      Left Ear: Hearing, tympanic membrane, ear canal and external ear normal.      Nose: Nose normal.      Right Sinus: No maxillary sinus tenderness or frontal sinus tenderness.      Left Sinus: No maxillary sinus tenderness or frontal sinus tenderness.      Mouth/Throat:      Mouth: Mucous membranes are moist.      Pharynx: Oropharynx is clear.   Eyes:      General: No scleral icterus.     Extraocular Movements: Extraocular movements intact.      Conjunctiva/sclera: Conjunctivae normal.      Pupils: Pupils are equal, round, and reactive to light.   Neck:      Vascular: No carotid bruit.   Cardiovascular:      Rate and Rhythm: Normal rate and regular rhythm.      Heart sounds: No murmur heard.     No friction rub. No gallop.   Pulmonary:      Effort: Pulmonary effort is normal. No respiratory distress.      Breath sounds: No stridor, decreased air movement or transmitted upper airway sounds. Wheezing present. No decreased breath sounds, rhonchi or rales.   Abdominal:      General: Abdomen is flat. Bowel sounds are normal. There is no distension.      Palpations: Abdomen is soft. There is no mass.      Tenderness: There is no abdominal tenderness.   Musculoskeletal:         General: Normal range of motion.      Cervical back: Normal range of motion and neck supple.   Skin:     General: Skin is warm and dry.   Neurological:      General: No focal deficit present.      Mental Status: She is alert.   Psychiatric:         Mood and Affect: Mood normal.       Labs Reviewed:   Chemistry:  Lab Results   Component Value  Date     09/18/2024    K 4.0 09/18/2024    BUN 13.0 09/18/2024    CREATININE 0.91 09/18/2024    EGFRNORACEVR >60 09/18/2024    GLUCOSE 126 (H) 09/18/2024    CALCIUM 10.0 09/18/2024    ALKPHOS 80 09/18/2024    LABPROT 7.4 09/18/2024    ALBUMIN 4.2 09/18/2024    BILIDIR 0.2 04/04/2024    IBILI 0.60 04/27/2022    AST 29 09/18/2024    ALT 35 09/18/2024    MG 2.00 06/27/2024    TSH 2.466 05/01/2024    [id number] 0.93 05/01/2024     Assessment:       ICD-10-CM ICD-9-CM   1. Asthma exacerbation, mild  J45.901 493.92   2. Acute cough  R05.1 786.2     Plan:     1. Asthma exacerbation, mild  -     dexAMETHasone injection 8 mg    2. Acute cough  -     benzonatate (TESSALON) 200 MG capsule; Take 1 capsule (200 mg total) by mouth 3 (three) times daily as needed for Cough.  Dispense: 30 capsule; Refill: 0  -     dexAMETHasone injection 8 mg    Continue Albuterol HFA QID PRN for wheezing.   Start Tessalon Pearls 200 mg TID as needed for cough.   Dexamethasone injection given.     Avoid/limit triggers such as pollen, dust, mold and animal dander.  Avoid smoke, strong chemicals, and strong cleaning products in addition to strong perfumes/colognes.  Use rescue inhaler when needed, use nebulizer for wheezing or URI.  Report upper respiratory infections early and seek treatment.    Follow up for Keep Scheduled Appointment.. In addition to their scheduled follow up, the patient has also been instructed to follow up on as needed basis.     Future Appointments   Date Time Provider Department Center   12/20/2024 10:20 AM Moo James NP [facility] [address] [facility]   9/24/2025 10:00 AM Anthony Leslie, DO [address]         Moo James NP

## 2024-11-15 NOTE — PROGRESS NOTES
Patient ID: [id number]     Chief Complaint: Cough (wheezing)      HPI:     Kayla Wall is a 74 y.o. female here today for a follow up. No other complaints today.     Past Medical History:   Diagnosis Date    Anxiety     Asthma     Basal cell carcinoma (BCC)     Bursitis     Carpal tunnel syndrome, bilateral upper limbs     Elevated diaphragm     Fatty liver     Genital herpes     GERD (gastroesophageal reflux disease)     Gout     H. pylori infection     HLD (hyperlipidemia)     HTN (hypertension)     Hypothyroidism     Impingement syndrome of right shoulder     ASHLEY (obstructive sleep apnea)     Osteoarthritis     Primary osteoarthritis of right shoulder     SOB (shortness of breath)         Past Surgical History:   Procedure Laterality Date    APPENDECTOMY  1964    BASAL CELL CARCINOMA EXCISION Left     nose - Dr Kenney Benitez    BASAL CELL CARCINOMA EXCISION Right 2023    nose - Dr Kenney Benitez    CARDIAC CATHETERIZATION  2021    Dr Yobani Serrano    CARPAL TUNNEL RELEASE Left 2022    Dr Epifanio Ivy    CARPAL TUNNEL RELEASE Right     Dr Petey Saenz     SECTION  1976     SECTION  1971     SECTION  1968    CHOLECYSTECTOMY  2016    Dr Darnell Reich    COLONOSCOPY N/A 8/3/2023    Procedure: COLONOSCOPY;  Surgeon: Low Jimenez MD;  Location[facility];  Service: Gastroenterology;  Laterality: N/A;    DISSECTION OF NECK      HYSTERECTOMY      MYELOGRAPHY  10/22/1999    PLANTAR FASCIA RELEASE Left 2019    ROTATOR CUFF REPAIR Right 2017    Dr Kassy Hernadez    SHOULDER ARTHROSCOPY W/ SUBACROMIAL DECOMPRESSION AND DISTAL CLAVICLE EXCISION Right 2017    Dr Kassy Hernadez    TONSILLECTOMY  8        Social History     Socioeconomic History    Marital status:    Tobacco Use    Smoking status: Never    Smokeless tobacco: Never   Substance and Sexual Activity    Alcohol use:  Not Currently    Drug use: Never    Sexual activity: Not Currently     Social Drivers of Health     Financial Resource Strain: Low Risk  (9/21/2023)    Overall Financial Resource Strain (CARDIA)     Difficulty of Paying Living Expenses: Not hard at all   Food Insecurity: No Food Insecurity (9/21/2023)    Hunger Vital Sign     Worried About Running Out of Food in the Last Year: Never true     Ran Out of Food in the Last Year: Never true   Transportation Needs: No Transportation Needs (9/21/2023)    PRAPARE - Transportation     Lack of Transportation (Medical): No     Lack of Transportation (Non-Medical): No   Physical Activity: Insufficiently Active (9/21/2023)    Exercise Vital Sign     Days of Exercise per Week: 6 days     Minutes of Exercise per Session: 20 min   Stress: Stress Concern Present (9/21/2023)    [place] citizen [place] [employer] - Occupational Stress Questionnaire     Feeling of Stress : To some extent   Housing Stability: Low Risk  (9/21/2023)    Housing Stability Vital Sign     Unable to Pay for Housing in the Last Year: No     Number of Places Lived in the Last Year: 1     Unstable Housing in the Last Year: No        Current Outpatient Medications   Medication Instructions    albuterol (VENTOLIN HFA) 90 mcg/actuation inhaler 2 puffs, Inhalation, Every 6 hours PRN, Rescue    allopurinoL (ZYLOPRIM) 100 mg, Oral, 3 times daily    amLODIPine (NORVASC) 2.5 mg, Daily    bisoprolol (ZEBETA) 5 mg, Daily    blood sugar diagnostic Strp 1 each, Misc.(Non-Drug; Combo Route), Daily    blood-glucose meter kit Use as instructed    BREO ELLIPTA 100-25 mcg/dose diskus inhaler 1 puff, Inhalation    calcium carbonate (OS-YAHAIRA) 600 mg, Daily    cholecalciferol (vitamin D3) (VITAMIN D3) 2,000 Units, Daily    diclofenac sodium (VOLTAREN ARTHRITIS PAIN) 2 g, Topical (Top), 4 times daily, After abrasions have healed.    doxepin (SINEQUAN) 10 MG capsule TAKE 1 CAPSULE BY MOUTH EVERY DAY     DULoxetine (CYMBALTA) 30 mg, Daily    DULoxetine (CYMBALTA) 60 mg, Daily    famotidine (PEPCID) 40 mg, Nightly    fluorouraciL (EFUDEX) 0.5 g, Nightly PRN    furosemide (LASIX) 20 mg, Oral, Daily    ibuprofen (ADVIL,MOTRIN) 600 mg, Oral, Every 8 hours PRN    lancets (LANCETS,THIN) Misc 1 each, Misc.(Non-Drug; Combo Route), Daily    levothyroxine (SYNTHROID) 25 mcg, Oral    LORazepam (ATIVAN) 1 mg, Oral, Nightly    meclizine (ANTIVERT) 25 mg, Oral, 3 times daily PRN    metFORMIN (GLUCOPHAGE-XR) 500 mg, Oral, With breakfast    miconazole NITRATE 2 % (ZEASORB AF) 2 % top powder Topical (Top), As needed (PRN)    montelukast (SINGULAIR) 10 mg tablet TAKE 1 TABLET BY MOUTH EVERY DAY IN THE EVENING    omeprazole (PRILOSEC) 40 mg, 2 times daily    rosuvastatin (CRESTOR) 20 mg, Oral, Daily    valACYclovir (VALTREX) 500 mg, Oral, 2 times daily       Review of patient's allergies indicates:   Allergen Reactions    Adhesive tape-silicones      Other reaction(s): blisters skin    Adhesive Rash     Other reaction(s): blisters skin        Patient Care Team:  Anthony Leslie DO as PCP - General (Family Medicine)  Yobani Serrano MD as Consulting Physician (Cardiology)  Nedra Rushing MD (Dermatology)  Mona Pedraza FNP (Endocrinology)  Anali Braun FNP (Endocrinology)  Hola Barrios MD as Consulting Physician (Orthopedic Surgery)  Low Jimenez MD as Consulting Physician (Gastroenterology)  Kenney Benitez MD as Consulting Physician (Dermatology)  Nedra Castillo MD as Consulting Physician (Rheumatology)  Robert Mendez MD as Consulting Physician (Otolaryngology)     Subjective:     Review of Systems    12 point review of systems conducted, negative except as stated in the history of present illness. See HPI for details.    Objective:     Visit Vitals  /67 (BP Location: Right arm, Patient Position: Sitting)   Pulse 73   Temp 98 °F (36.7 °C)   Resp 20   Ht 5' (1.524 m)   Wt 80.7  "kg (178 lb)   SpO2 95%   BMI 34.76 kg/m²       Physical Exam    Labs Reviewed:     Chemistry:  Lab Results   Component Value Date     09/18/2024    K 4.0 09/18/2024    BUN 13.0 09/18/2024    CREATININE 0.91 09/18/2024    EGFRNORACEVR >60 09/18/2024    GLUCOSE 126 (H) 09/18/2024    CALCIUM 10.0 09/18/2024    ALKPHOS 80 09/18/2024    LABPROT 7.4 09/18/2024    ALBUMIN 4.2 09/18/2024    BILIDIR 0.2 04/04/2024    IBILI 0.60 04/27/2022    AST 29 09/18/2024    ALT 35 09/18/2024    MG 2.00 06/27/2024    TSH 2.466 05/01/2024    [id number] 0.93 05/01/2024        Lab Results   Component Value Date    HGBA1C 6.2 09/18/2024        Hematology:  Lab Results   Component Value Date    WBC 8.39 09/18/2024    HGB 14.4 09/18/2024    HCT 43.8 09/18/2024     09/18/2024       Lipid Panel:  Lab Results   Component Value Date    CHOL 162 09/18/2024    HDL 41 09/18/2024    LDL 68.00 09/18/2024    TRIG 263 (H) 09/18/2024    TOTALCHOLEST 4 09/18/2024        Urine:  Lab Results   Component Value Date    APPEARANCEUA Clear 12/28/2022    SGUA <=1.005 12/28/2022    PROTEINUA Negative 12/28/2022    KETONESUA Negative 12/28/2022    LEUKOCYTESUR Negative 12/28/2022    RBCUA 3-5 12/28/2022    WBCUA 0-2 12/28/2022    BACTERIA Rare 12/28/2022        Female Reproductive:   No results found for: "ESTRADIOL", "FSH", "PROGESTERONE"      Assessment:     No diagnosis found.     Plan:     {There are no diagnoses linked to this encounter. (Refresh or delete this SmartLink)}     No follow-ups on file. In addition to their scheduled follow up, the patient has also been instructed to follow up on as needed basis.     Future Appointments   Date Time Provider Department Center   12/20/2024 10:20 AM Moo James NP [facility] [address] [facility]   9/24/2025 10:00 AM Anthony Leslie, DO [address]         Moo James NP    "

## 2024-12-12 NOTE — PROGRESS NOTES
"  Physical Therapy Treatment Note     Name: Kayla Wall  Clinic Number: [id number]    Therapy Diagnosis: Falls, weakness, poor balance  Physician: Moo James NP    Visit Date: 7/30/2024    Physician Orders: PT Eval and Treat  Medical Diagnosis from Referral: Falls, sequela  Evaluation Date: 7/17/2024  Authorization Period Expiration: medically necessary  Plan of Care Expiration: 10/09/2024  Visit #/Visits authorized: visit #2    Time In: 1204  Time Out: 1320  Total Billable Time: 68 minutes    Surgery: none  Orthopedic Precautions: none  Pertinent History: see medical chart    Subjective     Patient reports: Kayla reports that she used her inhaler and her breathing is much better for today's session. Remains with leg pains but not as bad. She agrees to her PT session.    Response to previous treatment: good    Pain: 3/10  Location: bilateral lower legs     Outcome Measure:  Therapist reviewed FOTO scores for Kayla Wall on 7/17/2024.   FOTO documents entered into EPIC - see Media section.     Eval: Patient's Physical FS Primary Measure: 45 (goal is 54 at visit #12)  Eval: Risk Adjusted Statistical FOTO: 47  Eval: Limitation Score: 55%  Eval: Category: Mobility  Eval: Activities Specific Balance Scale: 32.5% functional (lower score greater disability)    Objective     Kayla received the following treatment:     Time Activities   Manual     TherAct     TherEx 56 NuStep, knee to chest, double knee to chest, LTR, SLR, hip abd, swiss ball roll outs, heel raises/toe raise, LAQ, hip flex, hip abd, add ball squeeze, clamshell, stand hip abd, stand hip flex, mini squats, wall rail squats, 6" step ups; 2 min walk, wall walks    Gait     Neuro Re-ed 12 Balance pads for tandem stance, single leg stance   Modalities 8 Moist heat to the low back and both knees pre exercise   E-Stim     Dry Needling     Canalith Repositioning           Home Exercises Provided and Patient Education Provided     Education provided: "   - Plan of care, energy conservation, HEP     Written Home Exercises Provided: yes. Exercises were reviewed and Kayla was able to demonstrate them prior to the end of the session.  Kayla demonstrated good  understanding of the education provided. See EMR under Patient Instructions for exercises provided during therapy sessions.    Assessment     Kayla is a 73 y.o. female referred to outpatient Physical Therapy with a medical diagnosis of Falls, sequela. Patient presents with pain weakness, decreased balance, decreased functional mobility, decreased functional endurance.     Continued with mostly the same routine but added more lower back stuff and she tolerated it pretty well. She required frequent rest breaks but was without the shortness of breath and wheezing as last time. Did some balance work on the balance pads and she struggled. Her left leg is weaker than the right. Overall she tolerated things fairly well but did experience hip pains with some of the exercises and with walking after a certain distance. She is deconditioned. Will work on building strength and improving exercise tolerance.     Patient prognosis is Good.      Anticipated barriers to physical therapy: none    Goals: Kayla Is progressing well towards her goals.    Short Term Goals: 6 weeks   Patient will report at least 10% disability reduction on the Balance functional outcome score to indicate clinically significant functional improvement  Patient will report at least 10 point increase on initial FOTO score to indicate clinically significant functional improvement  Patient will demonstrate a self reported outcome of 25% improvement with her balance and quality gait to allow for safe functional mobility  Patient will demonstrate 10% percent improvement on the initial assessment balance tests  Patient will report 10% improvement in being able to able to perform all household chores      Long Term Goals: 12  weeks   Patient will report at  least 20% disability reduction on the Balance functional outcome score to indicate clinically significant functional improvement  Patient will report at least 20 point increase on initial FOTO score to indicate clinically significant functional improvement  Patient will demonstrate a self reported outcome of 50% improvement with her balance and quality of gait to allow for safe functional mobility  Patient will demonstrate 20% percent improvement on the initial assessment balance tests  Patient will report 20% improvement in being able to able to perform all household          chores    Plan     Plan of care Certification: 7/17/2024 to 10/09/2024.     Outpatient Physical Therapy 2-3 times weekly for 12 weeks to include the following interventions: Gait Training, Manual Therapy, Neuromuscular Re-ed, Patient Education, Self Care, Therapeutic Activities, and Therapeutic Exercise. .     Bang Anthony, PT           Physical Therapy

## 2024-12-15 DIAGNOSIS — R73.03 PREDIABETES: ICD-10-CM

## 2024-12-16 RX ORDER — METFORMIN HYDROCHLORIDE 500 MG/1
500 TABLET, EXTENDED RELEASE ORAL
Qty: 90 TABLET | Refills: 0 | Status: SHIPPED | OUTPATIENT
Start: 2024-12-16 | End: 2024-12-20

## 2024-12-18 ENCOUNTER — LAB VISIT (OUTPATIENT)
Dept: LAB | Facility: HOSPITAL | Age: 74
End: 2024-12-18
Payer: MEDICARE

## 2024-12-18 DIAGNOSIS — E78.2 MIXED HYPERLIPIDEMIA: Chronic | ICD-10-CM

## 2024-12-18 DIAGNOSIS — R73.03 PREDIABETES: ICD-10-CM

## 2024-12-18 LAB
ALBUMIN SERPL-MCNC: 4.2 G/DL (ref 3.4–4.8)
ALBUMIN/GLOB SERPL: 1.3 RATIO (ref 1.1–2)
ALP SERPL-CCNC: 83 UNIT/L (ref 40–150)
ALT SERPL-CCNC: 40 UNIT/L (ref 0–55)
ANION GAP SERPL CALC-SCNC: 2 MEQ/L
AST SERPL-CCNC: 31 UNIT/L (ref 5–34)
BILIRUB SERPL-MCNC: 0.6 MG/DL
BUN SERPL-MCNC: 11 MG/DL (ref 9.8–20.1)
CALCIUM SERPL-MCNC: 10.1 MG/DL (ref 8.4–10.2)
CHLORIDE SERPL-SCNC: 99 MMOL/L (ref 98–107)
CHOLEST SERPL-MCNC: 154 MG/DL
CHOLEST/HDLC SERPL: 4 {RATIO} (ref 0–5)
CO2 SERPL-SCNC: 26 MMOL/L (ref 23–31)
CREAT SERPL-MCNC: 0.95 MG/DL (ref 0.55–1.02)
CREAT/UREA NIT SERPL: 12
EST. AVERAGE GLUCOSE BLD GHB EST-MCNC: 131.2 MG/DL
GFR SERPLBLD CREATININE-BSD FMLA CKD-EPI: >60 ML/MIN/1.73/M2
GLOBULIN SER-MCNC: 3.2 GM/DL (ref 2.4–3.5)
GLUCOSE SERPL-MCNC: 127 MG/DL (ref 82–115)
HBA1C MFR BLD: 6.2 %
HDLC SERPL-MCNC: 43 MG/DL (ref 35–60)
LDLC SERPL CALC-MCNC: 69 MG/DL (ref 50–140)
POTASSIUM SERPL-SCNC: 3.8 MMOL/L (ref 3.5–5.1)
PROT SERPL-MCNC: 7.4 GM/DL (ref 5.8–7.6)
SODIUM SERPL-SCNC: 127 MMOL/L (ref 136–145)
TRIGL SERPL-MCNC: 210 MG/DL (ref 37–140)
VLDLC SERPL CALC-MCNC: 42 MG/DL

## 2024-12-18 PROCEDURE — 80053 COMPREHEN METABOLIC PANEL: CPT

## 2024-12-18 PROCEDURE — 83036 HEMOGLOBIN GLYCOSYLATED A1C: CPT

## 2024-12-18 PROCEDURE — 36415 COLL VENOUS BLD VENIPUNCTURE: CPT

## 2024-12-18 PROCEDURE — 80061 LIPID PANEL: CPT

## 2024-12-20 ENCOUNTER — TELEPHONE (OUTPATIENT)
Dept: FAMILY MEDICINE | Facility: CLINIC | Age: 74
End: 2024-12-20

## 2024-12-20 ENCOUNTER — OFFICE VISIT (OUTPATIENT)
Dept: FAMILY MEDICINE | Facility: CLINIC | Age: 74
End: 2024-12-20
Payer: MEDICARE

## 2024-12-20 VITALS
DIASTOLIC BLOOD PRESSURE: 75 MMHG | TEMPERATURE: 98 F | HEART RATE: 68 BPM | RESPIRATION RATE: 20 BRPM | BODY MASS INDEX: 35.14 KG/M2 | OXYGEN SATURATION: 95 % | HEIGHT: 60 IN | SYSTOLIC BLOOD PRESSURE: 132 MMHG | WEIGHT: 179 LBS

## 2024-12-20 DIAGNOSIS — R73.03 PREDIABETES: ICD-10-CM

## 2024-12-20 DIAGNOSIS — R73.03 PREDIABETES: Primary | ICD-10-CM

## 2024-12-20 DIAGNOSIS — E66.09 CLASS 1 OBESITY DUE TO EXCESS CALORIES WITH SERIOUS COMORBIDITY AND BODY MASS INDEX (BMI) OF 34.0 TO 34.9 IN ADULT: ICD-10-CM

## 2024-12-20 DIAGNOSIS — E66.811 CLASS 1 OBESITY DUE TO EXCESS CALORIES WITH SERIOUS COMORBIDITY AND BODY MASS INDEX (BMI) OF 34.0 TO 34.9 IN ADULT: ICD-10-CM

## 2024-12-20 RX ORDER — TIRZEPATIDE 2.5 MG/.5ML
2.5 INJECTION, SOLUTION SUBCUTANEOUS
Qty: 2 ML | Refills: 0 | Status: SHIPPED | OUTPATIENT
Start: 2024-12-20 | End: 2025-01-19

## 2024-12-20 NOTE — PROGRESS NOTES
Patient ID: [id number]     Chief Complaint: Diabetes (3 month check up)    HPI:     Kayla Wall, a 74-year-old female, is present today for a follow-up regarding her prediabetes and to review her laboratory results. She reports experiencing some short-term memory difficulties and wishes to stop taking Metformin to determine if her symptoms will improve. She has no additional concerns at this time.    Past Medical History:   Diagnosis Date    Anxiety     Asthma     Basal cell carcinoma (BCC)     Bursitis     Carpal tunnel syndrome, bilateral upper limbs     Elevated diaphragm     Fatty liver     Genital herpes     GERD (gastroesophageal reflux disease)     Gout     H. pylori infection     HLD (hyperlipidemia)     HTN (hypertension)     Hypothyroidism     Impingement syndrome of right shoulder     ASHLEY (obstructive sleep apnea)     Osteoarthritis     Primary osteoarthritis of right shoulder     SOB (shortness of breath)         Past Surgical History:   Procedure Laterality Date    APPENDECTOMY  1964    BASAL CELL CARCINOMA EXCISION Left     nose - Dr Kenney Benitez    BASAL CELL CARCINOMA EXCISION Right 2023    nose - Dr Kenney Benitez    CARDIAC CATHETERIZATION  2021    Dr Yobani Serrano    CARPAL TUNNEL RELEASE Left 2022    Dr Epifanio Ivy    CARPAL TUNNEL RELEASE Right     Dr Petey Saenz     SECTION  1976     SECTION  1971     SECTION  1968    CHOLECYSTECTOMY  2016    Dr Patrick Rachana    COLONOSCOPY N/A 8/3/2023    Procedure: COLONOSCOPY;  Surgeon: Low Jimenez MD;  Location: [facility];  Service: Gastroenterology;  Laterality: N/A;    DISSECTION OF NECK      HYSTERECTOMY  1978    MYELOGRAPHY  10/22/1999    PLANTAR FASCIA RELEASE Left 2019    ROTATOR CUFF REPAIR Right 2017    Dr Kassy Hernadez    SHOULDER ARTHROSCOPY W/ SUBACROMIAL DECOMPRESSION AND DISTAL CLAVICLE EXCISION Right 2017    Dr Kassy Hernadez    TONSILLECTOMY   1958        Social History     Socioeconomic History    Marital status:    Tobacco Use    Smoking status: Never    Smokeless tobacco: Never   Substance and Sexual Activity    Alcohol use: Not Currently    Drug use: Never    Sexual activity: Not Currently     Social Drivers of Health     Financial Resource Strain: Low Risk  (9/21/2023)    Overall Financial Resource Strain (CARDIA)     Difficulty of Paying Living Expenses: Not hard at all   Food Insecurity: No Food Insecurity (9/21/2023)    Hunger Vital Sign     Worried About Running Out of Food in the Last Year: Never true     Ran Out of Food in the Last Year: Never true   Transportation Needs: No Transportation Needs (9/21/2023)    PRAPARE - Transportation     Lack of Transportation (Medical): No     Lack of Transportation (Non-Medical): No   Physical Activity: Insufficiently Active (9/21/2023)    Exercise Vital Sign     Days of Exercise per Week: 6 days     Minutes of Exercise per Session: 20 min   Stress: Stress Concern Present (9/21/2023)    [employer] - Occupational Stress Questionnaire     Feeling of Stress : To some extent   Housing Stability: Low Risk  (9/21/2023)    Housing Stability Vital Sign     Unable to Pay for Housing in the Last Year: No     Number of Places Lived in the Last Year: 1     Unstable Housing in the Last Year: No        Current Outpatient Medications   Medication Instructions    albuterol (VENTOLIN HFA) 90 mcg/actuation inhaler 2 puffs, Inhalation, Every 6 hours PRN, Rescue    allopurinoL (ZYLOPRIM) 100 mg, Oral, 3 times daily    amLODIPine (NORVASC) 2.5 mg, Daily    bisoprolol (ZEBETA) 5 mg, Daily    blood sugar diagnostic Strp 1 each, Misc.(Non-Drug; Combo Route), Daily    blood-glucose meter kit Use as instructed    calcium carbonate (OS-YAHAIRA) 600 mg, Daily    cholecalciferol (vitamin D3) (VITAMIN D3) 2,000 Units, Daily    doxepin (SINEQUAN) 10 MG capsule TAKE 1 CAPSULE BY MOUTH EVERY DAY     DULoxetine (CYMBALTA) 60 mg, Daily    famotidine (PEPCID) 40 mg, Nightly    furosemide (LASIX) 20 mg, Oral, Daily    ibuprofen (ADVIL,MOTRIN) 600 mg, Oral, Every 8 hours PRN    lancets (LANCETS,THIN) Misc 1 each, Misc.(Non-Drug; Combo Route), Daily    levothyroxine (SYNTHROID) 25 mcg, Oral    meclizine (ANTIVERT) 25 mg, Oral, 3 times daily PRN    montelukast (SINGULAIR) 10 mg tablet TAKE 1 TABLET BY MOUTH EVERY DAY IN THE EVENING    omeprazole (PRILOSEC) 40 mg, 2 times daily    rosuvastatin (CRESTOR) 20 mg, Oral, Daily    ZEPBOUND 2.5 mg, Subcutaneous, Every 7 days       Review of patient's allergies indicates:   Allergen Reactions    Adhesive tape-silicones      Other reaction(s): blisters skin    Adhesive Rash     Other reaction(s): blisters skin        Patient Care Team:  Anthony Leslie DO as PCP - General (Family Medicine)  Yobani Serrano MD as Consulting Physician (Cardiology)  Nedra Rushing MD (Dermatology)  Mona Pedraza FNP (Endocrinology)  Anali Braun FNP (Endocrinology)  Hola Barrios MD as Consulting Physician (Orthopedic Surgery)  Low Jimenez MD as Consulting Physician (Gastroenterology)  Kenney Benitez MD as Consulting Physician (Dermatology)  Nedra Castillo MD as Consulting Physician (Rheumatology)  Robert Mendez MD as Consulting Physician (Otolaryngology)     Subjective:     Review of Systems    12 point review of systems conducted, negative except as stated in the history of present illness. See HPI for details.    Objective:     Visit Vitals  /75 (BP Location: Right arm, Patient Position: Sitting)   Pulse 68   Temp 97.7 °F (36.5 °C)   Resp 20   Ht 5' (1.524 m)   Wt 81.2 kg (179 lb)   SpO2 95%   BMI 34.96 kg/m²       Physical Exam  Vitals and nursing note reviewed.   Constitutional:       General: She is not in acute distress.     Appearance: She is not ill-appearing.   HENT:      Head: Normocephalic and atraumatic.      Mouth/Throat:      Mouth: Mucous  membranes are moist.      Pharynx: Oropharynx is clear.   Eyes:      General: No scleral icterus.     Extraocular Movements: Extraocular movements intact.      Conjunctiva/sclera: Conjunctivae normal.      Pupils: Pupils are equal, round, and reactive to light.   Neck:      Vascular: No carotid bruit.   Cardiovascular:      Rate and Rhythm: Normal rate and regular rhythm.      Heart sounds: No murmur heard.     No friction rub. No gallop.   Pulmonary:      Effort: Pulmonary effort is normal. No respiratory distress.      Breath sounds: Normal breath sounds. No wheezing, rhonchi or rales.   Abdominal:      General: Abdomen is flat. Bowel sounds are normal. There is no distension.      Palpations: Abdomen is soft. There is no mass.      Tenderness: There is no abdominal tenderness.   Musculoskeletal:         General: Normal range of motion.      Cervical back: Normal range of motion and neck supple.   Skin:     General: Skin is warm and dry.   Neurological:      General: No focal deficit present.      Mental Status: She is alert.   Psychiatric:         Mood and Affect: Mood normal.       Labs Reviewed:   Chemistry:  Lab Results   Component Value Date     (L) 12/18/2024    K 3.8 12/18/2024    BUN 11.0 12/18/2024    CREATININE 0.95 12/18/2024    EGFRNORACEVR >60 12/18/2024    GLUCOSE 127 (H) 12/18/2024    CALCIUM 10.1 12/18/2024    ALKPHOS 83 12/18/2024    LABPROT 7.4 12/18/2024    ALBUMIN 4.2 12/18/2024    BILIDIR 0.2 04/04/2024    IBILI 0.60 04/27/2022    AST 31 12/18/2024    ALT 40 12/18/2024    MG 2.00 06/27/2024    TSH 2.466 05/01/2024    LDZEQS0CFGP 0.93 05/01/2024        Lab Results   Component Value Date    HGBA1C 6.2 12/18/2024        Hematology:  Lab Results   Component Value Date    WBC 8.39 09/18/2024    HGB 14.4 09/18/2024    HCT 43.8 09/18/2024     09/18/2024       Lipid Panel:  Lab Results   Component Value Date    CHOL 154 12/18/2024    HDL 43 12/18/2024    LDL 69.00 12/18/2024    TRIG 210  (H) 12/18/2024    TOTALCHOLEST 4 12/18/2024        Urine:  Lab Results   Component Value Date    APPEARANCEUA Clear 12/28/2022    SGUA <=1.005 12/28/2022    PROTEINUA Negative 12/28/2022    KETONESUA Negative 12/28/2022    LEUKOCYTESUR Negative 12/28/2022    RBCUA 3-5 12/28/2022    WBCUA 0-2 12/28/2022    BACTERIA Rare 12/28/2022      Assessment:       ICD-10-CM ICD-9-CM   1. Prediabetes  R73.03 790.29   2. Class 1 obesity due to excess calories with serious comorbidity and body mass index (BMI) of 34.0 to 34.9 in adult  E66.811 278.00    E66.09 V85.34    Z68.34      Plan:     1. Prediabetes  Assessment & Plan:  Lab Results   Component Value Date    HGBA1C 6.2 12/18/2024    HGBA1C 6.2 09/18/2024    LDL 69.00 12/18/2024    CREATININE 0.95 12/18/2024     Discontinue Metformin 500 mg daily.   Start Zepbound 2.5 mg every 7 days.   RTC in 1 month.     - Engaged in a conversation with the patient regarding potential risks of Zepbound including muscle atrophy.  - Encouraged developing an exercise regimen emphasizing weight-bearing activities to help maintain lean muscle mass.  - Emphasized the importance of hydration, recommending a daily intake of 73 to 100 ounces of water.  - Informed the patient about the possibility of experiencing headaches on injection days, reiterating the need for adequate hydration.  - Discussed dietary adjustments to mitigate nausea, advising smaller meal portions and the avoidance of fried, fatty foods, and high-sugar items.  - Discussed the risk of kidney damage resulting from dehydration.    -Mentioned the potential for pancreatitis.    - Warned about the risk of hypoglycemia or episodes of low blood sugar.    - Informed the patient about the possibility of a thyroid tumor and established a plan for self-examination, encouraging them to report any new nodules.      Follow ADA Diet. Avoid soda, simple sweets, and limit rice/pasta/breads/starches (no more than 45-50 grams per meal).  Maintain  healthy weight with goal BMI <30.  Exercise 5 times per week for 30 minutes per day.    Orders:  -     tirzepatide, weight loss, (ZEPBOUND) 2.5 mg/0.5 mL PnIj; Inject 2.5 mg into the skin every 7 days.  Dispense: 2 mL; Refill: 0    2. Class 1 obesity due to excess calories with serious comorbidity and body mass index (BMI) of 34.0 to 34.9 in adult  -     tirzepatide, weight loss, (ZEPBOUND) 2.5 mg/0.5 mL PnIj; Inject 2.5 mg into the skin every 7 days.  Dispense: 2 mL; Refill: 0      Follow up in about 1 month (around 1/20/2025) for Prediabetes Follow Up. In addition to their scheduled follow up, the patient has also been instructed to follow up on as needed basis.     Future Appointments   Date Time Provider Department Center   1/21/2025 10:20 AM Moo James NP [facility] [address] [facility]   9/24/2025 10:00 AM Anthony Leslie DO [facility]      Moo James NP

## 2024-12-20 NOTE — ASSESSMENT & PLAN NOTE
Lab Results   Component Value Date    HGBA1C 6.2 12/18/2024    HGBA1C 6.2 09/18/2024    LDL 69.00 12/18/2024    CREATININE 0.95 12/18/2024     Discontinue Metformin 500 mg daily.   Start Zepbound 2.5 mg every 7 days.   RTC in 1 month.     - Engaged in a conversation with the patient regarding potential risks of Zepbound including muscle atrophy.  - Encouraged developing an exercise regimen emphasizing weight-bearing activities to help maintain lean muscle mass.  - Emphasized the importance of hydration, recommending a daily intake of 73 to 100 ounces of water.  - Informed the patient about the possibility of experiencing headaches on injection days, reiterating the need for adequate hydration.  - Discussed dietary adjustments to mitigate nausea, advising smaller meal portions and the avoidance of fried, fatty foods, and high-sugar items.  - Discussed the risk of kidney damage resulting from dehydration.    -Mentioned the potential for pancreatitis.    - Warned about the risk of hypoglycemia or episodes of low blood sugar.    - Informed the patient about the possibility of a thyroid tumor and established a plan for self-examination, encouraging them to report any new nodules.      Follow ADA Diet. Avoid soda, simple sweets, and limit rice/pasta/breads/starches (no more than 45-50 grams per meal).  Maintain healthy weight with goal BMI <30.  Exercise 5 times per week for 30 minutes per day.

## 2024-12-20 NOTE — TELEPHONE ENCOUNTER
----- Message from Moo James NP sent at 12/18/2024  1:08 PM CST -----  Please inform patient of lab results.     1. Hyponatremia- Is she taking her Lasix daily? If so, please decrease Lasix to 4 times per  week.     2. Kidney function is normal.     3. Triglycerides are improving.     4. A1C is 6.2 and is unchanged from 3 months ago.     We can discuss labs at upcoming appointment.      Thanks for all you do,   Moo

## 2024-12-23 ENCOUNTER — TELEPHONE (OUTPATIENT)
Dept: FAMILY MEDICINE | Facility: CLINIC | Age: 74
End: 2024-12-23
Payer: MEDICARE

## 2024-12-23 DIAGNOSIS — R73.03 PREDIABETES: ICD-10-CM

## 2024-12-23 DIAGNOSIS — I10 PRIMARY HYPERTENSION: ICD-10-CM

## 2024-12-23 DIAGNOSIS — E66.811 CLASS 1 OBESITY DUE TO EXCESS CALORIES WITH SERIOUS COMORBIDITY AND BODY MASS INDEX (BMI) OF 34.0 TO 34.9 IN ADULT: ICD-10-CM

## 2024-12-23 DIAGNOSIS — G47.33 MODERATE OBSTRUCTIVE SLEEP APNEA: Primary | ICD-10-CM

## 2024-12-23 DIAGNOSIS — E66.09 CLASS 1 OBESITY DUE TO EXCESS CALORIES WITH SERIOUS COMORBIDITY AND BODY MASS INDEX (BMI) OF 34.0 TO 34.9 IN ADULT: ICD-10-CM

## 2024-12-23 DIAGNOSIS — R06.02 SOB (SHORTNESS OF BREATH): ICD-10-CM

## 2024-12-23 RX ORDER — TIRZEPATIDE 2.5 MG/.5ML
INJECTION, SOLUTION SUBCUTANEOUS
Refills: 0 | OUTPATIENT
Start: 2024-12-23

## 2024-12-23 RX ORDER — TIRZEPATIDE 2.5 MG/.5ML
2.5 INJECTION, SOLUTION SUBCUTANEOUS
Qty: 2 ML | Refills: 0 | Status: SHIPPED | OUTPATIENT
Start: 2024-12-23 | End: 2024-12-26

## 2024-12-23 RX ORDER — FUROSEMIDE 20 MG/1
20 TABLET ORAL DAILY
Qty: 30 TABLET | Refills: 2 | Status: SHIPPED | OUTPATIENT
Start: 2024-12-23 | End: 2025-03-23

## 2024-12-23 NOTE — TELEPHONE ENCOUNTER
The FDA approved Zepbound for moderate ASHLEY which the patient has. Resubmitted under this code. Notified the patient.

## 2024-12-26 ENCOUNTER — TELEPHONE (OUTPATIENT)
Dept: FAMILY MEDICINE | Facility: CLINIC | Age: 74
End: 2024-12-26
Payer: MEDICARE

## 2024-12-26 DIAGNOSIS — E66.09 CLASS 1 OBESITY DUE TO EXCESS CALORIES WITH SERIOUS COMORBIDITY AND BODY MASS INDEX (BMI) OF 34.0 TO 34.9 IN ADULT: ICD-10-CM

## 2024-12-26 DIAGNOSIS — E66.811 CLASS 1 OBESITY DUE TO EXCESS CALORIES WITH SERIOUS COMORBIDITY AND BODY MASS INDEX (BMI) OF 34.0 TO 34.9 IN ADULT: ICD-10-CM

## 2024-12-26 DIAGNOSIS — R73.03 PREDIABETES: ICD-10-CM

## 2024-12-26 DIAGNOSIS — G47.33 MODERATE OBSTRUCTIVE SLEEP APNEA: ICD-10-CM

## 2024-12-26 RX ORDER — TIRZEPATIDE 2.5 MG/.5ML
2.5 INJECTION, SOLUTION SUBCUTANEOUS
Qty: 2 ML | Refills: 0 | Status: SHIPPED | OUTPATIENT
Start: 2024-12-26 | End: 2025-01-25

## 2024-12-26 RX ORDER — TIRZEPATIDE 2.5 MG/.5ML
2.5 INJECTION, SOLUTION SUBCUTANEOUS
Qty: 2 ML | Refills: 0 | Status: SHIPPED | OUTPATIENT
Start: 2024-12-26 | End: 2024-12-26

## 2024-12-26 NOTE — TELEPHONE ENCOUNTER
Patient called stating the injection is $1000 a month for zepbound and would like to discuss this with you please give her a call.

## 2024-12-26 NOTE — TELEPHONE ENCOUNTER
Spoke with patient informing her the pharmacy should be contacting her and to let us know if she does not hear from them by monday

## 2024-12-26 NOTE — TELEPHONE ENCOUNTER
Resent Medication through [facility].     If they allow you to, please call the pharmacy and provide this information:     NPI: [id number]  NCPDP: [id number]           Also, please ask if they will reach out to the patient for payment or does she have to call them?

## 2025-01-13 DIAGNOSIS — E78.2 MIXED HYPERLIPIDEMIA: Chronic | ICD-10-CM

## 2025-01-13 RX ORDER — ROSUVASTATIN CALCIUM 20 MG/1
20 TABLET, COATED ORAL
Qty: 90 TABLET | Refills: 3 | Status: SHIPPED | OUTPATIENT
Start: 2025-01-13

## 2025-01-20 DIAGNOSIS — R73.03 PREDIABETES: ICD-10-CM

## 2025-01-23 RX ORDER — TIRZEPATIDE 2.5 MG/.5ML
2.5 INJECTION, SOLUTION SUBCUTANEOUS
Qty: 2 ML | Refills: 0 | Status: SHIPPED | OUTPATIENT
Start: 2025-01-23 | End: 2025-01-29

## 2025-01-23 RX ORDER — TIRZEPATIDE 2.5 MG/.5ML
INJECTION, SOLUTION SUBCUTANEOUS
Qty: 2 ML | Refills: 0 | OUTPATIENT
Start: 2025-01-23

## 2025-01-29 ENCOUNTER — OFFICE VISIT (OUTPATIENT)
Dept: FAMILY MEDICINE | Facility: CLINIC | Age: 75
End: 2025-01-29
Payer: MEDICARE

## 2025-01-29 VITALS
BODY MASS INDEX: 33.7 KG/M2 | WEIGHT: 171.63 LBS | HEART RATE: 72 BPM | OXYGEN SATURATION: 96 % | DIASTOLIC BLOOD PRESSURE: 60 MMHG | HEIGHT: 60 IN | TEMPERATURE: 98 F | RESPIRATION RATE: 20 BRPM | SYSTOLIC BLOOD PRESSURE: 125 MMHG

## 2025-01-29 DIAGNOSIS — E78.2 MIXED HYPERLIPIDEMIA: Chronic | ICD-10-CM

## 2025-01-29 DIAGNOSIS — E03.9 HYPOTHYROIDISM, UNSPECIFIED TYPE: ICD-10-CM

## 2025-01-29 DIAGNOSIS — N18.31 STAGE 3A CHRONIC KIDNEY DISEASE: ICD-10-CM

## 2025-01-29 DIAGNOSIS — R73.03 PREDIABETES: ICD-10-CM

## 2025-01-29 NOTE — PROGRESS NOTES
Patient ID: [id number]     Chief Complaint: weightloss (1 month check up)    HPI:     Kayla Wall, a 74-year-old female, is here for a follow-up on her obesity treatment with Tirzepatide. She reports improvements in well-being and respiratory function and has been monitoring her diet. No other concerns are noted.    Past Medical History:   Diagnosis Date    Anxiety     Asthma     Basal cell carcinoma (BCC)     Bursitis     Carpal tunnel syndrome, bilateral upper limbs     Elevated diaphragm     Fatty liver     Genital herpes     GERD (gastroesophageal reflux disease)     Gout     H. pylori infection     HLD (hyperlipidemia)     HTN (hypertension)     Hypothyroidism     Impingement syndrome of right shoulder     ASHLEY (obstructive sleep apnea)     Osteoarthritis     Primary osteoarthritis of right shoulder     SOB (shortness of breath)         Past Surgical History:   Procedure Laterality Date    APPENDECTOMY  1964    BASAL CELL CARCINOMA EXCISION Left     nose - Dr Kenney Benitez    BASAL CELL CARCINOMA EXCISION Right 2023    nose - Dr Kenney Benitez    CARDIAC CATHETERIZATION  2021    Dr Yobani Serrano    CARPAL TUNNEL RELEASE Left 2022    Dr Epifanio Ivy    CARPAL TUNNEL RELEASE Right     Dr Petey Saenz     SECTION  1976     SECTION  1971     SECTION  1968    CHOLECYSTECTOMY  2016    Dr Darnell Reich    COLONOSCOPY N/A 8/3/2023    Procedure: COLONOSCOPY;  Surgeon: Low Jimenez MD;  Location: [facility];  Service: Gastroenterology;  Laterality: N/A;    DISSECTION OF NECK      HYSTERECTOMY      MYELOGRAPHY  10/22/1999    PLANTAR FASCIA RELEASE Left 2019    ROTATOR CUFF REPAIR Right 2017    Dr Kassy Hernadez    SHOULDER ARTHROSCOPY W/ SUBACROMIAL DECOMPRESSION AND DISTAL CLAVICLE EXCISION Right 2017    Dr Kassy Hernadez    TONSILLECTOMY  8        Social History     Socioeconomic History    Marital status:     Tobacco Use    Smoking status: Never    Smokeless tobacco: Never   Substance and Sexual Activity    Alcohol use: Not Currently    Drug use: Never    Sexual activity: Not Currently     Social Drivers of Health     Financial Resource Strain: Low Risk  (9/21/2023)    Overall Financial Resource Strain (CARDIA)     Difficulty of Paying Living Expenses: Not hard at all   Food Insecurity: No Food Insecurity (9/21/2023)    Hunger Vital Sign     Worried About Running Out of Food in the Last Year: Never true     Ran Out of Food in the Last Year: Never true   Transportation Needs: No Transportation Needs (9/21/2023)    PRAPARE - Transportation     Lack of Transportation (Medical): No     Lack of Transportation (Non-Medical): No   Physical Activity: Insufficiently Active (9/21/2023)    Exercise Vital Sign     Days of Exercise per Week: 6 days     Minutes of Exercise per Session: 20 min   Stress: Stress Concern Present (9/21/2023)    [employer] - Occupational Stress Questionnaire     Feeling of Stress : To some extent   Housing Stability: Low Risk  (9/21/2023)    Housing Stability Vital Sign     Unable to Pay for Housing in the Last Year: No     Number of Places Lived in the Last Year: 1     Unstable Housing in the Last Year: No        Current Outpatient Medications   Medication Instructions    albuterol (VENTOLIN HFA) 90 mcg/actuation inhaler 2 puffs, Inhalation, Every 6 hours PRN, Rescue    allopurinoL (ZYLOPRIM) 100 mg, Oral, 3 times daily    amLODIPine (NORVASC) 2.5 mg, Daily    bisoprolol (ZEBETA) 5 mg, Daily    blood sugar diagnostic Strp 1 each, Misc.(Non-Drug; Combo Route), Daily    blood-glucose meter kit Use as instructed    calcium carbonate (OS-YAHAIRA) 600 mg, Daily    cholecalciferol (vitamin D3) (VITAMIN D3) 2,000 Units, Daily    doxepin (SINEQUAN) 10 MG capsule TAKE 1 CAPSULE BY MOUTH EVERY DAY    DULoxetine (CYMBALTA) 60 mg, Daily    famotidine (PEPCID) 40 mg, Nightly    furosemide  (LASIX) 20 mg, Oral, Daily    ibuprofen (ADVIL,MOTRIN) 600 mg, Oral, Every 8 hours PRN    lancets (LANCETS,THIN) Misc 1 each, Misc.(Non-Drug; Combo Route), Daily    levothyroxine (SYNTHROID) 25 mcg, Oral    meclizine (ANTIVERT) 25 mg, Oral, 3 times daily PRN    montelukast (SINGULAIR) 10 mg tablet TAKE 1 TABLET BY MOUTH EVERY DAY IN THE EVENING    omeprazole (PRILOSEC) 40 mg, 2 times daily    rosuvastatin (CRESTOR) 20 mg, Oral    tirzepatide (weight loss) 5 mg, Subcutaneous, Weekly       Review of patient's allergies indicates:   Allergen Reactions    Adhesive tape-silicones      Other reaction(s): blisters skin    Adhesive Rash     Other reaction(s): blisters skin        Patient Care Team:  Anthony Leslie DO as PCP - General (Family Medicine)  Yobani Serrano MD as Consulting Physician (Cardiology)  Nedra Rushing MD (Dermatology)  Mona Pedraza FNP (Endocrinology)  Anali Braun FNP (Endocrinology)  Hola Barrios MD as Consulting Physician (Orthopedic Surgery)  Low Jimenez MD as Consulting Physician (Gastroenterology)  Kenney Benitez MD as Consulting Physician (Dermatology)  Nedra Castillo MD as Consulting Physician (Rheumatology)  Robert Mendez MD as Consulting Physician (Otolaryngology)     Subjective:     Review of Systems    12 point review of systems conducted, negative except as stated in the history of present illness. See HPI for details.    Objective:     Visit Vitals  /60 (BP Location: Right arm, Patient Position: Sitting)   Pulse 72   Temp 97.9 °F (36.6 °C)   Resp 20   Ht 5' (1.524 m)   Wt 77.8 kg (171 lb 9.6 oz)   SpO2 96%   BMI 33.51 kg/m²       Physical Exam  Vitals and nursing note reviewed.   Constitutional:       General: She is not in acute distress.     Appearance: She is obese. She is not ill-appearing.   HENT:      Head: Normocephalic and atraumatic.      Mouth/Throat:      Mouth: Mucous membranes are moist.      Pharynx: Oropharynx is clear.   Eyes:       General: No scleral icterus.     Extraocular Movements: Extraocular movements intact.      Conjunctiva/sclera: Conjunctivae normal.      Pupils: Pupils are equal, round, and reactive to light.   Neck:      Vascular: No carotid bruit.   Cardiovascular:      Rate and Rhythm: Normal rate and regular rhythm.      Heart sounds: No murmur heard.     No friction rub. No gallop.   Pulmonary:      Effort: Pulmonary effort is normal. No respiratory distress.      Breath sounds: Normal breath sounds. No wheezing, rhonchi or rales.   Abdominal:      General: Abdomen is flat. Bowel sounds are normal. There is no distension.      Palpations: Abdomen is soft. There is no mass.      Tenderness: There is no abdominal tenderness.   Musculoskeletal:         General: Normal range of motion.      Cervical back: Normal range of motion and neck supple.   Skin:     General: Skin is warm and dry.   Neurological:      General: No focal deficit present.      Mental Status: She is alert.   Psychiatric:         Mood and Affect: Mood normal.       Labs Reviewed:     Chemistry:  Lab Results   Component Value Date     (L) 12/18/2024    K 3.8 12/18/2024    BUN 11.0 12/18/2024    CREATININE 0.95 12/18/2024    EGFRNORACEVR >60 12/18/2024    GLUCOSE 127 (H) 12/18/2024    CALCIUM 10.1 12/18/2024    ALKPHOS 83 12/18/2024    LABPROT 7.4 12/18/2024    ALBUMIN 4.2 12/18/2024    BILIDIR 0.2 04/04/2024    IBILI 0.60 04/27/2022    AST 31 12/18/2024    ALT 40 12/18/2024    MG 2.00 06/27/2024    TSH 2.466 05/01/2024    VDCHWE2HJLS 0.93 05/01/2024        Lab Results   Component Value Date    HGBA1C 6.2 12/18/2024        Hematology:  Lab Results   Component Value Date    WBC 8.39 09/18/2024    HGB 14.4 09/18/2024    HCT 43.8 09/18/2024     09/18/2024       Lipid Panel:  Lab Results   Component Value Date    CHOL 154 12/18/2024    HDL 43 12/18/2024    LDL 69.00 12/18/2024    TRIG 210 (H) 12/18/2024    TOTALCHOLEST 4 12/18/2024        Urine:  Lab  Results   Component Value Date    APPEARANCEUA Clear 12/28/2022    SGUA <=1.005 12/28/2022    PROTEINUA Negative 12/28/2022    KETONESUA Negative 12/28/2022    LEUKOCYTESUR Negative 12/28/2022    RBCUA 3-5 12/28/2022    WBCUA 0-2 12/28/2022    BACTERIA Rare 12/28/2022     Assessment:       ICD-10-CM ICD-9-CM   1. BMI 33.0-33.9,adult  Z68.33 V85.33   2. Mixed hyperlipidemia  E78.2 272.2   3. Stage 3a chronic kidney disease  N18.31 585.3   4. Prediabetes  R73.03 790.29   5. Hypothyroidism, unspecified type  E03.9 244.9     Plan:     1. BMI 33.0-33.9,adult  Assessment & Plan:  The patient has lost 7 pounds since her last visit.   Increase Tirzepatide to 5 mg weekly x 3 months.   RTC to 3 months with labs before.     Body mass index is 33.51 kg/m².  Goal BMI <30.    Exercise as tolerated.   Avoid soda, simple sugars, excessive rice, potatoes or bread. Limit fast foods and fried foods.  Choose complex carbs in moderation (example: green vegetables, beans, oatmeal). Eat plenty of fresh fruits and vegetables with lean meats daily.  Do not skip meals. Eat a balanced portion size.    Orders:  -     tirzepatide, weight loss, 5 mg/0.5 mL Soln; Inject 0.5 mLs (5 mg total) into the skin once a week.  Dispense: 2 mL; Refill: 2  -     TSH; Future; Expected date: 04/14/2025  -     T4, Free; Future; Expected date: 04/14/2025  -     Hemoglobin A1C; Future; Expected date: 04/14/2025  -     Comprehensive Metabolic Panel; Future; Expected date: 04/14/2025  -     Lipid Panel; Future; Expected date: 04/14/2025    2. Mixed hyperlipidemia  -     Lipid Panel; Future; Expected date: 04/14/2025    3. Stage 3a chronic kidney disease  -     Comprehensive Metabolic Panel; Future; Expected date: 04/14/2025    4. Prediabetes  -     Hemoglobin A1C; Future; Expected date: 04/14/2025  -     Comprehensive Metabolic Panel; Future; Expected date: 04/14/2025    5. Hypothyroidism, unspecified type  -     TSH; Future; Expected date: 04/14/2025  -     T4,  Free; Future; Expected date: 04/14/2025      Follow up in about 3 months (around 4/29/2025) for Follow Up. In addition to their scheduled follow up, the patient has also been instructed to follow up on as needed basis.     Future Appointments   Date Time Provider Department Center   5/8/2025 10:40 AM Anthony Leslie DO [facility]   9/24/2025 10:00 AM Anthony Leslie DO [facility] Ka'Ryn Franklin, NP

## 2025-01-29 NOTE — ASSESSMENT & PLAN NOTE
The patient has lost 7 pounds since her last visit.   Increase Tirzepatide to 5 mg weekly x 3 months.   RTC to 3 months with labs before.     Body mass index is 33.51 kg/m².  Goal BMI <30.    Exercise as tolerated.   Avoid soda, simple sugars, excessive rice, potatoes or bread. Limit fast foods and fried foods.  Choose complex carbs in moderation (example: green vegetables, beans, oatmeal). Eat plenty of fresh fruits and vegetables with lean meats daily.  Do not skip meals. Eat a balanced portion size.

## 2025-02-26 DIAGNOSIS — F41.9 ANXIETY: ICD-10-CM

## 2025-02-26 DIAGNOSIS — E03.9 HYPOTHYROIDISM, UNSPECIFIED TYPE: ICD-10-CM

## 2025-02-26 RX ORDER — DOXEPIN HYDROCHLORIDE 10 MG/1
10 CAPSULE ORAL
Qty: 90 CAPSULE | Refills: 1 | Status: SHIPPED | OUTPATIENT
Start: 2025-02-26

## 2025-02-26 RX ORDER — LEVOTHYROXINE SODIUM 25 UG/1
25 TABLET ORAL
Qty: 90 TABLET | Refills: 1 | Status: SHIPPED | OUTPATIENT
Start: 2025-02-26

## 2025-03-10 ENCOUNTER — HOSPITAL ENCOUNTER (OUTPATIENT)
Dept: RADIOLOGY | Facility: HOSPITAL | Age: 75
Discharge: HOME | End: 2025-03-10
Attending: PSYCHIATRY & NEUROLOGY
Payer: MEDICARE

## 2025-03-10 DIAGNOSIS — G31.84 MCI (MILD COGNITIVE IMPAIRMENT) WITH MEMORY LOSS: ICD-10-CM

## 2025-03-10 PROCEDURE — 70551 MRI BRAIN STEM W/O DYE: CPT | Mod: TC

## 2025-04-07 RX ORDER — TIRZEPATIDE 5 MG/.5ML
INJECTION, SOLUTION SUBCUTANEOUS
Qty: 2 ML | Refills: 2 | Status: SHIPPED | OUTPATIENT
Start: 2025-04-07

## 2025-04-10 ENCOUNTER — HOSPITAL ENCOUNTER (OUTPATIENT)
Dept: RADIOLOGY | Facility: HOSPITAL | Age: 75
Discharge: HOME | End: 2025-04-10
Attending: OBSTETRICS & GYNECOLOGY
Payer: MEDICARE

## 2025-04-10 DIAGNOSIS — Z12.31 VISIT FOR SCREENING MAMMOGRAM: ICD-10-CM

## 2025-04-10 PROCEDURE — 77063 BREAST TOMOSYNTHESIS BI: CPT | Mod: 26,,, | Performed by: RADIOLOGY

## 2025-04-10 PROCEDURE — 77063 BREAST TOMOSYNTHESIS BI: CPT | Mod: TC

## 2025-04-10 PROCEDURE — 77067 SCR MAMMO BI INCL CAD: CPT | Mod: 26,,, | Performed by: RADIOLOGY

## 2025-04-14 ENCOUNTER — TELEPHONE (OUTPATIENT)
Dept: FAMILY MEDICINE | Facility: CLINIC | Age: 75
End: 2025-04-14
Payer: MEDICARE

## 2025-04-14 NOTE — TELEPHONE ENCOUNTER
Pt called in regards to her Zepbound injections whether to increase or stay at .5 it was advised by NP Moo for her to stay at .5 considering her age

## 2025-05-05 ENCOUNTER — LAB VISIT (OUTPATIENT)
Dept: LAB | Facility: HOSPITAL | Age: 75
End: 2025-05-05
Payer: MEDICARE

## 2025-05-05 DIAGNOSIS — N18.31 STAGE 3A CHRONIC KIDNEY DISEASE: ICD-10-CM

## 2025-05-05 DIAGNOSIS — E03.9 HYPOTHYROIDISM, UNSPECIFIED TYPE: ICD-10-CM

## 2025-05-05 DIAGNOSIS — E78.2 MIXED HYPERLIPIDEMIA: Chronic | ICD-10-CM

## 2025-05-05 DIAGNOSIS — R73.03 PREDIABETES: ICD-10-CM

## 2025-05-05 LAB
ALBUMIN SERPL-MCNC: 4.1 G/DL (ref 3.4–4.8)
ALBUMIN/GLOB SERPL: 1.1 RATIO (ref 1.1–2)
ALP SERPL-CCNC: 82 UNIT/L (ref 40–150)
ALT SERPL-CCNC: 32 UNIT/L (ref 0–55)
ANION GAP SERPL CALC-SCNC: 7 MEQ/L
AST SERPL-CCNC: 26 UNIT/L (ref 11–45)
BILIRUB SERPL-MCNC: 0.6 MG/DL
BUN SERPL-MCNC: 14 MG/DL (ref 9.8–20.1)
CALCIUM SERPL-MCNC: 10 MG/DL (ref 8.4–10.2)
CHLORIDE SERPL-SCNC: 106 MMOL/L (ref 98–107)
CHOLEST SERPL-MCNC: 179 MG/DL
CHOLEST/HDLC SERPL: 4 {RATIO} (ref 0–5)
CO2 SERPL-SCNC: 29 MMOL/L (ref 23–31)
CREAT SERPL-MCNC: 0.81 MG/DL (ref 0.55–1.02)
CREAT/UREA NIT SERPL: 17
EST. AVERAGE GLUCOSE BLD GHB EST-MCNC: 116.9 MG/DL
GFR SERPLBLD CREATININE-BSD FMLA CKD-EPI: >60 ML/MIN/1.73/M2
GLOBULIN SER-MCNC: 3.7 GM/DL (ref 2.4–3.5)
GLUCOSE SERPL-MCNC: 100 MG/DL (ref 82–115)
HBA1C MFR BLD: 5.7 %
HDLC SERPL-MCNC: 40 MG/DL (ref 35–60)
LDLC SERPL CALC-MCNC: 98 MG/DL (ref 50–140)
POTASSIUM SERPL-SCNC: 4.6 MMOL/L (ref 3.5–5.1)
PROT SERPL-MCNC: 7.8 GM/DL (ref 5.8–7.6)
SODIUM SERPL-SCNC: 142 MMOL/L (ref 136–145)
T4 FREE SERPL-MCNC: 0.95 NG/DL (ref 0.7–1.48)
TRIGL SERPL-MCNC: 203 MG/DL (ref 37–140)
TSH SERPL-ACNC: 2.74 UIU/ML (ref 0.35–4.94)
VLDLC SERPL CALC-MCNC: 41 MG/DL

## 2025-05-05 PROCEDURE — 80061 LIPID PANEL: CPT

## 2025-05-05 PROCEDURE — 84439 ASSAY OF FREE THYROXINE: CPT

## 2025-05-05 PROCEDURE — 83036 HEMOGLOBIN GLYCOSYLATED A1C: CPT

## 2025-05-05 PROCEDURE — 84443 ASSAY THYROID STIM HORMONE: CPT

## 2025-05-05 PROCEDURE — 80053 COMPREHEN METABOLIC PANEL: CPT

## 2025-05-05 PROCEDURE — 36415 COLL VENOUS BLD VENIPUNCTURE: CPT

## 2025-05-06 ENCOUNTER — RESULTS FOLLOW-UP (OUTPATIENT)
Dept: FAMILY MEDICINE | Facility: CLINIC | Age: 75
End: 2025-05-06

## 2025-05-28 ENCOUNTER — OFFICE VISIT (OUTPATIENT)
Dept: FAMILY MEDICINE | Facility: CLINIC | Age: 75
End: 2025-05-28
Payer: MEDICARE

## 2025-05-28 VITALS
HEIGHT: 60 IN | DIASTOLIC BLOOD PRESSURE: 65 MMHG | RESPIRATION RATE: 16 BRPM | HEART RATE: 78 BPM | BODY MASS INDEX: 30.82 KG/M2 | WEIGHT: 157 LBS | SYSTOLIC BLOOD PRESSURE: 121 MMHG | OXYGEN SATURATION: 98 %

## 2025-05-28 DIAGNOSIS — E66.09 CLASS 1 OBESITY DUE TO EXCESS CALORIES WITH SERIOUS COMORBIDITY AND BODY MASS INDEX (BMI) OF 30.0 TO 30.9 IN ADULT: ICD-10-CM

## 2025-05-28 DIAGNOSIS — R73.03 PREDIABETES: ICD-10-CM

## 2025-05-28 DIAGNOSIS — I10 PRIMARY HYPERTENSION: Primary | ICD-10-CM

## 2025-05-28 DIAGNOSIS — E66.811 CLASS 1 OBESITY DUE TO EXCESS CALORIES WITH SERIOUS COMORBIDITY AND BODY MASS INDEX (BMI) OF 30.0 TO 30.9 IN ADULT: ICD-10-CM

## 2025-05-28 PROBLEM — M85.80 OSTEOPENIA: Status: ACTIVE | Noted: 2025-05-28

## 2025-05-28 PROCEDURE — 99214 OFFICE O/P EST MOD 30 MIN: CPT | Mod: ,,, | Performed by: FAMILY MEDICINE

## 2025-05-28 NOTE — PROGRESS NOTES
Patient ID: [id number]     Chief Complaint: Hypertension (Patient is here for a hypertension follow up.)        HPI:     Kayla Wall is a 74 y.o. female here today for Hypertension (Patient is here for a hypertension follow up.) No other complaints today.     History of Present Illness               -------------------------------------    Anxiety    Asthma    Basal cell carcinoma (BCC)    Bursitis    Carpal tunnel syndrome, bilateral upper limbs    Elevated diaphragm    Fatty liver    Genital herpes    GERD (gastroesophageal reflux disease)    Gout    H. pylori infection    HLD (hyperlipidemia)    HTN (hypertension)    Hypothyroidism    Impingement syndrome of right shoulder    ASHLEY (obstructive sleep apnea)    Osteoarthritis    Primary osteoarthritis of right shoulder    SOB (shortness of breath)        Past Surgical History:   Procedure Laterality Date    APPENDECTOMY  1964    BASAL CELL CARCINOMA EXCISION Left     nose - Dr Kenney Benitez    BASAL CELL CARCINOMA EXCISION Right 2023    nose - Dr Kenney Benitez    CARDIAC CATHETERIZATION  2021    Dr Yobani Serrano    CARPAL TUNNEL RELEASE Left 2022    Dr Epifanio Ivy    CARPAL TUNNEL RELEASE Right     Dr Petey Saenz     SECTION  1976     SECTION  1971     SECTION  1968    CHOLECYSTECTOMY  2016    Dr Darnell Reich    COLONOSCOPY N/A 8/3/2023    Procedure: COLONOSCOPY;  Surgeon: Low Jimenez MD;  Location: [facility];  Service: Gastroenterology;  Laterality: N/A;    DISSECTION OF NECK      HYSTERECTOMY      MYELOGRAPHY  10/22/1999    PLANTAR FASCIA RELEASE Left 2019    ROTATOR CUFF REPAIR Right 2017    Dr Kassy Hernadez    SHOULDER ARTHROSCOPY W/ SUBACROMIAL DECOMPRESSION AND DISTAL CLAVICLE EXCISION Right 2017    Dr Kassy Hernadez    TONSILLECTOMY         Review of patient's allergies indicates:   Allergen Reactions    Adhesive tape-silicones      Other  reaction(s): blisters skin    Adhesive Rash     Other reaction(s): blisters skin       Outpatient Medications Marked as Taking for the 5/28/25 encounter (Office Visit) with Anthony Leslie,    Medication Sig Dispense Refill    allopurinoL (ZYLOPRIM) 100 MG tablet TAKE 1 TABLET BY MOUTH THREE TIMES A  tablet 1    amLODIPine (NORVASC) 2.5 MG tablet Take 2.5 mg by mouth once daily.      blood sugar diagnostic Strp 1 each by Misc.(Non-Drug; Combo Route) route once daily. 30 each 2    blood-glucose meter kit Use as instructed 1 each 0    calcium carbonate (OS-YAHAIRA) 600 mg calcium (1,500 mg) Tab Take 600 mg by mouth once daily.      cholecalciferol, vitamin D3, (VITAMIN D3) 50 mcg (2,000 unit) Cap capsule Take 2,000 Units by mouth once daily.      doxepin (SINEQUAN) 10 MG capsule TAKE 1 CAPSULE BY MOUTH EVERY DAY 90 capsule 1    DULoxetine (CYMBALTA) 60 MG capsule Take 60 mg by mouth once daily.      famotidine (PEPCID) 40 MG tablet Take 40 mg by mouth every evening.      furosemide (LASIX) 20 MG tablet Take 1 tablet (20 mg total) by mouth once daily. 30 tablet 2    lancets (LANCETS,THIN) Misc 1 each by Misc.(Non-Drug; Combo Route) route once daily. 30 each 2    levothyroxine (SYNTHROID) 25 MCG tablet TAKE 1 TABLET BY MOUTH EVERY DAY 90 tablet 1    meclizine (ANTIVERT) 25 mg tablet Take 1 tablet (25 mg total) by mouth 3 (three) times daily as needed for Dizziness. 90 tablet 2    montelukast (SINGULAIR) 10 mg tablet TAKE 1 TABLET BY MOUTH EVERY DAY IN THE EVENING 90 tablet 3    ZEPBOUND 5 mg/0.5 mL Soln INJECT 0.5 ML (5 MG) UNDER THE SKIN ONCE WEEKLY (0.5ML= 50 UNITS) 2 mL 2       Social History[1]     Family History   Problem Relation Name Age of Onset    Heart disease Mother      Heart failure Mother      Hypertension Mother      Lung cancer Father  49        Metastatic    No Known Problems Sister      Diabetes Mellitus Maternal Grandmother      Asthma Daughter      Thyroid disease Daughter      Hypertension  Son          Patient Care Team:  Anthony Leslie DO as PCP - General (Family Medicine)  Yobani Serrano MD as Consulting Physician (Cardiology)  Nedra Rushing MD (Dermatology)  Mona Pedraza FNP (Endocrinology)  Anali Braun FNP (Endocrinology)  Hola Barrios MD as Consulting Physician (Orthopedic Surgery)  Low Jimenez MD as Consulting Physician (Gastroenterology)  Kenney Benitez MD as Consulting Physician (Dermatology)  Nedra Castillo MD as Consulting Physician (Rheumatology)  Robert Mendez MD as Consulting Physician (Otolaryngology)     Subjective:     ROS    See HPI for details  All Other ROS: Negative except as stated in HPI.       Objective:     /65 (BP Location: Left arm, Patient Position: Sitting)   Pulse 78   Resp 16   Ht 5' (1.524 m)   Wt 71.2 kg (157 lb)   SpO2 98%   BMI 30.66 kg/m²     Physical Exam  Vitals reviewed.   Constitutional:       General: She is not in acute distress.     Appearance: Normal appearance. She is obese.   Cardiovascular:      Rate and Rhythm: Normal rate and regular rhythm.      Heart sounds: No murmur heard.     No friction rub. No gallop.   Pulmonary:      Effort: No respiratory distress.      Breath sounds: No wheezing, rhonchi or rales.   Musculoskeletal:         General: No swelling, tenderness or deformity.      Right lower leg: No edema.      Left lower leg: No edema.   Skin:     General: Skin is warm and dry.      Findings: No lesion or rash.   Neurological:      General: No focal deficit present.      Mental Status: She is alert.   Psychiatric:         Mood and Affect: Mood normal.         Assessment/Plan:     1. Primary hypertension  Well controlled on amlodipine 2.5mg daily, bisoprolol 2.5mg daily.  2. Prediabetes  -continue zepbound 5mg weekly     3. Class 1 obesity due to excess calories with serious comorbidity and body mass index (BMI) of 30.0 to 30.9 in adult  Continue diet and lifestyle changes  Continue zepbound  5mg weekly        Assessment & Plan               This note was generated with the assistance of ambient listening technology. Verbal consent was obtained by the patient and accompanying visitor(s) for the recording of patient appointment to facilitate this note. I attest to having reviewed and edited the generated note for accuracy, though some syntax or spelling errors may persist. Please contact the author of this note for any clarification.     Follow up:     Follow up in about 6 months (around 11/28/2025) for Medicare Wellness. In addition to their scheduled follow up, the patient has also been instructed to follow up on as needed basis.          [1]   Social History  Socioeconomic History    Marital status:    Tobacco Use    Smoking status: Never    Smokeless tobacco: Never   Substance and Sexual Activity    Alcohol use: Not Currently    Drug use: Never    Sexual activity: Not Currently     Social Drivers of Health     Financial Resource Strain: Low Risk  (5/28/2025)    Overall Financial Resource Strain (CARDIA)     Difficulty of Paying Living Expenses: Not hard at all   Food Insecurity: No Food Insecurity (5/28/2025)    Hunger Vital Sign     Worried About Running Out of Food in the Last Year: Never true     Ran Out of Food in the Last Year: Never true   Transportation Needs: No Transportation Needs (5/28/2025)    PRAPARE - Transportation     Lack of Transportation (Medical): No     Lack of Transportation (Non-Medical): No   Physical Activity: Sufficiently Active (5/28/2025)    Exercise Vital Sign     Days of Exercise per Week: 5 days     Minutes of Exercise per Session: 50 min   Stress: No Stress Concern Present (5/28/2025)    [employer] [place] [employer] - Occupational Stress Questionnaire     Feeling of Stress : Not at all   Housing Stability: Low Risk  (5/28/2025)    Housing Stability Vital Sign     Unable to Pay for Housing in the Last Year: No     Number of Times Moved in the Last  Year: 0     Homeless in the Last Year: No

## 2025-06-26 RX ORDER — TIRZEPATIDE 5 MG/.5ML
INJECTION, SOLUTION SUBCUTANEOUS
Qty: 2 ML | Refills: 2 | Status: SHIPPED | OUTPATIENT
Start: 2025-06-26

## 2025-07-29 ENCOUNTER — OFFICE VISIT (OUTPATIENT)
Dept: FAMILY MEDICINE | Facility: CLINIC | Age: 75
End: 2025-07-29
Payer: MEDICARE

## 2025-07-29 VITALS
DIASTOLIC BLOOD PRESSURE: 73 MMHG | HEART RATE: 72 BPM | HEIGHT: 60 IN | TEMPERATURE: 98 F | SYSTOLIC BLOOD PRESSURE: 108 MMHG | RESPIRATION RATE: 20 BRPM | WEIGHT: 154.19 LBS | OXYGEN SATURATION: 97 % | BODY MASS INDEX: 30.27 KG/M2

## 2025-07-29 DIAGNOSIS — J45.20 MILD INTERMITTENT ASTHMA WITHOUT COMPLICATION: Chronic | ICD-10-CM

## 2025-07-29 DIAGNOSIS — J01.90 SUBACUTE SINUSITIS, UNSPECIFIED LOCATION: ICD-10-CM

## 2025-07-29 DIAGNOSIS — M94.0 COSTOCHONDRITIS, ACUTE: Primary | ICD-10-CM

## 2025-07-29 PROCEDURE — 99214 OFFICE O/P EST MOD 30 MIN: CPT | Mod: ,,, | Performed by: FAMILY MEDICINE

## 2025-07-29 RX ORDER — MONTELUKAST SODIUM 10 MG/1
10 TABLET ORAL NIGHTLY
Qty: 90 TABLET | Refills: 3 | Status: SHIPPED | OUTPATIENT
Start: 2025-07-29

## 2025-08-06 DIAGNOSIS — M1A.0790 IDIOPATHIC CHRONIC GOUT OF FOOT WITHOUT TOPHUS, UNSPECIFIED LATERALITY: Chronic | ICD-10-CM

## 2025-08-06 RX ORDER — ALLOPURINOL 100 MG/1
100 TABLET ORAL 3 TIMES DAILY
Qty: 270 TABLET | Refills: 1 | Status: SHIPPED | OUTPATIENT
Start: 2025-08-06

## 2025-08-19 DIAGNOSIS — E78.2 MIXED HYPERLIPIDEMIA: Chronic | ICD-10-CM

## 2025-08-19 DIAGNOSIS — Z11.59 NEED FOR HEPATITIS C SCREENING TEST: ICD-10-CM

## 2025-08-19 DIAGNOSIS — R73.03 PREDIABETES: ICD-10-CM

## 2025-08-19 DIAGNOSIS — I10 PRIMARY HYPERTENSION: ICD-10-CM

## 2025-08-19 DIAGNOSIS — Z11.4 SCREENING FOR HIV (HUMAN IMMUNODEFICIENCY VIRUS): ICD-10-CM

## 2025-08-19 DIAGNOSIS — E03.9 HYPOTHYROIDISM, UNSPECIFIED TYPE: ICD-10-CM

## 2025-08-19 DIAGNOSIS — Z00.00 WELLNESS EXAMINATION: Primary | ICD-10-CM

## (undated) DEVICE — LINER SUCTION CANISTER (DISP)

## (undated) DEVICE — TUBE SUCTION MEDI-VAC STERILE

## (undated) DEVICE — ADAPTER AIR/WATER CHANNEL CLEA

## (undated) DEVICE — SOL IRRI STRL WATER 1000ML

## (undated) DEVICE — KIT SURGICAL COLON .25 1.1OZ